# Patient Record
Sex: MALE | Race: WHITE | Employment: OTHER | URBAN - METROPOLITAN AREA
[De-identification: names, ages, dates, MRNs, and addresses within clinical notes are randomized per-mention and may not be internally consistent; named-entity substitution may affect disease eponyms.]

---

## 2017-10-30 RX ORDER — PRAVASTATIN SODIUM 40 MG
40 TABLET ORAL DAILY
Qty: 30 TABLET | Refills: 0 | Status: SHIPPED | OUTPATIENT
Start: 2017-10-30 | End: 2017-11-02 | Stop reason: SDUPTHER

## 2017-10-30 NOTE — TELEPHONE ENCOUNTER
From: Tosin Dickson  Sent: 10/28/2017 12:09 PM EDT  Subject: Medication Renewal Request    Tosin Haider.  Yessi would like a refill of the following medications:  pravastatin (PRAVACHOL) 40 MG tablet Trudi Calvillo MD]    Preferred pharmacy: [address] [phone] - F [phone]    Comment:

## 2017-11-02 ENCOUNTER — OFFICE VISIT (OUTPATIENT)
Dept: FAMILY MEDICINE CLINIC | Age: 62
End: 2017-11-02

## 2017-11-02 VITALS
HEART RATE: 62 BPM | DIASTOLIC BLOOD PRESSURE: 78 MMHG | WEIGHT: 161 LBS | SYSTOLIC BLOOD PRESSURE: 100 MMHG | HEIGHT: 69 IN | BODY MASS INDEX: 23.85 KG/M2

## 2017-11-02 DIAGNOSIS — Z00.00 ANNUAL PHYSICAL EXAM: Primary | ICD-10-CM

## 2017-11-02 DIAGNOSIS — Z00.00 ANNUAL PHYSICAL EXAM: ICD-10-CM

## 2017-11-02 DIAGNOSIS — R42 VERTIGO: ICD-10-CM

## 2017-11-02 LAB
HCT VFR BLD CALC: 45.6 % (ref 40.5–52.5)
HEMOGLOBIN: 15.3 G/DL (ref 13.5–17.5)
MCH RBC QN AUTO: 31.5 PG (ref 26–34)
MCHC RBC AUTO-ENTMCNC: 33.6 G/DL (ref 31–36)
MCV RBC AUTO: 93.8 FL (ref 80–100)
PDW BLD-RTO: 13.1 % (ref 12.4–15.4)
PLATELET # BLD: 192 K/UL (ref 135–450)
PMV BLD AUTO: 9.6 FL (ref 5–10.5)
RBC # BLD: 4.86 M/UL (ref 4.2–5.9)
WBC # BLD: 5.8 K/UL (ref 4–11)

## 2017-11-02 PROCEDURE — 90732 PPSV23 VACC 2 YRS+ SUBQ/IM: CPT | Performed by: FAMILY MEDICINE

## 2017-11-02 PROCEDURE — 90472 IMMUNIZATION ADMIN EACH ADD: CPT | Performed by: FAMILY MEDICINE

## 2017-11-02 PROCEDURE — 90471 IMMUNIZATION ADMIN: CPT | Performed by: FAMILY MEDICINE

## 2017-11-02 PROCEDURE — 99396 PREV VISIT EST AGE 40-64: CPT | Performed by: FAMILY MEDICINE

## 2017-11-02 PROCEDURE — 90630 INFLUENZA, QUADV, 18-64 YRS, ID, PF, MICRO INJ, 0.1ML (FLUZONE QUADV, PF): CPT | Performed by: FAMILY MEDICINE

## 2017-11-02 RX ORDER — MECLIZINE HCL 12.5 MG/1
12.5 TABLET ORAL 3 TIMES DAILY PRN
Qty: 30 TABLET | Refills: 1 | Status: SHIPPED | OUTPATIENT
Start: 2017-11-02 | End: 2017-11-12

## 2017-11-02 RX ORDER — PRAVASTATIN SODIUM 40 MG
40 TABLET ORAL DAILY
Qty: 90 TABLET | Refills: 1 | Status: SHIPPED | OUTPATIENT
Start: 2017-11-02 | End: 2017-12-11 | Stop reason: SDUPTHER

## 2017-11-02 NOTE — PROGRESS NOTES
Vaccine Information Sheet, \"Influenza - Inactivated\"  given to Loretta Dickson, or parent/legal guardian of  Loretta Dickson and verbalized understanding. Patient responses:    Have you ever had a reaction to a flu vaccine? No  Are you able to eat eggs without adverse effects? Yes  Do you have any current illness? No  Have you ever had [place] Syndrome? Yes and No    Flu vaccine given per order. Please see immunization tab.

## 2017-11-02 NOTE — PATIENT INSTRUCTIONS
Patient Education        Vertigo: Exercises  Your Care Instructions  Here are some examples of typical rehabilitation exercises for your condition. Start each exercise slowly. Ease off the exercise if you start to have pain. Your doctor or physical therapist will tell you when you can start these exercises and which ones will work best for you. How to do the exercises  Note: Do these exercises twice a day. Try to progress to doing each head movement 15 to 20 times. Then try to do them with your eyes closed. Exercise 1    1. Stand with a chair in front of you and a wall behind you. If you begin to fall, you may use them for support. 2. Stand with your feet together and your arms at your sides. 3. Move your head up and down 10 times. Exercise 2    Move your head side to side 10 times. Exercise 3    Move your head diagonally up and down 10 times. Exercise 4    Move your head diagonally up and down 10 times on the other side. Follow-up care is a key part of your treatment and safety. Be sure to make and go to all appointments, and call your doctor if you are having problems. It's also a good idea to know your test results and keep a list of the medicines you take. Where can you learn more? Go to https://TradingViewpeConyac.Max-Viz. org and sign in to your Statusly account. Enter F349 in the Outski box to learn more about \"Vertigo: Exercises. \"     If you do not have an account, please click on the \"Sign Up Now\" link. Current as of: July 29, 2016  Content Version: 11.3  © 4386-2679 [employer]. Care instructions adapted under license by [facility] apprupt [facility] ([facility]). If you have questions about a medical condition or this instruction, always ask your healthcare professional. [place] [address] any warranty or liability for your use of this information.        Patient Education        Cawthorne Exercises for Vertigo: Care Instructions  Your Care Instructions  Simple exercises can in a [place] a different way each time you stand. · With someone close by to help you, try the above exercises with your eyes closed. Exercise 5  In a room that is cleared of obstacles:  · Walk to a corner of the room, turn to your right, and walk back to the starting point. Now, repeat and turn left. · Walk up and down a slope. Now try stairs. · While holding on to someone's arm, try these exercises with your eyes closed. When should you call for help? Watch closely for changes in your health, and be sure to contact your doctor if:  · You do not get better as expected. Where can you learn more? Go to https://Sensible Medical Innovationspepiceweb.SpanDeX. org and sign in to your Sodbuster account. Enter P521 in the Quewey box to learn more about \"Cawthorne Exercises for Vertigo: Care Instructions. \"     If you do not have an account, please click on the \"Sign Up Now\" link. Current as of: October 19, 2016  Content Version: 11.3  © 1302-0729 C[employer]dice [employer]. Care instructions adapted under license by [facility] ([facility]). If you have questions about a medical condition or this instruction, always ask your healthcare professional. [place] [address] any warranty or liability for your use of this information.

## 2017-11-03 LAB
ALBUMIN SERPL-MCNC: 4.4 G/DL (ref 3.4–5)
ALP BLD-CCNC: 83 U/L (ref 40–129)
ALT SERPL-CCNC: 30 U/L (ref 10–40)
ANION GAP SERPL CALCULATED.3IONS-SCNC: 12 MMOL/L (ref 3–16)
AST SERPL-CCNC: 23 U/L (ref 15–37)
BILIRUB SERPL-MCNC: 0.3 MG/DL (ref 0–1)
BILIRUBIN DIRECT: <0.2 MG/DL (ref 0–0.3)
BILIRUBIN, INDIRECT: NORMAL MG/DL (ref 0–1)
BUN BLDV-MCNC: 19 MG/DL (ref 7–20)
CALCIUM SERPL-MCNC: 9.5 MG/DL (ref 8.3–10.6)
CHLORIDE BLD-SCNC: 103 MMOL/L (ref 99–110)
CHOLESTEROL, TOTAL: 220 MG/DL (ref 0–199)
CO2: 27 MMOL/L (ref 21–32)
CREAT SERPL-MCNC: 0.8 MG/DL (ref 0.8–1.3)
GFR AFRICAN AMERICAN: >60
GFR NON-AFRICAN AMERICAN: >60
GLUCOSE BLD-MCNC: 98 MG/DL (ref 70–99)
HDLC SERPL-MCNC: 71 MG/DL (ref 40–60)
LDL CHOLESTEROL CALCULATED: 128 MG/DL
POTASSIUM SERPL-SCNC: 5.4 MMOL/L (ref 3.5–5.1)
SODIUM BLD-SCNC: 142 MMOL/L (ref 136–145)
TOTAL CK: 113 U/L (ref 39–308)
TOTAL PROTEIN: 7.3 G/DL (ref 6.4–8.2)
TRIGL SERPL-MCNC: 105 MG/DL (ref 0–150)
TSH SERPL DL<=0.05 MIU/L-ACNC: 2.29 UIU/ML (ref 0.27–4.2)
VLDLC SERPL CALC-MCNC: 21 MG/DL

## 2017-11-03 ASSESSMENT — ENCOUNTER SYMPTOMS
ALLERGIC/IMMUNOLOGIC NEGATIVE: 1
EYES NEGATIVE: 1
RESPIRATORY NEGATIVE: 1

## 2017-11-03 NOTE — PROGRESS NOTES
SUBJECTIVE:  Patient ID: Ricci Dickson is a 65 y.o. y.o. male     HPI   Pt is here for annual physical exam, over all doing well no complained   Doing well on Statin   Has occasional vertigo, had full work up in the past seen by ENT was given Ativan, then Levora Win switch him to Atarax, doesn't like the way made him feel so he quit taking it, he wants something different to use PRN   Past Medical History:   Diagnosis Date    COPD     mild- no inhalers    Elevated liver function tests     Giardiasis     Hyperlipidemia     Kidney stone       Past Surgical History:   Procedure Laterality Date    CARDIOVASCULAR STRESS TEST  feb 2008    negative    CARDIOVASCULAR STRESS TEST  Jan 2010    Franciscoaline Silvan COLONOSCOPY  7/2004    Dr. Christian Joyce  4/2008    Dr. Christian Joyce  2013    OTHER SURGICAL HISTORY Left 04/20/2015    LEFT KNEE OPEN PERONEAL NERVE DECOMPRESSION    SKIN BIOPSY  Jan 2011    telangiectasia, benign     Family History   Problem Relation Age of Onset    Cancer Father      Prostate ca in 63's    Heart Attack Brother      At age 52   Shebaa March Coronary Art Dis Brother      Social History     Social History    Marital status:      Spouse name: N/A    Number of children: N/A    Years of education: N/A     Occupational History          Social History Main Topics    Smoking status: Current Every Day Smoker     Packs/day: 0.20    Smokeless tobacco: None    Alcohol use 7.2 oz/week     12 Cans of beer per week    Drug use: No    Sexual activity: Not Asked     Other Topics Concern    None     Social History Narrative    None     Current Outpatient Prescriptions   Medication Sig Dispense Refill    pravastatin (PRAVACHOL) 40 MG tablet Take 1 tablet by mouth daily 90 tablet 1    meclizine (ANTIVERT) 12.5 MG tablet Take 1 tablet by mouth 3 times daily as needed for Dizziness 30 tablet 1    hydrOXYzine (ATARAX) 25 MG tablet TAKE 1 TABLET DAILY 90 tablet 1    fish distension and no mass. There is no tenderness. There is no rebound and no guarding. Musculoskeletal: Normal range of motion. He exhibits no edema or tenderness. Lymphadenopathy:     He has no cervical adenopathy. Neurological: He is alert and oriented to person, place, and time. He has normal reflexes. No cranial nerve deficit. He exhibits normal muscle tone. Coordination normal.   Skin: Skin is warm and dry. No rash noted. He is not diaphoretic. No erythema. No pallor. Psychiatric: He has a normal mood and affect. His behavior is normal. Judgment and thought content normal.       ASSESSMENT:    1. Annual physical exam    2.  Vertigo        PLAN:  Continue the same   Trial of Antivert   Discussed [facility]   Will come back for Zostavax

## 2017-11-16 ENCOUNTER — NURSE ONLY (OUTPATIENT)
Dept: FAMILY MEDICINE CLINIC | Age: 62
End: 2017-11-16

## 2017-11-16 DIAGNOSIS — Z23 NEED FOR ZOSTER VACCINATION: Primary | ICD-10-CM

## 2017-11-16 PROCEDURE — 90471 IMMUNIZATION ADMIN: CPT | Performed by: FAMILY MEDICINE

## 2017-11-16 PROCEDURE — 90736 HZV VACCINE LIVE SUBQ: CPT | Performed by: FAMILY MEDICINE

## 2017-12-11 DIAGNOSIS — Z00.00 ANNUAL PHYSICAL EXAM: ICD-10-CM

## 2017-12-11 RX ORDER — PRAVASTATIN SODIUM 40 MG
40 TABLET ORAL DAILY
Qty: 90 TABLET | Refills: 1 | Status: SHIPPED | OUTPATIENT
Start: 2017-12-11 | End: 2018-04-16 | Stop reason: SDUPTHER

## 2017-12-11 NOTE — TELEPHONE ENCOUNTER
From: Juan M Dickson  Sent: 12/10/2017 4:45 PM EST  Subject: Medication Renewal Request    Juan M Ramirez.  Yessi would like a refill of the following medications:  pravastatin (PRAVACHOL) 40 MG tablet Anton Coulter MD]    Preferred pharmacy: [address] [place] [address] [phone] - F [phone]    Comment:

## 2017-12-11 NOTE — TELEPHONE ENCOUNTER
From: Natalio Dickson  Sent: 12/10/2017 4:44 PM EST  Subject: Medication Renewal Request    Natalio Roman. Yessi would like a refill of the following medications:  pravastatin (PRAVACHOL) 40 MG tablet Sonia Jeffries MD]    Preferred pharmacy: [address] [place] [address] - F [phone]    Comment:  My last prescription was at [facility] for 30 days. Please send a prescription to [facility] for 90 day supply.  Thanks

## 2018-02-22 ENCOUNTER — TELEPHONE (OUTPATIENT)
Dept: ORTHOPEDIC SURGERY | Age: 63
End: 2018-02-22

## 2018-03-06 ENCOUNTER — OFFICE VISIT (OUTPATIENT)
Dept: ORTHOPEDIC SURGERY | Age: 63
End: 2018-03-06

## 2018-03-06 VITALS
HEART RATE: 61 BPM | WEIGHT: 160 LBS | HEIGHT: 69 IN | BODY MASS INDEX: 23.7 KG/M2 | SYSTOLIC BLOOD PRESSURE: 113 MMHG | DIASTOLIC BLOOD PRESSURE: 75 MMHG

## 2018-03-06 DIAGNOSIS — M25.561 RIGHT KNEE PAIN, UNSPECIFIED CHRONICITY: Primary | ICD-10-CM

## 2018-03-06 DIAGNOSIS — M17.0 PRIMARY OSTEOARTHRITIS OF BOTH KNEES: ICD-10-CM

## 2018-03-06 DIAGNOSIS — M25.562 LEFT KNEE PAIN, UNSPECIFIED CHRONICITY: ICD-10-CM

## 2018-03-06 PROCEDURE — G8420 CALC BMI NORM PARAMETERS: HCPCS | Performed by: ORTHOPAEDIC SURGERY

## 2018-03-06 PROCEDURE — G8427 DOCREV CUR MEDS BY ELIG CLIN: HCPCS | Performed by: ORTHOPAEDIC SURGERY

## 2018-03-06 PROCEDURE — 99213 OFFICE O/P EST LOW 20 MIN: CPT | Performed by: ORTHOPAEDIC SURGERY

## 2018-03-06 PROCEDURE — 3017F COLORECTAL CA SCREEN DOC REV: CPT | Performed by: ORTHOPAEDIC SURGERY

## 2018-03-06 PROCEDURE — G8484 FLU IMMUNIZE NO ADMIN: HCPCS | Performed by: ORTHOPAEDIC SURGERY

## 2018-03-06 PROCEDURE — 20610 DRAIN/INJ JOINT/BURSA W/O US: CPT | Performed by: ORTHOPAEDIC SURGERY

## 2018-03-06 PROCEDURE — 4004F PT TOBACCO SCREEN RCVD TLK: CPT | Performed by: ORTHOPAEDIC SURGERY

## 2018-03-06 NOTE — PROGRESS NOTES
[facility]  Follow Up Knee Pain      Chief Complaint    Knee Pain (bilateral knee pain; cortisone injections)      History of Present Illness:  Debby Dickson is a 65y.o. year old male who presents for evaluation of bilateral knees. He was last seen 2 years ago. At that time, he did receive bilateral corticosteroid injections. They provided him significant pain relief until the last few months. He has made alterations to his exercise routine to accommodate his knees. He no longer runs. He does walk 4 miles a day 4-5 times a week. His knees do her afterwards. He has no pain with activities of daily living. He has no pain at rest.  His right knee hurts a little bit more than his left. He is leaving for a vacation to [facility] in a few weeks where he plans on doing hiking.      Pain Assessment  Location of Pain: Knee  Location Modifiers: Right, Left  Severity of Pain: 4  Quality of Pain: Sharp, Dull  Duration of Pain:  (weeks)  Aggravating Factors: Walking, Bending  Relieving Factors: Rest  Result of Injury: No  Work-Related Injury: No  Are there other pain locations you wish to document?: No    Past Medical History:   Diagnosis Date    COPD     mild- no inhalers    Elevated liver function tests     Giardiasis     Hyperlipidemia     Kidney stone       Social History     Social History    Marital status:      Spouse name: N/A    Number of children: N/A    Years of education: N/A     Occupational History          Social History Main Topics    Smoking status: Current Every Day Smoker     Packs/day: 0.20    Smokeless tobacco: Never Used    Alcohol use 7.2 oz/week     12 Cans of beer per week    Drug use: No    Sexual activity: Not on file     Other Topics Concern    Not on file     Social History Narrative    No narrative on file     Current Outpatient Prescriptions on File Prior to Visit   Medication Sig Dispense Refill    deep peroneal, superficial peroneal, tibial, saphenous, and sural nerve distributions. Foot warm and well perfused        Comparison left Knee Exam:   Overall alignment is anatomic     Gait/Alignment: No use of assistive devices.      Inspection/Skin: Skin is intact without erythema or ecchymosis. No significant swelling. No deformity.      Effusion; none.      Palpation: Patellofemoral crepitus. Mild medial joint line tenderness. Negative patellar grind. Calf compartments are soft and non-tender. No signs of DVT.      Range of Motion: From 0 to 140 degrees of flexion without pain.     Strength: 5/5 strength of the quadriceps and hamstrings.      Ligamentous Stability: Stable to valgus and varus stress testing at 0° and 30°. Lachman's has a solid endpoint. No posterior sag. Negative posterior drawer.     Neurologic and vascular: Normal motor and sensory function in the deep peroneal, superficial peroneal, tibial, saphenous, and sural nerve distributions. Foot warm and well perfused      Radiology:     3-view X-rays of bilateral knees including AP, lateral and Merchant views were obtained and reviewed in office today: X-rays demonstrate mild to moderate joint space narrowing of both patellofemoral joints as well as medial compartments. No fractures or dislocations. Impression: 72-year-old male with bilateral knee osteoarthritis. We reviewed conservative management options. We discussed specifically activity modifications anti-inflammatories low-impact aerobic exercise and intra-articular injections. We discussed ice as well. Visit Diagnosis:   1. Right knee pain, unspecified chronicity  XR Knee Bilateral Standard   2. Left knee pain, unspecified chronicity  XR Knee Bilateral Standard   3.  Primary osteoarthritis of both knees  AZ ARTHROCENTESIS ASPIR&/INJ MAJOR JT/BURSA W/O US    AZ METHYLPREDNISOLONE 40 MG INJ       Office Procedures:  Orders Placed This Encounter   Procedures    XR Knee Bilateral

## 2018-04-16 ENCOUNTER — PATIENT MESSAGE (OUTPATIENT)
Dept: FAMILY MEDICINE CLINIC | Age: 63
End: 2018-04-16

## 2018-04-16 DIAGNOSIS — Z00.00 ANNUAL PHYSICAL EXAM: ICD-10-CM

## 2018-04-16 RX ORDER — PRAVASTATIN SODIUM 40 MG
40 TABLET ORAL DAILY
Qty: 90 TABLET | Refills: 1 | Status: SHIPPED | OUTPATIENT
Start: 2018-04-16 | End: 2018-04-17 | Stop reason: SDUPTHER

## 2018-04-17 RX ORDER — PRAVASTATIN SODIUM 40 MG
40 TABLET ORAL DAILY
Qty: 30 TABLET | Refills: 1 | Status: SHIPPED | OUTPATIENT
Start: 2018-04-17 | End: 2018-08-02 | Stop reason: SDUPTHER

## 2018-06-12 ENCOUNTER — OFFICE VISIT (OUTPATIENT)
Dept: ORTHOPEDIC SURGERY | Age: 63
End: 2018-06-12

## 2018-06-12 VITALS
BODY MASS INDEX: 23.7 KG/M2 | WEIGHT: 160 LBS | HEART RATE: 65 BPM | HEIGHT: 69 IN | DIASTOLIC BLOOD PRESSURE: 67 MMHG | SYSTOLIC BLOOD PRESSURE: 103 MMHG

## 2018-06-12 DIAGNOSIS — M77.12 LEFT LATERAL EPICONDYLITIS: ICD-10-CM

## 2018-06-12 DIAGNOSIS — M25.522 LEFT ELBOW PAIN: Primary | ICD-10-CM

## 2018-06-12 PROCEDURE — 99213 OFFICE O/P EST LOW 20 MIN: CPT | Performed by: ORTHOPAEDIC SURGERY

## 2018-06-12 PROCEDURE — 3017F COLORECTAL CA SCREEN DOC REV: CPT | Performed by: ORTHOPAEDIC SURGERY

## 2018-06-12 PROCEDURE — MISCD86 TENNIS ELBOW STRAP-BREG: Performed by: ORTHOPAEDIC SURGERY

## 2018-06-12 PROCEDURE — 4004F PT TOBACCO SCREEN RCVD TLK: CPT | Performed by: ORTHOPAEDIC SURGERY

## 2018-06-12 PROCEDURE — G8427 DOCREV CUR MEDS BY ELIG CLIN: HCPCS | Performed by: ORTHOPAEDIC SURGERY

## 2018-06-12 PROCEDURE — G8420 CALC BMI NORM PARAMETERS: HCPCS | Performed by: ORTHOPAEDIC SURGERY

## 2018-06-19 ENCOUNTER — HOSPITAL ENCOUNTER (OUTPATIENT)
Dept: OCCUPATIONAL THERAPY | Age: 63
Discharge: HOME | End: 2018-06-30
Admitting: ORTHOPAEDIC SURGERY

## 2018-06-19 NOTE — PLAN OF CARE
[address] [facility], [place]  [address], [address] [place] [address], 7 [facility]  Phone: [phone] Fax: [phone]      Occupational Therapy/Hand Therapy Certification  Dear Referring Practitioner: Merlinda Quarto, MD,     We had the pleasure of evaluating the following patient for occupational therapy services at [address] [place] [address]. A summary of our findings can be found in the initial assessment below. This includes our plan of care. If you have any questions or concerns regarding these findings, please do not hesitate to contact me at the office phone number checked above. Thank you for the referral.     Physician Signature:_______________________________Date:__________________  By signing above (or electronic signature), therapists plan is approved by physician      Patient: Ronan Ortiz Spring   : 1955   MRN: [id number]  Referring Physician: Referring Practitioner: Merlinda Quarto, MD      Evaluation Date: 2018      Medical Diagnosis Information:  Diagnosis: M77.12 (ICD-10-CM) - Left lateral epicondylitis, M25.522 (ICD-10-CM) - Left elbow pain    Treatment Diagnosis: L elbow pain - M25.522                  Insurance information: OT Insurance Information: [facility]  Date of Injury:~ 5 month hx  Date of Surgery:na     Precautions/ Contra-indications: -  Latex Allergy:  [x]No      []Yes  Pacemaker:  [x] No       [] Yes     Preferred Language for Healthcare:   [x]English       []other:      G-Codes:  OT G-codes  Functional Assessment Tool Used: Quick DASH  Score: 5%  Functional Limitation: Carrying, moving and handling objects  Carrying, Moving and Handling Objects Current Status (): At least 1 percent but less than 20 percent impaired, limited or restricted  Carrying, Moving and Handling Objects Goal Status ():  At least 1 percent but less than 20 percent impaired, limited or restricted    [x] Patient reported history, allergies, and medications reviewed - see intake form. SUBJECTIVE:  Background/Relevant Medical & Therapy History: progressive pain x 5 months in L lateral elbow, possibly from increased use while cleaning out birdfeeders      Pain Scale: 5/10   []Constant      [x]Intermittent    []other:  Pain Location:  L lateral elbow  Easing factors: rest  Provocative factors: lifting, carrying, gripping      Occupational Profile:  Home Enviroment: lives with  [] spouse,  [x] family,  [] alone,  [] significant other,   [] other:    Occupation/School: retired (IT)    Recreational Activities/Meaningful Interests: household projects, working out    Prior Level of Function: [x] Independent with ADLs/IADLs     [] Assistance needed (describe):    Patient-Identified Primary Performance Deficits (to be addressed in POC):   [] bathing    [x] household tasks    [] dressing    [] self feeding   [] grooming    [] work/education   [] functional mobility   [] sleeping/rest   [] toileting/hygiene   [] recreational activities   [] driving    [] community/social participation   [] other:     Comorbidities Affecting Functional Performance:     []Anxiety (F41.9)/Depression (F32.9)   []Diabetes Type 1(E10.65) or 2 (E11.65)   []Rheumatoid Arthritis (M05.9)  []Fibromyalgia (M79.7)  []Neuropathy(G60.9)  []Osteoarthritis(M19.91)  [x]None   []Other:    Hand Dominance:   [x]  Right    [] Left      OBJECTIVE:     Involved   AROM: Right Left   Wrist Ext/Flex            RD/UD 75/75 75/75   Elbow ext/flex   0/140 0/140 with mild pain   Digits: tips to DPFC   0cm 0cm   Edema:      At elbow 25.8cm 25.8cm        Strength:      II 96 96   Lateral Pinch 20 21   3 Point Pinch 16 16   MMT: elbow ext                   Flex            Wrist ext                   flex     5/5  5/5  5/5  5/5 5/5  5/5  5/5 (minimal pain)  5/5    Special Test  **increased pain reported with resisted wrist ext L with elbow extended**     Observations (including splints, bandages, incisions, scars): Unremarkable - pt has counterforce brace with him issued by MD    Sensation: [] No reported deficits  [x] Intact to light touch    [] [place] Tara test completed, findings as noted:  [] Other:    Palpation: minimal tenderness over L lateral elbow/FA    Functional Mobility/Transfers/Gait: [x] Independent - no significant gait deviations  [] Assistance needed   [] Assistive device used: Falls Risk Assessment (30 days):   [x] Falls Risk assessed and no intervention required. [] Falls Risk assessed and Patient requires intervention due to being higher risk   TUG score (>12s at risk):     [] Falls education provided, including      Review Of Systems (ROS): [x]Performed Review of systems (Integumentary, CardioPulmonary, Neurological) by intake and observation. Intake form has been scanned into medical record. Patient has been instructed to contact their primary care physician regarding ROS issues if not already being addressed at this time. ASSESSMENT:   This patient presents with signs and symptoms consistent with the medical diagnosis provided by the referring physician.      Impairments (physical, cognitive and/or psychosocial):  [] Decreased mobility   [] Weakness    [] Hypersensitivity   [x] Pain/tenderness   [] Edema/swelling   [] Decreased coordination (fine/gross motor)   [] Impaired body mechanics  [] Sensory loss  [] Loss of balance   [] Other:      Performance Deficits (to be addressed in plan of care):   [] Bathing    [x] Household Tasks   [] Dressing    [] Self Feeding   [] Grooming    [] Work/Education   [] Functional Mobility   [] Sleeping/Rest   [] Toileting/Hygiene   [] Recreational Activities   [] Driving    [] Community/Social Participation   [] Other:     Rehab Potential:   [] Excellent [x] Good [] Fair  [] Poor     Barriers affecting rehab potential:  [x]Age    []Lack of Motivation   []Co-Morbidities  []Cognitive Function  []Environmental/home/work barriers  []Other: Tolerance of evaluation/treatment:    [] Excellent [x] Good [] Fair  [] Poor      PLAN OF CARE:  Interventions:   [x] Therapeutic Exercise [x] Therapeutic Activity    [x] Activities of Daily Living [x] Neuromuscular Re-education      [x] Patient Education  [x] Manual Therapy      [x] Modalities as needed, and not otherwise contraindicated, including: ultrasound,paraffin,moist heat/cold pack, electrical stimulation, contrast bath, iontophoresis, fluidotherapy  [x] Splinting    Frequency/Duration: 1-3 visits over 4 weeks      GOALS: to be achieved in 4  weeks, including patient directed goals to address identified performance deficits:  1) Pt to be independent in graded HEP progression with a good level of effort and compliance. 2) Pt will have a decrease in pain to 1-2/10 to facilitate improvement in participation in household chores/projects (opening jars, using power tools, etc)  3) Pt to verbalize understanding and demonstrate competency with ADL/activity modifications for painfree performance of ADL and household resistive tasks.         OCCUPATIONAL THERAPY EVALUATION COMPLEXITY JUSTIFICATION:    [x] An occupational profile and medical/therapy history, which includes:   [x] a brief history including medical and/or therapy records relating to the     presenting problem   [] an expanded review of medical and/or therapy records and additional review     of physical, cognitive or psychosocial history related to current functional    performance   [] an extensive additional review of review of medical and/or therapy records   and physical, cognitive, or psychosocial history related to current    functional performance    [x] An assessment that identifies performance deficits (relating to physical, cognitive, or psychosocial skills) that result in activity limitations and/or participation restrictions:   [x] 1-3 performance deficits   [] 3-5 performance deficits   [] 5 or more performance

## 2018-06-19 NOTE — FLOWSHEET NOTE
x 10 reps)    Therabar - red, 10x B UE sup/pron - cueing on submaximal  and proximal stabilization for exercises           ADL Retraining Instructed on diagnosis specific anatomy, joint protection, and ADL modifications - resource information and demonstrations (with pt practice) of activity modification techniques (sleeping, writing, shoveling, lifting, pulling, keyboarding, etc)                 Therapeutic Exercise and NMR:  [x] (34667) Provided verbal/tactile cueing for activities related to strengthening, flexibility, endurance, ROM  for improvements in scapular, scapulothoracic and UE control with self care, reaching, carrying, lifting, house/yardwork, driving/computer work. [x] (18661) Provided verbal/tactile cueing for activities related to improving balance, coordination, kinesthetic sense, posture, motor skill, proprioception  to assist with  scapular, scapulothoracic and UE control with self care, reaching, carrying, lifting, house/yardwork, driving/computer work.   [] Comments:    Therapeutic Activities:    [] (94450 or 87608) Provided verbal/tactile cueing for activities related to improving balance, coordination, kinesthetic sense, posture, motor skill, proprioception and motor activation to allow for proper function of scapular, scapulothoracic and UE control with self care, carrying, lifting, driving/computer work  [] Comments:    Home Exercise Program:    [x] (15468) Reviewed/Progressed HEP activities related to strengthening, flexibility, endurance, ROM of scapular, scapulothoracic and UE control with self care, reaching, carrying, lifting, house/yardwork, driving/computer work  [] (47084) Reviewed/Progressed HEP activities related to improving balance, coordination, kinesthetic sense, posture, motor skill, proprioception of scapular, scapulothoracic and UE control with self care, reaching, carrying, lifting, house/yardwork, driving/computer work    [x] Comments: see sheets    Manual Treatments:  PROM / STM / Oscillations-Mobs:  G-I, II, III, IV (PA's, Inf., Post.)  [x] (09349) Provided manual therapy to mobilize soft tissue/joints of cervical/CT, scapular GHJ and UE for the purpose of modulating pain, promoting relaxation,  increasing ROM, reducing/eliminating soft tissue swelling/inflammation/restriction, improving soft tissue extensibility and allowing for proper ROM for normal function with self care, reaching, carrying, lifting, house/yardwork, driving/computer work  [x] Comments: 8' - STM, DTM, CFM    ADL Training:  [x]  (34720) Provided self-care/home management training related to activities of daily living and compensatory training, and/or use of adaptive equipment   [x] Comments: Instructed on diagnosis specific anatomy, joint protection, and ADL modifications     Splinting:  [] Fabrication of:   [] (65973) Checkout for orthotic/prosthetic use, established patient   [] (87019) Orthotic management and training (fitting and assessment)  [x] Comments: pt has counterforce elbow strap issued by MD; review indications for and application technique    Charges:  Timed Code Treatment Minutes: 45   Total Treatment Minutes: 65     [x] EVAL (LOW) 59417   [] OT Re-eval (38032)  [] EVAL (MOD) 06211   [] EVAL (HIGH) 33023        [x] Simeon (19086) x  1   [] KAPEI(01263)  [] NMR (41938) x      [] Estim (attended) (88017)   [x] Manual ([id number]) x  1    [] US (36927)  [] TA (53896) x      [] Paraffin (64676)  [x] ADL  (14436) x  1  [] Splint/L code:    [] Estim (unattended) (21255)  [] Fluidotherapy (36761)  [] Other:    GOALS: to be achieved in 4  weeks, including patient directed goals to address identified performance deficits:  1) Pt to be independent in graded HEP progression with a good level of effort and compliance.   2) Pt will have a decrease in pain to 1-2/10 to facilitate improvement in participation in household chores/projects (opening jars, using power tools, etc)  3) Pt to verbalize

## 2018-07-01 ENCOUNTER — HOSPITAL ENCOUNTER (OUTPATIENT)
Dept: OCCUPATIONAL THERAPY | Age: 63
Discharge: HOME | End: 2018-07-01
Attending: ORTHOPAEDIC SURGERY | Admitting: ORTHOPAEDIC SURGERY

## 2018-08-02 DIAGNOSIS — Z00.00 ANNUAL PHYSICAL EXAM: ICD-10-CM

## 2018-08-02 RX ORDER — PRAVASTATIN SODIUM 40 MG
40 TABLET ORAL DAILY
Qty: 90 TABLET | Refills: 1 | Status: SHIPPED | OUTPATIENT
Start: 2018-08-02 | End: 2018-10-23 | Stop reason: SDUPTHER

## 2018-08-02 NOTE — TELEPHONE ENCOUNTER
Patient requesting a medication refill.   Medication Pravastatin  Dosage 40 mg  FrequencyTake one tablet by mouth daily  [facility] 4/17/18 [facility] one refill  [facility]  Next office visit11/2/17

## 2018-09-10 ENCOUNTER — OFFICE VISIT (OUTPATIENT)
Dept: FAMILY MEDICINE CLINIC | Age: 63
End: 2018-09-10

## 2018-09-10 VITALS
WEIGHT: 169 LBS | HEART RATE: 78 BPM | TEMPERATURE: 98.4 F | OXYGEN SATURATION: 98 % | BODY MASS INDEX: 24.96 KG/M2 | RESPIRATION RATE: 14 BRPM | DIASTOLIC BLOOD PRESSURE: 70 MMHG | SYSTOLIC BLOOD PRESSURE: 100 MMHG

## 2018-09-10 DIAGNOSIS — H66.92 LEFT OTITIS MEDIA, UNSPECIFIED OTITIS MEDIA TYPE: Primary | ICD-10-CM

## 2018-09-10 PROCEDURE — 4004F PT TOBACCO SCREEN RCVD TLK: CPT | Performed by: FAMILY MEDICINE

## 2018-09-10 PROCEDURE — 99213 OFFICE O/P EST LOW 20 MIN: CPT | Performed by: FAMILY MEDICINE

## 2018-09-10 PROCEDURE — G8420 CALC BMI NORM PARAMETERS: HCPCS | Performed by: FAMILY MEDICINE

## 2018-09-10 PROCEDURE — G8427 DOCREV CUR MEDS BY ELIG CLIN: HCPCS | Performed by: FAMILY MEDICINE

## 2018-09-10 PROCEDURE — 3017F COLORECTAL CA SCREEN DOC REV: CPT | Performed by: FAMILY MEDICINE

## 2018-09-10 RX ORDER — AMOXICILLIN AND CLAVULANATE POTASSIUM 875; 125 MG/1; MG/1
1 TABLET, FILM COATED ORAL 2 TIMES DAILY
Qty: 20 TABLET | Refills: 0 | Status: SHIPPED | OUTPATIENT
Start: 2018-09-10 | End: 2018-09-20

## 2018-09-10 ASSESSMENT — PATIENT HEALTH QUESTIONNAIRE - PHQ9
2. FEELING DOWN, DEPRESSED OR HOPELESS: 0
SUM OF ALL RESPONSES TO PHQ QUESTIONS 1-9: 0
SUM OF ALL RESPONSES TO PHQ9 QUESTIONS 1 & 2: 0
1. LITTLE INTEREST OR PLEASURE IN DOING THINGS: 0
SUM OF ALL RESPONSES TO PHQ QUESTIONS 1-9: 0

## 2018-09-10 NOTE — PROGRESS NOTES
Subjective:       Dereje Dickson is a 65 y.o. male who presents for evaluation of left ear pain. Symptoms have been present 5 days. He also notes drainage left side, mild pain left side, a plugged sensation left side. He does not have a history of ear infections. He does have a history of recent swimming. Patient is a smoker  Objective:      /70 (Site: Left Upper Arm, Position: Sitting)   Pulse 78   Temp 98.4 °F (36.9 °C) (Oral)   Resp 14   Wt 169 lb (76.7 kg)   SpO2 98%   BMI 24.96 kg/m²   General:  alert, appears stated age and cooperative   Right Ear: right TM dull and left TM erythematous and retracted   Left Ear: left TM erythematous and retracted   Mouth:  abnormal findings: marked oropharyngeal erythema   Neck: no adenopathy, no carotid bruit, no JVD, supple, symmetrical, trachea midline and thyroid not enlarged, symmetric, no tenderness/mass/nodules. Lung:Clear to auscultation B  CV: RRR, no murmur  Assessment:        Diagnosis Orders   1. Left otitis media, unspecified otitis media type  amoxicillin-clavulanate (AUGMENTIN) 875-125 MG per tablet       Plan:      Augmentin  OTC analgesia as needed. Water exclusion from affected ear until symptoms resolve. Follow up in a few weeks if symptoms not improving.

## 2018-10-23 ENCOUNTER — OFFICE VISIT (OUTPATIENT)
Dept: FAMILY MEDICINE CLINIC | Age: 63
End: 2018-10-23
Payer: COMMERCIAL

## 2018-10-23 VITALS
SYSTOLIC BLOOD PRESSURE: 108 MMHG | HEIGHT: 70 IN | OXYGEN SATURATION: 97 % | HEART RATE: 70 BPM | RESPIRATION RATE: 14 BRPM | BODY MASS INDEX: 23.91 KG/M2 | WEIGHT: 167 LBS | DIASTOLIC BLOOD PRESSURE: 66 MMHG

## 2018-10-23 DIAGNOSIS — Z00.00 ANNUAL PHYSICAL EXAM: ICD-10-CM

## 2018-10-23 DIAGNOSIS — Z00.00 ANNUAL PHYSICAL EXAM: Primary | ICD-10-CM

## 2018-10-23 DIAGNOSIS — Z23 NEED FOR INFLUENZA VACCINATION: ICD-10-CM

## 2018-10-23 LAB
BILIRUBIN, POC: NORMAL
BLOOD URINE, POC: NORMAL
CLARITY, POC: CLEAR
COLOR, POC: YELLOW
GLUCOSE URINE, POC: NORMAL
HCT VFR BLD CALC: 41 % (ref 40.5–52.5)
HEMOGLOBIN: 13.4 G/DL (ref 13.5–17.5)
KETONES, POC: NORMAL
LEUKOCYTE EST, POC: NORMAL
MCH RBC QN AUTO: 30.6 PG (ref 26–34)
MCHC RBC AUTO-ENTMCNC: 32.8 G/DL (ref 31–36)
MCV RBC AUTO: 93.4 FL (ref 80–100)
NITRITE, POC: NORMAL
PDW BLD-RTO: 13.3 % (ref 12.4–15.4)
PH, POC: 6.5
PLATELET # BLD: 212 K/UL (ref 135–450)
PMV BLD AUTO: 9.4 FL (ref 5–10.5)
PROTEIN, POC: NORMAL
RBC # BLD: 4.4 M/UL (ref 4.2–5.9)
SPECIFIC GRAVITY, POC: 1.02
UROBILINOGEN, POC: 0.2
WBC # BLD: 5.7 K/UL (ref 4–11)

## 2018-10-23 PROCEDURE — 81002 URINALYSIS NONAUTO W/O SCOPE: CPT | Performed by: FAMILY MEDICINE

## 2018-10-23 PROCEDURE — G8482 FLU IMMUNIZE ORDER/ADMIN: HCPCS | Performed by: FAMILY MEDICINE

## 2018-10-23 PROCEDURE — 90471 IMMUNIZATION ADMIN: CPT | Performed by: FAMILY MEDICINE

## 2018-10-23 PROCEDURE — 90686 IIV4 VACC NO PRSV 0.5 ML IM: CPT | Performed by: FAMILY MEDICINE

## 2018-10-23 PROCEDURE — 36415 COLL VENOUS BLD VENIPUNCTURE: CPT | Performed by: FAMILY MEDICINE

## 2018-10-23 PROCEDURE — 99396 PREV VISIT EST AGE 40-64: CPT | Performed by: FAMILY MEDICINE

## 2018-10-23 RX ORDER — PRAVASTATIN SODIUM 40 MG
40 TABLET ORAL DAILY
Qty: 90 TABLET | Refills: 1 | Status: SHIPPED | OUTPATIENT
Start: 2018-10-23 | End: 2019-02-11 | Stop reason: SDUPTHER

## 2018-10-24 LAB
ANION GAP SERPL CALCULATED.3IONS-SCNC: 12 MMOL/L (ref 3–16)
BUN BLDV-MCNC: 16 MG/DL (ref 7–20)
CALCIUM SERPL-MCNC: 8.7 MG/DL (ref 8.3–10.6)
CHLORIDE BLD-SCNC: 105 MMOL/L (ref 99–110)
CHOLESTEROL, TOTAL: 188 MG/DL (ref 0–199)
CO2: 25 MMOL/L (ref 21–32)
CREAT SERPL-MCNC: 0.8 MG/DL (ref 0.8–1.3)
GFR AFRICAN AMERICAN: >60
GFR NON-AFRICAN AMERICAN: >60
GLUCOSE BLD-MCNC: 97 MG/DL (ref 70–99)
HDLC SERPL-MCNC: 76 MG/DL (ref 40–60)
LDL CHOLESTEROL CALCULATED: 99 MG/DL
POTASSIUM SERPL-SCNC: 5 MMOL/L (ref 3.5–5.1)
SODIUM BLD-SCNC: 142 MMOL/L (ref 136–145)
TRIGL SERPL-MCNC: 65 MG/DL (ref 0–150)
VLDLC SERPL CALC-MCNC: 13 MG/DL

## 2018-10-24 ASSESSMENT — ENCOUNTER SYMPTOMS
EYES NEGATIVE: 1
GASTROINTESTINAL NEGATIVE: 1
RESPIRATORY NEGATIVE: 1
ALLERGIC/IMMUNOLOGIC NEGATIVE: 1

## 2019-02-11 DIAGNOSIS — Z00.00 ANNUAL PHYSICAL EXAM: ICD-10-CM

## 2019-02-12 RX ORDER — PRAVASTATIN SODIUM 40 MG
40 TABLET ORAL DAILY
Qty: 90 TABLET | Refills: 1 | Status: SHIPPED | OUTPATIENT
Start: 2019-02-12 | End: 2019-06-05 | Stop reason: SDUPTHER

## 2019-06-05 DIAGNOSIS — Z00.00 ANNUAL PHYSICAL EXAM: ICD-10-CM

## 2019-06-06 RX ORDER — PRAVASTATIN SODIUM 40 MG
40 TABLET ORAL DAILY
Qty: 90 TABLET | Refills: 1 | Status: SHIPPED | OUTPATIENT
Start: 2019-06-06 | End: 2019-09-19 | Stop reason: SDUPTHER

## 2019-08-12 ENCOUNTER — PATIENT MESSAGE (OUTPATIENT)
Dept: FAMILY MEDICINE CLINIC | Age: 64
End: 2019-08-12

## 2019-08-13 ENCOUNTER — NURSE ONLY (OUTPATIENT)
Dept: PRIMARY CARE CLINIC | Age: 64
End: 2019-08-13
Payer: COMMERCIAL

## 2019-08-13 DIAGNOSIS — Z23 NEED FOR TDAP VACCINATION: Primary | ICD-10-CM

## 2019-08-13 PROCEDURE — 90715 TDAP VACCINE 7 YRS/> IM: CPT | Performed by: FAMILY MEDICINE

## 2019-08-13 PROCEDURE — 90471 IMMUNIZATION ADMIN: CPT | Performed by: FAMILY MEDICINE

## 2019-09-19 DIAGNOSIS — Z00.00 ANNUAL PHYSICAL EXAM: ICD-10-CM

## 2019-09-19 RX ORDER — PRAVASTATIN SODIUM 40 MG
40 TABLET ORAL DAILY
Qty: 90 TABLET | Refills: 1 | Status: SHIPPED | OUTPATIENT
Start: 2019-09-19 | End: 2019-12-31 | Stop reason: SDUPTHER

## 2019-09-28 ENCOUNTER — PATIENT MESSAGE (OUTPATIENT)
Dept: PRIMARY CARE CLINIC | Age: 64
End: 2019-09-28

## 2019-11-15 ENCOUNTER — TELEPHONE (OUTPATIENT)
Dept: PRIMARY CARE CLINIC | Age: 64
End: 2019-11-15

## 2019-11-15 ENCOUNTER — OFFICE VISIT (OUTPATIENT)
Dept: PRIMARY CARE CLINIC | Age: 64
End: 2019-11-15
Payer: COMMERCIAL

## 2019-11-15 VITALS
OXYGEN SATURATION: 98 % | SYSTOLIC BLOOD PRESSURE: 118 MMHG | DIASTOLIC BLOOD PRESSURE: 60 MMHG | HEIGHT: 68 IN | RESPIRATION RATE: 14 BRPM | TEMPERATURE: 98.5 F | WEIGHT: 165 LBS | BODY MASS INDEX: 25.01 KG/M2 | HEART RATE: 65 BPM

## 2019-11-15 DIAGNOSIS — R07.9 CHEST PAIN, UNSPECIFIED TYPE: ICD-10-CM

## 2019-11-15 DIAGNOSIS — Z00.00 ANNUAL PHYSICAL EXAM: ICD-10-CM

## 2019-11-15 DIAGNOSIS — Z00.00 ANNUAL PHYSICAL EXAM: Primary | ICD-10-CM

## 2019-11-15 LAB
ALBUMIN SERPL-MCNC: 4.8 G/DL (ref 3.4–5)
ALP BLD-CCNC: 66 U/L (ref 40–129)
ALT SERPL-CCNC: 25 U/L (ref 10–40)
ANION GAP SERPL CALCULATED.3IONS-SCNC: 15 MMOL/L (ref 3–16)
AST SERPL-CCNC: 23 U/L (ref 15–37)
BILIRUB SERPL-MCNC: <0.2 MG/DL (ref 0–1)
BILIRUBIN DIRECT: <0.2 MG/DL (ref 0–0.3)
BILIRUBIN, INDIRECT: NORMAL MG/DL (ref 0–1)
BILIRUBIN, POC: NORMAL
BLOOD URINE, POC: NORMAL
BUN BLDV-MCNC: 17 MG/DL (ref 7–20)
CALCIUM SERPL-MCNC: 8.8 MG/DL (ref 8.3–10.6)
CHLORIDE BLD-SCNC: 104 MMOL/L (ref 99–110)
CHOLESTEROL, TOTAL: 198 MG/DL (ref 0–199)
CLARITY, POC: CLEAR
CO2: 24 MMOL/L (ref 21–32)
COLOR, POC: YELLOW
CREAT SERPL-MCNC: 0.8 MG/DL (ref 0.8–1.3)
GFR AFRICAN AMERICAN: >60
GFR NON-AFRICAN AMERICAN: >60
GLUCOSE BLD-MCNC: 93 MG/DL (ref 70–99)
GLUCOSE URINE, POC: NORMAL
HCT VFR BLD CALC: 41.2 % (ref 40.5–52.5)
HDLC SERPL-MCNC: 72 MG/DL (ref 40–60)
HEMOGLOBIN: 13.9 G/DL (ref 13.5–17.5)
KETONES, POC: NORMAL
LDL CHOLESTEROL CALCULATED: 102 MG/DL
LEUKOCYTE EST, POC: NORMAL
MCH RBC QN AUTO: 31.8 PG (ref 26–34)
MCHC RBC AUTO-ENTMCNC: 33.8 G/DL (ref 31–36)
MCV RBC AUTO: 94.2 FL (ref 80–100)
NITRITE, POC: NORMAL
PDW BLD-RTO: 12.9 % (ref 12.4–15.4)
PH, POC: 6.5
PLATELET # BLD: 186 K/UL (ref 135–450)
PMV BLD AUTO: 9.2 FL (ref 5–10.5)
POTASSIUM SERPL-SCNC: 4.3 MMOL/L (ref 3.5–5.1)
PROTEIN, POC: NORMAL
RBC # BLD: 4.37 M/UL (ref 4.2–5.9)
SODIUM BLD-SCNC: 143 MMOL/L (ref 136–145)
SPECIFIC GRAVITY, POC: 1.02
TOTAL PROTEIN: 6.8 G/DL (ref 6.4–8.2)
TRIGL SERPL-MCNC: 118 MG/DL (ref 0–150)
TSH SERPL DL<=0.05 MIU/L-ACNC: 1.95 UIU/ML (ref 0.27–4.2)
UROBILINOGEN, POC: 0.2
VLDLC SERPL CALC-MCNC: 24 MG/DL
WBC # BLD: 7.2 K/UL (ref 4–11)

## 2019-11-15 PROCEDURE — G8484 FLU IMMUNIZE NO ADMIN: HCPCS | Performed by: FAMILY MEDICINE

## 2019-11-15 PROCEDURE — 81002 URINALYSIS NONAUTO W/O SCOPE: CPT | Performed by: FAMILY MEDICINE

## 2019-11-15 PROCEDURE — 93000 ELECTROCARDIOGRAM COMPLETE: CPT | Performed by: FAMILY MEDICINE

## 2019-11-15 PROCEDURE — 99396 PREV VISIT EST AGE 40-64: CPT | Performed by: FAMILY MEDICINE

## 2019-11-15 RX ORDER — ALBUTEROL SULFATE 90 UG/1
2 AEROSOL, METERED RESPIRATORY (INHALATION) EVERY 4 HOURS PRN
Qty: 1 INHALER | Refills: 0 | Status: SHIPPED | OUTPATIENT
Start: 2019-11-15 | End: 2021-12-07

## 2019-11-15 ASSESSMENT — ENCOUNTER SYMPTOMS
RESPIRATORY NEGATIVE: 1
ALLERGIC/IMMUNOLOGIC NEGATIVE: 1
EYES NEGATIVE: 1
GASTROINTESTINAL NEGATIVE: 1

## 2019-11-15 ASSESSMENT — PATIENT HEALTH QUESTIONNAIRE - PHQ9
SUM OF ALL RESPONSES TO PHQ9 QUESTIONS 1 & 2: 0
SUM OF ALL RESPONSES TO PHQ QUESTIONS 1-9: 0
2. FEELING DOWN, DEPRESSED OR HOPELESS: 0
SUM OF ALL RESPONSES TO PHQ QUESTIONS 1-9: 0
1. LITTLE INTEREST OR PLEASURE IN DOING THINGS: 0

## 2019-11-21 ENCOUNTER — HOSPITAL ENCOUNTER (OUTPATIENT)
Dept: NON INVASIVE DIAGNOSTICS | Age: 64
Discharge: HOME | End: 2019-11-21
Payer: COMMERCIAL

## 2019-11-21 DIAGNOSIS — R07.9 CHEST PAIN, UNSPECIFIED TYPE: ICD-10-CM

## 2019-11-21 LAB
LV EF: 63 %
LVEF MODALITY: NORMAL

## 2019-11-21 PROCEDURE — 78452 HT MUSCLE IMAGE SPECT MULT: CPT

## 2019-11-21 PROCEDURE — 2580000003 HC RX 258: Performed by: FAMILY MEDICINE

## 2019-11-21 PROCEDURE — 93017 CV STRESS TEST TRACING ONLY: CPT

## 2019-11-21 PROCEDURE — 3430000000 HC RX DIAGNOSTIC RADIOPHARMACEUTICAL: Performed by: FAMILY MEDICINE

## 2019-11-21 PROCEDURE — A9502 TC99M TETROFOSMIN: HCPCS | Performed by: FAMILY MEDICINE

## 2019-11-21 RX ORDER — SODIUM CHLORIDE 0.9 % (FLUSH) 0.9 %
10 SYRINGE (ML) INJECTION PRN
Status: DISCONTINUED | OUTPATIENT
Start: 2019-11-21 | End: 2019-11-22 | Stop reason: HOSPADM

## 2019-11-21 RX ADMIN — Medication 10 ML: at 09:38

## 2019-11-21 RX ADMIN — TETROFOSMIN 10 MILLICURIE: 1.38 INJECTION, POWDER, LYOPHILIZED, FOR SOLUTION INTRAVENOUS at 09:38

## 2019-11-22 DIAGNOSIS — Z00.00 ANNUAL PHYSICAL EXAM: Primary | ICD-10-CM

## 2019-11-25 ENCOUNTER — NURSE ONLY (OUTPATIENT)
Dept: PRIMARY CARE CLINIC | Age: 64
End: 2019-11-25
Payer: COMMERCIAL

## 2019-11-25 DIAGNOSIS — Z23 NEED FOR SHINGLES VACCINE: Primary | ICD-10-CM

## 2019-11-25 PROCEDURE — 90750 HZV VACC RECOMBINANT IM: CPT | Performed by: FAMILY MEDICINE

## 2019-11-25 PROCEDURE — 90471 IMMUNIZATION ADMIN: CPT | Performed by: FAMILY MEDICINE

## 2019-12-05 RX ORDER — SILDENAFIL 100 MG/1
100 TABLET, FILM COATED ORAL PRN
Qty: 12 TABLET | Refills: 3 | Status: SHIPPED | OUTPATIENT
Start: 2019-12-05 | End: 2020-09-18

## 2019-12-11 ENCOUNTER — OFFICE VISIT (OUTPATIENT)
Dept: ORTHOPEDIC SURGERY | Age: 64
End: 2019-12-11
Payer: COMMERCIAL

## 2019-12-11 VITALS
WEIGHT: 160 LBS | HEIGHT: 68 IN | SYSTOLIC BLOOD PRESSURE: 109 MMHG | HEART RATE: 65 BPM | BODY MASS INDEX: 24.25 KG/M2 | DIASTOLIC BLOOD PRESSURE: 67 MMHG

## 2019-12-11 DIAGNOSIS — M75.01 ADHESIVE CAPSULITIS OF RIGHT SHOULDER: ICD-10-CM

## 2019-12-11 DIAGNOSIS — M75.41 IMPINGEMENT SYNDROME OF RIGHT SHOULDER: ICD-10-CM

## 2019-12-11 DIAGNOSIS — M75.21 BICEPS TENDINITIS, RIGHT: ICD-10-CM

## 2019-12-11 DIAGNOSIS — M19.011 ARTHRITIS OF RIGHT ACROMIOCLAVICULAR JOINT: Primary | ICD-10-CM

## 2019-12-11 PROCEDURE — G8427 DOCREV CUR MEDS BY ELIG CLIN: HCPCS | Performed by: ORTHOPAEDIC SURGERY

## 2019-12-11 PROCEDURE — 3017F COLORECTAL CA SCREEN DOC REV: CPT | Performed by: ORTHOPAEDIC SURGERY

## 2019-12-11 PROCEDURE — 20610 DRAIN/INJ JOINT/BURSA W/O US: CPT | Performed by: ORTHOPAEDIC SURGERY

## 2019-12-11 PROCEDURE — G8420 CALC BMI NORM PARAMETERS: HCPCS | Performed by: ORTHOPAEDIC SURGERY

## 2019-12-11 PROCEDURE — 99213 OFFICE O/P EST LOW 20 MIN: CPT | Performed by: ORTHOPAEDIC SURGERY

## 2019-12-11 PROCEDURE — G8484 FLU IMMUNIZE NO ADMIN: HCPCS | Performed by: ORTHOPAEDIC SURGERY

## 2019-12-11 PROCEDURE — 4004F PT TOBACCO SCREEN RCVD TLK: CPT | Performed by: ORTHOPAEDIC SURGERY

## 2019-12-13 ENCOUNTER — HOSPITAL ENCOUNTER (OUTPATIENT)
Dept: PHYSICAL THERAPY | Age: 64
Setting detail: THERAPIES SERIES
Discharge: HOME | End: 2019-12-13
Payer: COMMERCIAL

## 2019-12-13 PROCEDURE — 97161 PT EVAL LOW COMPLEX 20 MIN: CPT | Performed by: PHYSICAL THERAPIST

## 2019-12-13 PROCEDURE — 97110 THERAPEUTIC EXERCISES: CPT | Performed by: PHYSICAL THERAPIST

## 2019-12-17 ENCOUNTER — OFFICE VISIT (OUTPATIENT)
Dept: ORTHOPEDIC SURGERY | Age: 64
End: 2019-12-17
Payer: COMMERCIAL

## 2019-12-17 VITALS
SYSTOLIC BLOOD PRESSURE: 115 MMHG | HEIGHT: 68 IN | BODY MASS INDEX: 24.26 KG/M2 | HEART RATE: 59 BPM | DIASTOLIC BLOOD PRESSURE: 64 MMHG | WEIGHT: 160.05 LBS

## 2019-12-17 DIAGNOSIS — M17.0 BILATERAL PRIMARY OSTEOARTHRITIS OF KNEE: Primary | ICD-10-CM

## 2019-12-17 DIAGNOSIS — M25.561 PAIN IN BOTH KNEES, UNSPECIFIED CHRONICITY: ICD-10-CM

## 2019-12-17 DIAGNOSIS — M25.562 PAIN IN BOTH KNEES, UNSPECIFIED CHRONICITY: ICD-10-CM

## 2019-12-17 PROCEDURE — 4004F PT TOBACCO SCREEN RCVD TLK: CPT | Performed by: ORTHOPAEDIC SURGERY

## 2019-12-17 PROCEDURE — 20610 DRAIN/INJ JOINT/BURSA W/O US: CPT | Performed by: ORTHOPAEDIC SURGERY

## 2019-12-17 PROCEDURE — G8420 CALC BMI NORM PARAMETERS: HCPCS | Performed by: ORTHOPAEDIC SURGERY

## 2019-12-17 PROCEDURE — 3017F COLORECTAL CA SCREEN DOC REV: CPT | Performed by: ORTHOPAEDIC SURGERY

## 2019-12-17 PROCEDURE — G8427 DOCREV CUR MEDS BY ELIG CLIN: HCPCS | Performed by: ORTHOPAEDIC SURGERY

## 2019-12-17 PROCEDURE — G8484 FLU IMMUNIZE NO ADMIN: HCPCS | Performed by: ORTHOPAEDIC SURGERY

## 2019-12-17 PROCEDURE — 99214 OFFICE O/P EST MOD 30 MIN: CPT | Performed by: ORTHOPAEDIC SURGERY

## 2019-12-17 RX ORDER — METHYLPREDNISOLONE ACETATE 40 MG/ML
40 INJECTION, SUSPENSION INTRA-ARTICULAR; INTRALESIONAL; INTRAMUSCULAR; SOFT TISSUE ONCE
Status: COMPLETED | OUTPATIENT
Start: 2019-12-17 | End: 2019-12-17

## 2019-12-17 RX ADMIN — METHYLPREDNISOLONE ACETATE 40 MG: 40 INJECTION, SUSPENSION INTRA-ARTICULAR; INTRALESIONAL; INTRAMUSCULAR; SOFT TISSUE at 17:51

## 2019-12-19 ENCOUNTER — HOSPITAL ENCOUNTER (OUTPATIENT)
Dept: PHYSICAL THERAPY | Age: 64
Setting detail: THERAPIES SERIES
Discharge: HOME | End: 2019-12-19
Payer: COMMERCIAL

## 2019-12-19 PROCEDURE — 97110 THERAPEUTIC EXERCISES: CPT | Performed by: PHYSICAL THERAPIST

## 2019-12-31 ENCOUNTER — HOSPITAL ENCOUNTER (OUTPATIENT)
Dept: PHYSICAL THERAPY | Age: 64
Setting detail: THERAPIES SERIES
Discharge: HOME | End: 2019-12-31
Payer: COMMERCIAL

## 2019-12-31 PROCEDURE — 97110 THERAPEUTIC EXERCISES: CPT | Performed by: PHYSICAL THERAPIST

## 2019-12-31 NOTE — FLOWSHEET NOTE
[facility] [address] [place] [address] [facility]    [facility] Therapy Daily Treatment Note  Date:  2019    Patient Name:  Abraham Dickson    :  1955  MRN: [id number]  Restrictions/Precautions:    Medical/Treatment Diagnosis Information:  · Diagnosis: M19.011 (ICD-10-CM) - Arthritis of right acromioclavicular joint  · Treatment Diagnosis: M75.41, M25.611, M25.511, R53.1; leading to impaired functional mobility  Insurance/Certification information:  PT Insurance Information: PT BENEFITS [id number] FACILITY/ EFFECTIVE 10-16-17/ ACTIVE/ DED 2700 MET 2700/ PAYS 80%/ OOP 4400 MET 2885. 05/ [address] /   Physician Information:  Referring Practitioner: Tita Ricardo MD  Has the plan of care been signed (Y/N):        []  Yes  [x]  No     Date of Patient follow up with Physician: 1/10/20      Is this a Progress Report:     []  Yes  [x]  No        If Yes:  Date Range for reporting period:  Beginning  Ending    Progress report will be due (10 Rx or 30 days whichever is less): 99       Recertification will be due (POC Duration  / 90 days whichever is less): 19         Visit # Insurance Allowable Auth Required   3 30 []  Yes [x]  No        Functional Scale:    Date assessed:   UEFI 80/85=792% (0% deficit)  19    Latex Allergy:  [x]NO      []YES  Preferred Language for Healthcare:   [x]English       []other:    Pain level: 7/10     SUBJECTIVE:  Pt states that he continues to feel flexibility is better. He continues to have pain, but it is less.      OBJECTIVE: See eval             ROM PROM AROM  Comment     L R L R     Flexion     158 157 P! 4/10   Abduction     175   P! 4/10   ER   90 95 75 P! 4/10   IR   55 45             Strength L R Comment   Flexion 5/5 4+/5     Abduction 5/5 4+/5    ER 5/5 4+5    IR 5/5 4+/5     Upper Trap 5/5 Not tested d/t pain     Lower Trap 4+/5 4-/5     Mid Trap 4+/5 4-/5     Biceps 5/5 5/5     Triceps 5/5 5/5

## 2020-03-12 ENCOUNTER — PATIENT MESSAGE (OUTPATIENT)
Dept: PRIMARY CARE CLINIC | Age: 65
End: 2020-03-12

## 2020-08-13 ENCOUNTER — OFFICE VISIT (OUTPATIENT)
Dept: ORTHOPEDIC SURGERY | Age: 65
End: 2020-08-13
Payer: COMMERCIAL

## 2020-08-13 VITALS — BODY MASS INDEX: 24.26 KG/M2 | TEMPERATURE: 97.9 F | HEIGHT: 68 IN | WEIGHT: 160.05 LBS

## 2020-08-13 PROCEDURE — 20610 DRAIN/INJ JOINT/BURSA W/O US: CPT | Performed by: PHYSICIAN ASSISTANT

## 2020-08-13 RX ORDER — METHYLPREDNISOLONE ACETATE 40 MG/ML
40 INJECTION, SUSPENSION INTRA-ARTICULAR; INTRALESIONAL; INTRAMUSCULAR; SOFT TISSUE ONCE
Status: COMPLETED | OUTPATIENT
Start: 2020-08-13 | End: 2020-08-13

## 2020-08-13 RX ADMIN — METHYLPREDNISOLONE ACETATE 40 MG: 40 INJECTION, SUSPENSION INTRA-ARTICULAR; INTRALESIONAL; INTRAMUSCULAR; SOFT TISSUE at 12:24

## 2020-08-14 NOTE — PROGRESS NOTES
Chief Complaint    Follow-up (bilateral knees, requesting cortisone injections )      History of Present Illness:  Tom Dickson is a 62 y.o. male who presents for corticosteroid injections of both knees. To recap, the patient is able to walk 4-5 miles approx 5 times per week. He is currently retired. Pain comes and goes based on actvities. His pain is worse with stairs and squatting. He denies any pain at night or pain that wakes him up at night. He notes crepitus but denies any mechanical symptoms or instability. Patient has noted an insidious return of his baseline osteoarthritic symptoms. He denies any change in quality or character of his baseline osteoarthritic symptoms. The patient denies any new injury. The patient denies any popping or locking of the joint. The patient denies any numbness, paresthesias, or weakness. Physical exam:  Inspection: Both knees without erythema, ecchymosis, discoloration, abrasion, contusions, deformity or signs of infection. Palpation: no joint line of both knees. patellofemoral crepitus palpable in both knees. No tenderness to palpation to any other osseous or soft tissue structures of the knee. Range of motion: Grossly intact  Neurovascular: Patient is neurovascularly intact, equally bilaterally. 2+ dorsal pedal pulses. Procedures:    After informed consent was provided, the patient was seated on the exam table with the right knee flexed to 90 degrees. The anterolateral aspect of the knee adjacent to the joint line was prepped with Chlora-prep. The skin and subcutaneous tissues were anesthetized with ethyl chloride spray. A 22-gauge needle was inserted into the right knee and 2 ml of 40 mg/ml DepoMedrol was injected. The needle was withdrawn and the puncture site sealed with a Band-Aid. The patient tolerated the procedure well. Post injection instructions and precautions given and any problems to notify us.   (Conducted by Kimble American)

## 2020-09-18 ENCOUNTER — OFFICE VISIT (OUTPATIENT)
Dept: PRIMARY CARE CLINIC | Age: 65
End: 2020-09-18
Payer: COMMERCIAL

## 2020-09-18 VITALS
HEIGHT: 68 IN | SYSTOLIC BLOOD PRESSURE: 106 MMHG | WEIGHT: 165 LBS | OXYGEN SATURATION: 97 % | BODY MASS INDEX: 25.01 KG/M2 | TEMPERATURE: 98 F | HEART RATE: 65 BPM | DIASTOLIC BLOOD PRESSURE: 68 MMHG

## 2020-09-18 DIAGNOSIS — Z00.00 ANNUAL PHYSICAL EXAM: ICD-10-CM

## 2020-09-18 LAB
ALBUMIN SERPL-MCNC: 4.3 G/DL (ref 3.4–5)
ALP BLD-CCNC: 59 U/L (ref 40–129)
ALT SERPL-CCNC: 28 U/L (ref 10–40)
ANION GAP SERPL CALCULATED.3IONS-SCNC: 7 MMOL/L (ref 3–16)
AST SERPL-CCNC: 23 U/L (ref 15–37)
BILIRUB SERPL-MCNC: 0.3 MG/DL (ref 0–1)
BILIRUBIN DIRECT: <0.2 MG/DL (ref 0–0.3)
BILIRUBIN, INDIRECT: NORMAL MG/DL (ref 0–1)
BUN BLDV-MCNC: 26 MG/DL (ref 7–20)
CALCIUM SERPL-MCNC: 8.9 MG/DL (ref 8.3–10.6)
CHLORIDE BLD-SCNC: 102 MMOL/L (ref 99–110)
CHOLESTEROL, TOTAL: 185 MG/DL (ref 0–199)
CO2: 25 MMOL/L (ref 21–32)
CREAT SERPL-MCNC: 0.8 MG/DL (ref 0.8–1.3)
GFR AFRICAN AMERICAN: >60
GFR NON-AFRICAN AMERICAN: >60
GLUCOSE BLD-MCNC: 95 MG/DL (ref 70–99)
HCT VFR BLD CALC: 41.9 % (ref 40.5–52.5)
HDLC SERPL-MCNC: 74 MG/DL (ref 40–60)
HEMOGLOBIN: 14 G/DL (ref 13.5–17.5)
LDL CHOLESTEROL CALCULATED: 99 MG/DL
MCH RBC QN AUTO: 30.8 PG (ref 26–34)
MCHC RBC AUTO-ENTMCNC: 33.4 G/DL (ref 31–36)
MCV RBC AUTO: 92.4 FL (ref 80–100)
PDW BLD-RTO: 12.9 % (ref 12.4–15.4)
PLATELET # BLD: 185 K/UL (ref 135–450)
PMV BLD AUTO: 8.7 FL (ref 5–10.5)
POTASSIUM SERPL-SCNC: 4.6 MMOL/L (ref 3.5–5.1)
RBC # BLD: 4.54 M/UL (ref 4.2–5.9)
SODIUM BLD-SCNC: 134 MMOL/L (ref 136–145)
TOTAL PROTEIN: 6.4 G/DL (ref 6.4–8.2)
TRIGL SERPL-MCNC: 58 MG/DL (ref 0–150)
TSH SERPL DL<=0.05 MIU/L-ACNC: 1.56 UIU/ML (ref 0.27–4.2)
VLDLC SERPL CALC-MCNC: 12 MG/DL
WBC # BLD: 6.5 K/UL (ref 4–11)

## 2020-09-18 PROCEDURE — 99396 PREV VISIT EST AGE 40-64: CPT | Performed by: FAMILY MEDICINE

## 2020-09-18 RX ORDER — PRAVASTATIN SODIUM 40 MG
40 TABLET ORAL DAILY
Qty: 90 TABLET | Refills: 3 | Status: SHIPPED | OUTPATIENT
Start: 2020-09-18 | End: 2021-09-24

## 2020-09-18 RX ORDER — TADALAFIL 20 MG/1
20 TABLET ORAL DAILY PRN
Qty: 12 TABLET | Refills: 1 | Status: SHIPPED | OUTPATIENT
Start: 2020-09-18 | End: 2021-03-23 | Stop reason: SDUPTHER

## 2020-09-18 ASSESSMENT — ENCOUNTER SYMPTOMS
GASTROINTESTINAL NEGATIVE: 1
EYES NEGATIVE: 1
ALLERGIC/IMMUNOLOGIC NEGATIVE: 1
RESPIRATORY NEGATIVE: 1

## 2020-09-18 ASSESSMENT — PATIENT HEALTH QUESTIONNAIRE - PHQ9
SUM OF ALL RESPONSES TO PHQ9 QUESTIONS 1 & 2: 0
SUM OF ALL RESPONSES TO PHQ QUESTIONS 1-9: 0
1. LITTLE INTEREST OR PLEASURE IN DOING THINGS: 0
2. FEELING DOWN, DEPRESSED OR HOPELESS: 0
SUM OF ALL RESPONSES TO PHQ QUESTIONS 1-9: 0

## 2020-09-18 NOTE — PROGRESS NOTES
SUBJECTIVE:  Patient ID: Katie Dickson is a 62 y.o. y.o. male     HPI   Pt is here for annual physical   Overall doing well no concern  Patient with COPD very stable not using anything  Patient with hyperlipidemia on statin is fasting today  Patient with ED he tried Viagra he wants to try something else did not work for him better no change  Past Medical History:   Diagnosis Date    COPD     mild- no inhalers    Elevated liver function tests     Giardiasis     Hyperlipidemia     Kidney stone       Past Surgical History:   Procedure Laterality Date    CARDIOVASCULAR STRESS TEST  feb 2008    negative    CARDIOVASCULAR STRESS TEST  Jan 2010    [facility] COLONOSCOPY  7/2004    Dr. Angelica Pope  4/2008    Dr. Angelica Pope  2013    OTHER SURGICAL HISTORY Left 04/20/2015    LEFT KNEE OPEN PERONEAL NERVE DECOMPRESSION    SKIN BIOPSY  Jan 2011    telangiectasia, benign     Family History   Problem Relation Age of Onset    Cancer Father         Prostate ca in 63's    Heart Attack Brother         At age 52   [de-identified] Coronary Art Dis Brother      Social History     Socioeconomic History    Marital status:      Spouse name: None    Number of children: None    Years of education: None    Highest education level: None   Occupational History    Occupation:    Social Needs    Financial resource strain: None    Food insecurity     Worry: None     Inability: None    Transportation needs     Medical: None     Non-medical: None   Tobacco Use    Smoking status: Current Every Day Smoker     Packs/day: 0.50     Years: 15.00     Pack years: 7.50     Types: Cigarettes    Smokeless tobacco: Never Used   Substance and Sexual Activity    Alcohol use:  Yes     Alcohol/week: 12.0 standard drinks     Types: 12 Cans of beer per week    Drug use: No    Sexual activity: None   Lifestyle    Physical activity     Days per week: None     Minutes per session: None    Stress: None   Relationships  Social connections     Talks on phone: None     Gets together: None     Attends Church service: None     Active member of club or organization: None     Attends meetings of clubs or organizations: None     Relationship status: None    Intimate partner violence     Fear of current or ex partner: None     Emotionally abused: None     Physically abused: None     Forced sexual activity: None   Other Topics Concern    None   Social History Narrative    None     Current Outpatient Medications   Medication Sig Dispense Refill    pravastatin (PRAVACHOL) 40 MG tablet Take 1 tablet by mouth daily 90 tablet 3    sildenafil (VIAGRA) 100 MG tablet Take 1 tablet by mouth as needed for Erectile Dysfunction 12 tablet 3    fish oil-omega-3 fatty acids (FISH OIL) 1000 MG capsule Take 1 g by mouth daily.  Flaxseed, Linseed, (FLAX SEED OIL) 1000 MG CAPS Take  by mouth.  aspirin 81 MG tablet Take 81 mg by mouth daily.  albuterol sulfate HFA (PROVENTIL HFA) 108 (90 Base) MCG/ACT inhaler Inhale 2 puffs into the lungs every 4 hours as needed for Wheezing or Shortness of Breath (Patient not taking: Reported on 9/18/2020) 1 Inhaler 0     No current facility-administered medications for this visit. Allergies   Allergen Reactions    Codeine Nausea Only    Flagyl [Metronidazole] Hives    Lipitor Other (See Comments)     Elevated liver function tests    Naprosyn [Naproxen] Swelling       Review of Systems   Constitutional: Negative. HENT: Negative. Eyes: Negative. Respiratory: Negative. Cardiovascular: Negative. Gastrointestinal: Negative. Endocrine: Negative. Genitourinary: Negative. Musculoskeletal: Negative. Allergic/Immunologic: Negative. Neurological: Negative. Hematological: Negative. Psychiatric/Behavioral: Negative. All other systems reviewed and are negative.       OBJECTIVE:  /68 (Site: Right Upper Arm, Position: Sitting, Cuff Size: Medium Adult)   Pulse 65   Temp 98 °F (36.7 °C) (Temporal)   Ht 5' 7.99\" (1.727 m)   Wt 165 lb (74.8 kg)   SpO2 97% Comment: RA  BMI 25.10 kg/m²     Physical Exam  Vitals signs reviewed. Constitutional:       General: He is not in acute distress. Appearance: He is well-developed. He is not diaphoretic. HENT:      Head: Normocephalic and atraumatic. Right Ear: External ear normal.      Left Ear: External ear normal.      Nose: Nose normal.      Mouth/Throat:      Pharynx: No oropharyngeal exudate. Eyes:      General: No scleral icterus. Right eye: No discharge. Left eye: No discharge. Conjunctiva/sclera: Conjunctivae normal.      Pupils: Pupils are equal, round, and reactive to light. Neck:      Musculoskeletal: Normal range of motion and neck supple. Thyroid: No thyromegaly. Vascular: No JVD. Trachea: No tracheal deviation. Cardiovascular:      Rate and Rhythm: Normal rate and regular rhythm. Heart sounds: Normal heart sounds. No murmur. No friction rub. No gallop. Pulmonary:      Effort: Pulmonary effort is normal. No respiratory distress. Breath sounds: Normal breath sounds. No stridor. No wheezing or rales. Chest:      Chest wall: No tenderness. Abdominal:      General: Bowel sounds are normal. There is no distension. Palpations: Abdomen is soft. There is no mass. Tenderness: There is no abdominal tenderness. There is no guarding or rebound. Musculoskeletal: Normal range of motion. General: No tenderness. Lymphadenopathy:      Cervical: No cervical adenopathy. Skin:     General: Skin is warm and dry. Coloration: Skin is not pale. Findings: No erythema or rash. Neurological:      Mental Status: He is alert and oriented to person, place, and time. Cranial Nerves: No cranial nerve deficit. Motor: No abnormal muscle tone.       Coordination: Coordination normal.      Deep Tendon Reflexes: Reflexes are normal and symmetric. Reflexes normal.   Psychiatric:         Behavior: Behavior normal.         Thought Content: Thought content normal.         Judgment: Judgment normal.         ASSESSMENT:     Diagnosis Orders   1. Annual physical exam  Basic Metabolic Panel    CBC    Hepatic Function Panel    TSH without Reflex    Lipid Panel    pravastatin (PRAVACHOL) 40 MG tablet   2.  Erectile dysfunction, unspecified erectile dysfunction type         PLAN:  See orders  Trial of Cialis if not better may need to have further testing or see urologist.

## 2021-03-23 RX ORDER — TADALAFIL 20 MG/1
20 TABLET ORAL DAILY PRN
Qty: 12 TABLET | Refills: 1 | Status: SHIPPED | OUTPATIENT
Start: 2021-03-23 | End: 2021-10-12 | Stop reason: SDUPTHER

## 2021-06-02 ENCOUNTER — CLINICAL DOCUMENTATION (OUTPATIENT)
Dept: OTHER | Age: 66
End: 2021-06-02

## 2021-09-17 ENCOUNTER — TELEPHONE (OUTPATIENT)
Dept: PRIMARY CARE CLINIC | Age: 66
End: 2021-09-17

## 2021-09-17 DIAGNOSIS — Z11.52 ENCOUNTER FOR SCREENING FOR COVID-19: Primary | ICD-10-CM

## 2021-09-22 DIAGNOSIS — Z11.52 ENCOUNTER FOR SCREENING FOR COVID-19: ICD-10-CM

## 2021-10-12 ENCOUNTER — OFFICE VISIT (OUTPATIENT)
Dept: PRIMARY CARE CLINIC | Age: 66
End: 2021-10-12
Payer: COMMERCIAL

## 2021-10-12 VITALS
WEIGHT: 167.2 LBS | HEART RATE: 62 BPM | TEMPERATURE: 98.1 F | HEIGHT: 69 IN | OXYGEN SATURATION: 98 % | DIASTOLIC BLOOD PRESSURE: 80 MMHG | SYSTOLIC BLOOD PRESSURE: 118 MMHG | BODY MASS INDEX: 24.76 KG/M2

## 2021-10-12 DIAGNOSIS — Z00.00 ANNUAL PHYSICAL EXAM: Primary | ICD-10-CM

## 2021-10-12 DIAGNOSIS — Z23 NEED FOR INFLUENZA VACCINATION: ICD-10-CM

## 2021-10-12 DIAGNOSIS — Z00.00 ENCOUNTER FOR WELL ADULT EXAM WITHOUT ABNORMAL FINDINGS: ICD-10-CM

## 2021-10-12 LAB
BILIRUBIN, POC: NORMAL
BLOOD URINE, POC: NORMAL
CLARITY, POC: CLEAR
COLOR, POC: YELLOW
GLUCOSE URINE, POC: NORMAL
KETONES, POC: NORMAL
LEUKOCYTE EST, POC: NORMAL
NITRITE, POC: NORMAL
PH, POC: 6.5
PROTEIN, POC: NORMAL
SPECIFIC GRAVITY, POC: 1.02
UROBILINOGEN, POC: 0.2

## 2021-10-12 PROCEDURE — 90471 IMMUNIZATION ADMIN: CPT | Performed by: FAMILY MEDICINE

## 2021-10-12 PROCEDURE — G8482 FLU IMMUNIZE ORDER/ADMIN: HCPCS | Performed by: FAMILY MEDICINE

## 2021-10-12 PROCEDURE — 81002 URINALYSIS NONAUTO W/O SCOPE: CPT | Performed by: FAMILY MEDICINE

## 2021-10-12 PROCEDURE — 90674 CCIIV4 VAC NO PRSV 0.5 ML IM: CPT | Performed by: FAMILY MEDICINE

## 2021-10-12 PROCEDURE — 99396 PREV VISIT EST AGE 40-64: CPT | Performed by: FAMILY MEDICINE

## 2021-10-12 RX ORDER — TADALAFIL 20 MG/1
20 TABLET ORAL DAILY PRN
Qty: 12 TABLET | Refills: 1 | Status: SHIPPED | OUTPATIENT
Start: 2021-10-12 | End: 2022-10-17

## 2021-10-12 SDOH — HEALTH STABILITY: MENTAL HEALTH
STRESS IS WHEN SOMEONE FEELS TENSE, NERVOUS, ANXIOUS, OR CAN'T SLEEP AT NIGHT BECAUSE THEIR MIND IS TROUBLED. HOW STRESSED ARE YOU?: NOT AT ALL

## 2021-10-12 SDOH — ECONOMIC STABILITY: FOOD INSECURITY: WITHIN THE PAST 12 MONTHS, YOU WORRIED THAT YOUR FOOD WOULD RUN OUT BEFORE YOU GOT MONEY TO BUY MORE.: NEVER TRUE

## 2021-10-12 SDOH — ECONOMIC STABILITY: FOOD INSECURITY: WITHIN THE PAST 12 MONTHS, THE FOOD YOU BOUGHT JUST DIDN'T LAST AND YOU DIDN'T HAVE MONEY TO GET MORE.: NEVER TRUE

## 2021-10-12 SDOH — HEALTH STABILITY: PHYSICAL HEALTH: ON AVERAGE, HOW MANY DAYS PER WEEK DO YOU ENGAGE IN MODERATE TO STRENUOUS EXERCISE (LIKE A BRISK WALK)?: 5 DAYS

## 2021-10-12 SDOH — ECONOMIC STABILITY: TRANSPORTATION INSECURITY
IN THE PAST 12 MONTHS, HAS THE LACK OF TRANSPORTATION KEPT YOU FROM MEDICAL APPOINTMENTS OR FROM GETTING MEDICATIONS?: NO

## 2021-10-12 SDOH — ECONOMIC STABILITY: TRANSPORTATION INSECURITY
IN THE PAST 12 MONTHS, HAS LACK OF TRANSPORTATION KEPT YOU FROM MEETINGS, WORK, OR FROM GETTING THINGS NEEDED FOR DAILY LIVING?: NO

## 2021-10-12 SDOH — ECONOMIC STABILITY: INCOME INSECURITY: IN THE LAST 12 MONTHS, WAS THERE A TIME WHEN YOU WERE NOT ABLE TO PAY THE MORTGAGE OR RENT ON TIME?: NO

## 2021-10-12 SDOH — ECONOMIC STABILITY: HOUSING INSECURITY: IN THE LAST 12 MONTHS, HOW MANY PLACES HAVE YOU LIVED?: 1

## 2021-10-12 SDOH — SOCIAL STABILITY: SOCIAL NETWORK: HOW OFTEN DO YOU GET TOGETHER WITH FRIENDS OR RELATIVES?: TWICE A WEEK

## 2021-10-12 SDOH — HEALTH STABILITY: MENTAL HEALTH: HOW MANY STANDARD DRINKS CONTAINING ALCOHOL DO YOU HAVE ON A TYPICAL DAY?: 3 OR 4

## 2021-10-12 SDOH — SOCIAL STABILITY: SOCIAL NETWORK: HOW OFTEN DO YOU ATTENT MEETINGS OF THE CLUB OR ORGANIZATION YOU BELONG TO?: NEVER

## 2021-10-12 SDOH — HEALTH STABILITY: PHYSICAL HEALTH: ON AVERAGE, HOW MANY MINUTES DO YOU ENGAGE IN EXERCISE AT THIS LEVEL?: 50 MIN

## 2021-10-12 SDOH — ECONOMIC STABILITY: INCOME INSECURITY: HOW HARD IS IT FOR YOU TO PAY FOR THE VERY BASICS LIKE FOOD, HOUSING, MEDICAL CARE, AND HEATING?: NOT HARD AT ALL

## 2021-10-12 SDOH — ECONOMIC STABILITY: HOUSING INSECURITY
IN THE LAST 12 MONTHS, WAS THERE A TIME WHEN YOU DID NOT HAVE A STEADY PLACE TO SLEEP OR SLEPT IN A SHELTER (INCLUDING NOW)?: NO

## 2021-10-12 SDOH — SOCIAL STABILITY: SOCIAL NETWORK: ARE YOU MARRIED, WIDOWED, DIVORCED, SEPARATED, NEVER MARRIED, OR LIVING WITH A PARTNER?: MARRIED

## 2021-10-12 SDOH — HEALTH STABILITY: MENTAL HEALTH: HOW OFTEN DO YOU HAVE A DRINK CONTAINING ALCOHOL?: 2-4 TIMES A MONTH

## 2021-10-12 SDOH — ECONOMIC STABILITY: HOUSING INSECURITY: IN THE LAST 12 MONTHS, HOW MANY PLACES HAVE YOU LIVED?: 2

## 2021-10-12 SDOH — SOCIAL STABILITY: SOCIAL NETWORK
IN A TYPICAL WEEK, HOW MANY TIMES DO YOU TALK ON THE PHONE WITH FAMILY, FRIENDS, OR NEIGHBORS?: MORE THAN THREE TIMES A WEEK

## 2021-10-12 ASSESSMENT — PATIENT HEALTH QUESTIONNAIRE - PHQ9
2. FEELING DOWN, DEPRESSED OR HOPELESS: NOT AT ALL
SUM OF ALL RESPONSES TO PHQ9 QUESTIONS 1 & 2: 0
DEPRESSION UNABLE TO ASSESS: YES
1. LITTLE INTEREST OR PLEASURE IN DOING THINGS: NOT AT ALL

## 2021-10-12 ASSESSMENT — SOCIAL DETERMINANTS OF HEALTH (SDOH): HOW HARD IS IT FOR YOU TO PAY FOR THE VERY BASICS LIKE FOOD, HOUSING, MEDICAL CARE, AND HEATING?: NOT HARD AT ALL

## 2021-10-12 NOTE — PROGRESS NOTES
Well Adult Note  Name: Vitaliy Hutsonllor Date: 10/12/2021   MRN: <[id number]> Sex: Male   Age: 60 y.o. Ethnicity: Non- / Non    : 1955 Race: White (non-)      Kennard Babinski [place] is here for well adult exam.  History:  Pt is here for annual physical exam  Overall doing well  No concern  Patient with hyperlipidemia doing well on Pravachol  He asked for refill on his Cialis is not working as good per the patient I offered him to see urology is not interested  He had a diagnosed with COPD he never uses any inhalers denies any shortness of breath any other symptoms      Review of Systems   Constitutional: Negative. HENT: Negative. Eyes: Negative. Respiratory: Negative. Cardiovascular: Negative. Gastrointestinal: Negative. Endocrine: Negative. Genitourinary: Negative. Musculoskeletal: Negative. Allergic/Immunologic: Negative. Neurological: Negative. Hematological: Negative. Psychiatric/Behavioral: Negative. All other systems reviewed and are negative. Allergies   Allergen Reactions    Codeine Nausea Only    Flagyl [Metronidazole] Hives    Lipitor Other (See Comments)     Elevated liver function tests    Naprosyn [Naproxen] Swelling       Prior to Visit Medications    Medication Sig Taking? Authorizing Provider   pravastatin (PRAVACHOL) 40 MG tablet TAKE 1 TABLET BY MOUTH  DAILY Yes Sully Olson MD   tadalafil (CIALIS) 20 MG tablet Take 1 tablet by mouth daily as needed for Erectile Dysfunction Yes Sully Olson MD   fish oil-omega-3 fatty acids (FISH OIL) 1000 MG capsule Take 1 g by mouth daily. Yes Historical Provider, MD   Flaxseed, Linseed, (FLAX SEED OIL) 1000 MG CAPS Take  by mouth. Yes Historical Provider, MD   aspirin 81 MG tablet Take 81 mg by mouth daily.  Yes Historical Provider, MD   albuterol sulfate HFA (PROVENTIL HFA) 108 (90 Base) MCG/ACT inhaler Inhale 2 puffs into the lungs every 4 hours as needed for Wheezing or Shortness of Breath  Patient not taking: Reported on 9/18/2020  Diaz Galan MD       Past Medical History:   Diagnosis Date    COPD     mild- no inhalers    Elevated liver function tests     Giardiasis     Hyperlipidemia     Kidney stone        Past Surgical History:   Procedure Laterality Date    CARDIOVASCULAR STRESS TEST  feb 2008    negative    CARDIOVASCULAR STRESS TEST  Jan 2010    [facility] COLONOSCOPY  7/2004    Dr. Bedelia Spurling  4/2008    Dr. Mosqueda Simple COLONOSCOPY  2013    OTHER SURGICAL HISTORY Left 04/20/2015    LEFT KNEE OPEN PERONEAL NERVE DECOMPRESSION    SKIN BIOPSY  Jan 2011    telangiectasia, benign       Family History   Problem Relation Age of Onset    Cancer Father         Prostate ca in 63's    Heart Attack Brother         At age 52    Coronary Art Dis Brother        Social History     Tobacco Use    Smoking status: Current Every Day Smoker     Packs/day: 0.50     Years: 15.00     Pack years: 7.50     Types: Cigarettes    Smokeless tobacco: Never Used   Vaping Use    Vaping Use: Never used   Substance Use Topics    Alcohol use: Yes     Alcohol/week: 12.0 standard drinks     Types: 12 Cans of beer per week    Drug use: No       Objective   /80 (Site: Right Upper Arm, Position: Sitting, Cuff Size: Small Adult)   Pulse 62   Temp 98.1 °F (36.7 °C)   Ht 5' 9\" (1.753 m)   Wt 167 lb 3.2 oz (75.8 kg)   SpO2 98%   BMI 24.69 kg/m²   Wt Readings from Last 3 Encounters:   10/12/21 167 lb 3.2 oz (75.8 kg)   09/18/20 165 lb (74.8 kg)   08/13/20 160 lb 0.9 oz (72.6 kg)       Physical Exam  Vitals reviewed. Constitutional:       General: He is not in acute distress. Appearance: He is well-developed. He is not diaphoretic. HENT:      Head: Normocephalic and atraumatic. Right Ear: External ear normal.      Left Ear: External ear normal.      Nose: Nose normal.      Mouth/Throat:      Pharynx: No oropharyngeal exudate. Eyes:      General: No scleral icterus.         Right eye: No discharge. Left eye: No discharge. Conjunctiva/sclera: Conjunctivae normal.      Pupils: Pupils are equal, round, and reactive to light. Neck:      Thyroid: No thyromegaly. Vascular: No JVD. Trachea: No tracheal deviation. Cardiovascular:      Rate and Rhythm: Normal rate and regular rhythm. Heart sounds: Normal heart sounds. No murmur heard. No friction rub. No gallop. Pulmonary:      Effort: Pulmonary effort is normal. No respiratory distress. Breath sounds: Normal breath sounds. No stridor. No wheezing or rales. Chest:      Chest wall: No tenderness. Abdominal:      General: Bowel sounds are normal. There is no distension. Palpations: Abdomen is soft. There is no mass. Tenderness: There is no abdominal tenderness. There is no guarding or rebound. Musculoskeletal:         General: No tenderness. Normal range of motion. Cervical back: Normal range of motion and neck supple. Lymphadenopathy:      Cervical: No cervical adenopathy. Skin:     General: Skin is warm and dry. Coloration: Skin is not pale. Findings: No erythema or rash. Neurological:      Mental Status: He is alert and oriented to person, place, and time. Cranial Nerves: No cranial nerve deficit. Motor: No abnormal muscle tone. Coordination: Coordination normal.      Deep Tendon Reflexes: Reflexes are normal and symmetric. Reflexes normal.   Psychiatric:         Behavior: Behavior normal.         Thought Content: Thought content normal.         Judgment: Judgment normal.           Assessment   Plan       Diagnosis Orders   1. Annual physical exam  POCT Urinalysis no Micro    Basic Metabolic Panel    CBC    Hepatic Function Panel    TSH without Reflex    Lipid Panel    PSA, Prostatic Specific Antigen   2. Encounter for well adult exam without abnormal findings     3.  Need for influenza vaccination  INFLUENZA, MDCK QUADV, 2 YRS AND OLDER, IM, PF, PREFILL SYR OR SDV, 0.5ML (FLUCELVAX QUADV, PF)           Personalized Preventive Plan   Current Health Maintenance Status  Immunization History   Administered Date(s) Administered    Influenza Virus Vaccine 02/05/2010    Influenza, Intradermal, Preservative free 09/02/2016    Influenza, Intradermal, Quadrivalent, Preservative Free 11/02/2017    Influenza, Quadv, IM, PF (6 mo and older Fluzone, Flulaval, Fluarix, and 3 yrs and older Afluria) 10/23/2018    Pneumococcal Conjugate 13-valent ([id number]) 09/02/2016    Pneumococcal Polysaccharide ([id number]) 11/02/2017    Td, unspecified formulation 02/05/2010    Tdap (Boostrix, Adacel) 02/05/2010, 08/13/2019    Zoster Live (Zostavax) 11/16/2017    Zoster Recombinant (Shingrix) 09/17/2019, 11/25/2019        Health Maintenance   Topic Date Due    COVID-19 Vaccine (1) Never done    Flu vaccine (1) 09/01/2021    Lipid screen  09/18/2021    Pneumococcal 0-64 years Vaccine (2 of 2 - PPSV23) 11/02/2022    Colon cancer screen colonoscopy  11/07/2023    DTaP/Tdap/Td vaccine (2 - Td or Tdap) 08/13/2029    Shingles Vaccine  Completed    Hepatitis C screen  Completed    HIV screen  Completed    Hepatitis A vaccine  Aged Out    Hepatitis B vaccine  Aged Out    Hib vaccine  Aged Out    Meningococcal (ACWY) vaccine  Aged Out     Recommendations for [employer] Due: see orders and patient instructions/AVS.  .

## 2021-10-12 NOTE — PROGRESS NOTES
Vaccine Information Sheet, \"Influenza - Inactivated\"  given to Nikole Dickson, or parent/legal guardian of  Nikole Dickson and verbalized understanding. Patient responses:    Have you ever had a reaction to a flu vaccine? No  Are you able to eat eggs without adverse effects? Yes  Do you have any current illness? No  Have you ever had [place] Syndrome? No    Flu vaccine given per order. Please see immunization tab.

## 2021-10-14 DIAGNOSIS — R97.20 ABNORMAL PSA: Primary | ICD-10-CM

## 2021-12-07 ENCOUNTER — OFFICE VISIT (OUTPATIENT)
Dept: PRIMARY CARE CLINIC | Age: 66
End: 2021-12-07
Payer: COMMERCIAL

## 2021-12-07 VITALS
HEART RATE: 65 BPM | DIASTOLIC BLOOD PRESSURE: 70 MMHG | WEIGHT: 170 LBS | TEMPERATURE: 98.2 F | SYSTOLIC BLOOD PRESSURE: 130 MMHG | BODY MASS INDEX: 25.1 KG/M2 | RESPIRATION RATE: 14 BRPM | OXYGEN SATURATION: 96 %

## 2021-12-07 DIAGNOSIS — Z01.818 PREOP EXAMINATION: Primary | ICD-10-CM

## 2021-12-07 DIAGNOSIS — R97.20 ABNORMAL PSA: ICD-10-CM

## 2021-12-07 DIAGNOSIS — J41.0 SIMPLE CHRONIC BRONCHITIS (HCC): ICD-10-CM

## 2021-12-07 DIAGNOSIS — Z01.818 PREOP EXAMINATION: ICD-10-CM

## 2021-12-07 DIAGNOSIS — G60.9 HEREDITARY AND IDIOPATHIC PERIPHERAL NEUROPATHY: ICD-10-CM

## 2021-12-07 LAB
ANION GAP SERPL CALCULATED.3IONS-SCNC: 12 MMOL/L (ref 3–16)
BUN BLDV-MCNC: 18 MG/DL (ref 7–20)
CALCIUM SERPL-MCNC: 9 MG/DL (ref 8.3–10.6)
CHLORIDE BLD-SCNC: 102 MMOL/L (ref 99–110)
CO2: 23 MMOL/L (ref 21–32)
CREAT SERPL-MCNC: 0.9 MG/DL (ref 0.8–1.3)
GFR AFRICAN AMERICAN: >60
GFR NON-AFRICAN AMERICAN: >60
GLUCOSE BLD-MCNC: 101 MG/DL (ref 70–99)
HCT VFR BLD CALC: 46.1 % (ref 40.5–52.5)
HEMOGLOBIN: 15 G/DL (ref 13.5–17.5)
MCH RBC QN AUTO: 30.4 PG (ref 26–34)
MCHC RBC AUTO-ENTMCNC: 32.4 G/DL (ref 31–36)
MCV RBC AUTO: 93.6 FL (ref 80–100)
PDW BLD-RTO: 13 % (ref 12.4–15.4)
PLATELET # BLD: 188 K/UL (ref 135–450)
PMV BLD AUTO: 9.1 FL (ref 5–10.5)
POTASSIUM SERPL-SCNC: 4.9 MMOL/L (ref 3.5–5.1)
RBC # BLD: 4.93 M/UL (ref 4.2–5.9)
SODIUM BLD-SCNC: 137 MMOL/L (ref 136–145)
WBC # BLD: 6 K/UL (ref 4–11)

## 2021-12-07 PROCEDURE — G8419 CALC BMI OUT NRM PARAM NOF/U: HCPCS | Performed by: FAMILY MEDICINE

## 2021-12-07 PROCEDURE — G8427 DOCREV CUR MEDS BY ELIG CLIN: HCPCS | Performed by: FAMILY MEDICINE

## 2021-12-07 PROCEDURE — G8482 FLU IMMUNIZE ORDER/ADMIN: HCPCS | Performed by: FAMILY MEDICINE

## 2021-12-07 PROCEDURE — 3023F SPIROM DOC REV: CPT | Performed by: FAMILY MEDICINE

## 2021-12-07 PROCEDURE — 99214 OFFICE O/P EST MOD 30 MIN: CPT | Performed by: FAMILY MEDICINE

## 2021-12-07 PROCEDURE — G8926 SPIRO NO PERF OR DOC: HCPCS | Performed by: FAMILY MEDICINE

## 2021-12-07 PROCEDURE — 93000 ELECTROCARDIOGRAM COMPLETE: CPT | Performed by: FAMILY MEDICINE

## 2021-12-07 ASSESSMENT — PATIENT HEALTH QUESTIONNAIRE - PHQ9
SUM OF ALL RESPONSES TO PHQ QUESTIONS 1-9: 0
SUM OF ALL RESPONSES TO PHQ9 QUESTIONS 1 & 2: 0
1. LITTLE INTEREST OR PLEASURE IN DOING THINGS: 0
SUM OF ALL RESPONSES TO PHQ QUESTIONS 1-9: 0
SUM OF ALL RESPONSES TO PHQ QUESTIONS 1-9: 0
2. FEELING DOWN, DEPRESSED OR HOPELESS: 0

## 2021-12-07 ASSESSMENT — ENCOUNTER SYMPTOMS
EYES NEGATIVE: 1
ALLERGIC/IMMUNOLOGIC NEGATIVE: 1
GASTROINTESTINAL NEGATIVE: 1
RESPIRATORY NEGATIVE: 1

## 2021-12-07 NOTE — PATIENT INSTRUCTIONS
Patient Education        Learning About Benefits From Quitting Smoking  How does quitting smoking make you healthier? If you're thinking about quitting smoking, you may have a few reasons to be smoke-free. Your health may be one of them. · When you quit smoking, you lower your risks for cancer, lung disease, heart attack, stroke, blood vessel disease, and blindness from macular degeneration. · When you're smoke-free, you get sick less often, and you heal faster. You are less likely to get colds, flu, bronchitis, and pneumonia. · As a nonsmoker, you may find that your mood is better and you are less stressed. When and how will you feel healthier? Quitting has real health benefits that start from day 1 of being smoke-free. And the longer you stay smoke-free, the healthier you get and the better you feel. The first hours  · After just 20 minutes, your blood pressure and heart rate go down. That means there's less stress on your heart and blood vessels. · Within 12 hours, the level of carbon monoxide in your blood drops back to normal. That makes room for more oxygen. With more oxygen in your body, you may notice that you have more energy than when you smoked. After 2 weeks  · Your lungs start to work better. · Your risk of heart attack starts to drop. After 1 month  · When your lungs are clear, you cough less and breathe deeper, so it's easier to be active. · Your sense of taste and smell return. That means you can enjoy food more than you have since you started smoking. Over the years  · Over the years, your risks of heart disease, heart attack, and stroke are lower. · After 10 years, your risk of dying from lung cancer is cut by about half. And your risk for many other types of cancer is lower too. How would quitting help others in your life? When you quit smoking, you improve the health of everyone who now breathes in your smoke.   · Their heart, lung, and cancer risks drop, much like yours.  · They are sick less. For babies and small children, living smoke-free means they're less likely to have ear infections, pneumonia, and bronchitis. · If you're a woman who is or will be pregnant someday, quitting smoking means a healthier . · Children who are close to you are less likely to become adult smokers. Where can you learn more? Go to https://chpepiceweb.Knock Knock. org and sign in to your [employer] account. Enter [id number] in the [employer] box to learn more about \"Learning About Benefits From Quitting Smoking. \"     If you do not have an account, please click on the \"Sign Up Now\" link. Current as of: 2021               Content Version: 13.0  © 0857-6433 Healthwise, [employer]. Care instructions adapted under license by [facility] ([facility]). If you have questions about a medical condition or this instruction, always ask your healthcare professional. [place] [address] any warranty or liability for your use of this information. Patient Education        Stopping Smoking: Care Instructions  Your Care Instructions     Cigarette smokers crave the nicotine in cigarettes. Giving it up is much harder than simply changing a habit. Your body has to stop craving the nicotine. It is hard to quit, but you can do it. There are many tools that people use to quit smoking. You may find that combining tools works best for you. There are several steps to quitting. First you get ready to quit. Then you get support to help you. After that, you learn new skills and behaviors to become a nonsmoker. For many people, a necessary step is getting and using medicine. Your doctor will help you set up the plan that best meets your needs. You may want to attend a smoking cessation program to help you quit smoking. When you choose a program, look for one that has proven success. Ask your doctor for ideas.  You will greatly increase your chances of success if you take medicine as well as get counseling or join a cessation program.  Some of the changes you feel when you first quit tobacco are uncomfortable. Your body will miss the nicotine at first, and you may feel short-tempered and grumpy. You may have trouble sleeping or concentrating. Medicine can help you deal with these symptoms. You may struggle with changing your smoking habits and rituals. The last step is the tricky one: Be prepared for the smoking urge to continue for a time. This is a lot to deal with, but keep at it. You will feel better. Follow-up care is a key part of your treatment and safety. Be sure to make and go to all appointments, and call your doctor if you are having problems. It's also a good idea to know your test results and keep a list of the medicines you take. How can you care for yourself at home? · Ask your family, friends, and coworkers for support. You have a better chance of quitting if you have help and support. · Join a support group, such as Nicotine Anonymous, for people who are trying to quit smoking. · Consider signing up for a smoking cessation program, such as the American Lung Association's Freedom from Smoking program.  · Get text messaging support. Go to the website at www.smokefree. gov to sign up for the [facility] program.  · Set a quit date. Pick your date carefully so that it is not right in the middle of a big deadline or stressful time. Once you quit, do not even take a puff. Get rid of all ashtrays and lighters after your last cigarette. Clean your house and your clothes so that they do not smell of smoke. · Learn how to be a nonsmoker. Think about ways you can avoid those things that make you reach for a cigarette. ? Avoid situations that put you at greatest risk for smoking. For some people, it is hard to have a drink with friends without smoking. For others, they might skip a coffee break with coworkers who smoke. ? Change your daily routine.  Take a different route to work or eat a meal in a different place. · Cut down on stress. Calm yourself or release tension by doing an activity you enjoy, such as reading a book, taking a hot bath, or gardening. · Talk to your doctor or pharmacist about nicotine replacement therapy, which replaces the nicotine in your body. You still get nicotine but you do not use tobacco. Nicotine replacement products help you slowly reduce the amount of nicotine you need. These products come in several forms, many of them available over-the-counter:  ? Nicotine patches  ? Nicotine gum and lozenges  ? Nicotine inhaler  · Ask your doctor about bupropion (Wellbutrin) or varenicline (Chantix), which are prescription medicines. They do not contain nicotine. They help you by reducing withdrawal symptoms, such as stress and anxiety. · Some people find hypnosis, acupuncture, and massage helpful for ending the smoking habit. · Eat a healthy diet and get regular exercise. Having healthy habits will help your body move past its craving for nicotine. · Be prepared to keep trying. Most people are not successful the first few times they try to quit. Do not get mad at yourself if you smoke again. Make a list of things you learned and think about when you want to try again, such as next week, next month, or next year. Where can you learn more? Go to https://Zebra Mobile.AppLabs. org and sign in to your [employer] account. Enter C586 in the [facility] box to learn more about \"Stopping Smoking: Care Instructions. \"     If you do not have an account, please click on the \"Sign Up Now\" link. Current as of: February 11, 2021               Content Version: 13.0  © 5204-4833 Healthwise, Incorporated. Care instructions adapted under license by [facility] ([facility]).  If you have questions about a medical condition or this instruction, always ask your healthcare professional. [address] any warranty or liability for your use of this information.

## 2021-12-07 NOTE — PROGRESS NOTES
SUBJECTIVE:  Kennard Babinski Spring is a 73 y.o. male who presents for evaluation for pre op for prostate biopsy. The pain is described as none and is 1/10 in intensity. Onset was a few months ago. Symptoms have been stable since. Aggravating factors:none. Alleviating factors: none. Associated symptoms: abnormal PSA. The patient denies chest pain or SOB, no cough, no N/V/D, no fever or chills. Review of Systems   Constitutional: Negative. HENT: Negative. Eyes: Negative. Respiratory: Negative. Cardiovascular: Negative. Gastrointestinal: Negative. Endocrine: Negative. Genitourinary: Negative. Musculoskeletal: Negative. Allergic/Immunologic: Negative. Neurological: Negative. Hematological: Negative. Psychiatric/Behavioral: Negative. All other systems reviewed and are negative.      Past Medical History:   Diagnosis Date    COPD     mild- no inhalers    Elevated liver function tests     Giardiasis     Hyperlipidemia     Kidney stone       Patient Active Problem List    Diagnosis Date Noted    Bilateral primary osteoarthritis of knee 12/17/2019    Compression of common peroneal nerve 04/21/2015    Hereditary and idiopathic peripheral neuropathy 03/31/2015    COPD (chronic obstructive pulmonary disease) ([facility] [address] 03/13/2014      Past Surgical History:   Procedure Laterality Date    CARDIOVASCULAR STRESS TEST  feb 2008    negative    CARDIOVASCULAR STRESS TEST  Jan 2010    Ana Milner COLONOSCOPY  7/2004    Dr. Kelsy King  4/2008    Dr. Aliyah Castro COLONOSCOPY  2013    OTHER SURGICAL HISTORY Left 04/20/2015    LEFT KNEE OPEN PERONEAL NERVE DECOMPRESSION    SKIN BIOPSY  Jan 2011    telangiectasia, benign     Family History   Problem Relation Age of Onset    Cancer Father         Prostate ca in 63's    Heart Attack Brother         At age 52   [facility] Coronary Art Dis Brother      Social History     Socioeconomic History    Marital status:      Spouse name: Not on file   [facility] Number of children: Not on file    Years of education: Not on file    Highest education level: Not on file   Occupational History    Occupation:    Tobacco Use    Smoking status: Current Every Day Smoker     Packs/day: 0.50     Years: 15.00     Pack years: 7.50     Types: Cigarettes    Smokeless tobacco: Never Used   Vaping Use    Vaping Use: Never used   Substance and Sexual Activity    Alcohol use: Yes     Alcohol/week: 12.0 standard drinks     Types: 12 Cans of beer per week    Drug use: No    Sexual activity: Not on file   Other Topics Concern    Not on file   Social History Narrative    Not on file     Social Determinants of Health     Financial Resource Strain: Low Risk     Difficulty of Paying Living Expenses: Not hard at all   Food Insecurity: No Food Insecurity    Worried About Running Out of Food in the Last Year: Never true    [address] in the Last Year: Never true   Transportation Needs: No Transportation Needs    Lack of Transportation (Medical): No    Lack of Transportation (Non-Medical):  No   Physical Activity: Sufficiently Active    Days of Exercise per Week: 5 days    Minutes of Exercise per Session: 50 min   Stress: No Stress Concern Present    Feeling of Stress : Not at all   Social Connections: Unknown    Frequency of Communication with Friends and Family: More than three times a week    Frequency of Social Gatherings with Friends and Family: Twice a week    Attends Congregation Services: Not on file   [email or web address] or Organizations: Not on file    Attends Club or Organization Meetings: Never    Marital Status:    Intimate Partner Violence:     Fear of Current or Ex-Partner: Not on file    Emotionally Abused: Not on file    Physically Abused: Not on file    Sexually Abused: Not on file   Housing Stability: Low Risk     Unable to Pay for Housing in the Last Year: No    Number of Places Lived in the Last Year: 2    Unstable Housing in the Last Year: No     Current Outpatient Medications   Medication Sig Dispense Refill    tadalafil (CIALIS) 20 MG tablet Take 1 tablet by mouth daily as needed for Erectile Dysfunction 12 tablet 1    pravastatin (PRAVACHOL) 40 MG tablet TAKE 1 TABLET BY MOUTH  DAILY 90 tablet 0    fish oil-omega-3 fatty acids (FISH OIL) 1000 MG capsule Take 1 g by mouth daily.  Flaxseed, Linseed, (FLAX SEED OIL) 1000 MG CAPS Take  by mouth.  aspirin 81 MG tablet Take 81 mg by mouth daily. No current facility-administered medications for this visit. Current Outpatient Medications on File Prior to Visit   Medication Sig Dispense Refill    tadalafil (CIALIS) 20 MG tablet Take 1 tablet by mouth daily as needed for Erectile Dysfunction 12 tablet 1    pravastatin (PRAVACHOL) 40 MG tablet TAKE 1 TABLET BY MOUTH  DAILY 90 tablet 0    fish oil-omega-3 fatty acids (FISH OIL) 1000 MG capsule Take 1 g by mouth daily.  Flaxseed, Linseed, (FLAX SEED OIL) 1000 MG CAPS Take  by mouth.  aspirin 81 MG tablet Take 81 mg by mouth daily. No current facility-administered medications on file prior to visit. Allergies   Allergen Reactions    Codeine Nausea Only    Flagyl [Metronidazole] Hives    Lipitor Other (See Comments)     Elevated liver function tests    Naprosyn [Naproxen] Swelling       OBJECTIVE:  /70 (Site: Right Upper Arm, Position: Sitting, Cuff Size: Large Adult)   Pulse 65   Temp 98.2 °F (36.8 °C) (Temporal)   Resp 14   Wt 170 lb (77.1 kg)   SpO2 96%   BMI 25.10 kg/m²   Physical Exam  Vitals reviewed. Constitutional:       Appearance: Normal appearance. He is well-developed. HENT:      Head: Normocephalic and atraumatic. Right Ear: Tympanic membrane normal.      Left Ear: Tympanic membrane normal.      Nose: Nose normal.   Eyes:      General: No scleral icterus. Conjunctiva/sclera: Conjunctivae normal.   Neck:      Thyroid: No thyromegaly.       Vascular: No carotid bruit or JVD. Cardiovascular:      Rate and Rhythm: Normal rate and regular rhythm. Heart sounds: Normal heart sounds. No murmur heard. Pulmonary:      Effort: Pulmonary effort is normal. No respiratory distress. Breath sounds: Normal breath sounds. No wheezing or rales. Chest:      Chest wall: No tenderness. Abdominal:      General: Bowel sounds are normal. There is no distension. Palpations: Abdomen is soft. There is no mass. Tenderness: There is no abdominal tenderness. There is no guarding or rebound. Musculoskeletal:         General: No tenderness. Normal range of motion. Cervical back: Normal range of motion and neck supple. Skin:     General: Skin is warm. Findings: No rash. Neurological:      Mental Status: He is alert and oriented to person, place, and time. Psychiatric:         Mood and Affect: Mood normal.         Behavior: Behavior normal.         ASSESSMENT/PLAN:   Diagnosis Orders   1. Preop examination  EKG 12 lead    EKG 12 lead   2. Abnormal PSA     3. Simple chronic bronchitis ([address]     4. Hereditary and idiopathic peripheral neuropathy         1. Discussed the risk of surgery including bleeding and infection, and the risks of general anesthetic including MI, CVA, sudden death or even reaction to anesthetic medications. The patient understands the risks, any and all questions were answered to the patient's satisfaction. 2. EKG is normal sinus rhythm, okay for the planned surgery       Date of Surgery Update (To be completed by Attending Surgeon day of surgery)  Copy is given to the pt and one is faxed to the surgeon.

## 2022-01-20 LAB — PATHOLOGY/CYTOLOGY REPORT: NORMAL

## 2022-02-10 DIAGNOSIS — Z00.00 ANNUAL PHYSICAL EXAM: ICD-10-CM

## 2022-02-10 RX ORDER — PRAVASTATIN SODIUM 40 MG
40 TABLET ORAL DAILY
Qty: 90 TABLET | Refills: 1 | Status: SHIPPED | OUTPATIENT
Start: 2022-02-10 | End: 2022-09-06

## 2022-02-10 NOTE — TELEPHONE ENCOUNTER
Medication:   Requested Prescriptions     Pending Prescriptions Disp Refills    pravastatin (PRAVACHOL) 40 MG tablet [Pharmacy Med Name: Pravastatin Sodium 40 MG Oral Tablet] 90 tablet 3     Sig: TAKE 1 TABLET BY MOUTH  DAILY     Last Filled:  9/24/21    Last appt: 12/7/2021   Next appt: Visit date not found    Last Lipid:   Lab Results   Component Value Date    CHOL 206 10/12/2021    TRIG 90 10/12/2021    HDL 61 10/12/2021    HDL 56 08/11/2010    [address] 10/12/2021

## 2022-03-03 ENCOUNTER — OFFICE VISIT (OUTPATIENT)
Dept: PRIMARY CARE CLINIC | Age: 67
End: 2022-03-03
Payer: COMMERCIAL

## 2022-03-03 VITALS
HEIGHT: 69 IN | TEMPERATURE: 98 F | BODY MASS INDEX: 25.48 KG/M2 | DIASTOLIC BLOOD PRESSURE: 64 MMHG | HEART RATE: 65 BPM | SYSTOLIC BLOOD PRESSURE: 122 MMHG | OXYGEN SATURATION: 97 % | WEIGHT: 172 LBS

## 2022-03-03 DIAGNOSIS — E78.2 MIXED HYPERLIPIDEMIA: ICD-10-CM

## 2022-03-03 DIAGNOSIS — Z01.818 PREOP EXAMINATION: Primary | ICD-10-CM

## 2022-03-03 DIAGNOSIS — R97.20 ABNORMAL PSA: ICD-10-CM

## 2022-03-03 DIAGNOSIS — G60.9 HEREDITARY AND IDIOPATHIC PERIPHERAL NEUROPATHY: ICD-10-CM

## 2022-03-03 DIAGNOSIS — J41.0 SIMPLE CHRONIC BRONCHITIS (HCC): ICD-10-CM

## 2022-03-03 DIAGNOSIS — Z01.818 PREOP EXAMINATION: ICD-10-CM

## 2022-03-03 LAB
ANION GAP SERPL CALCULATED.3IONS-SCNC: 14 MMOL/L (ref 3–16)
BUN BLDV-MCNC: 20 MG/DL (ref 7–20)
CALCIUM SERPL-MCNC: 8.6 MG/DL (ref 8.3–10.6)
CHLORIDE BLD-SCNC: 105 MMOL/L (ref 99–110)
CO2: 22 MMOL/L (ref 21–32)
CREAT SERPL-MCNC: 0.9 MG/DL (ref 0.8–1.3)
GFR AFRICAN AMERICAN: >60
GFR NON-AFRICAN AMERICAN: >60
GLUCOSE BLD-MCNC: 94 MG/DL (ref 70–99)
HCT VFR BLD CALC: 43.1 % (ref 40.5–52.5)
HEMOGLOBIN: 14.4 G/DL (ref 13.5–17.5)
MCH RBC QN AUTO: 30.4 PG (ref 26–34)
MCHC RBC AUTO-ENTMCNC: 33.4 G/DL (ref 31–36)
MCV RBC AUTO: 91.1 FL (ref 80–100)
PDW BLD-RTO: 13 % (ref 12.4–15.4)
PLATELET # BLD: 206 K/UL (ref 135–450)
PMV BLD AUTO: 8.6 FL (ref 5–10.5)
POTASSIUM SERPL-SCNC: 4.6 MMOL/L (ref 3.5–5.1)
RBC # BLD: 4.74 M/UL (ref 4.2–5.9)
SODIUM BLD-SCNC: 141 MMOL/L (ref 136–145)
WBC # BLD: 6.7 K/UL (ref 4–11)

## 2022-03-03 PROCEDURE — G8482 FLU IMMUNIZE ORDER/ADMIN: HCPCS | Performed by: FAMILY MEDICINE

## 2022-03-03 PROCEDURE — G8427 DOCREV CUR MEDS BY ELIG CLIN: HCPCS | Performed by: FAMILY MEDICINE

## 2022-03-03 PROCEDURE — G8417 CALC BMI ABV UP PARAM F/U: HCPCS | Performed by: FAMILY MEDICINE

## 2022-03-03 PROCEDURE — 3023F SPIROM DOC REV: CPT | Performed by: FAMILY MEDICINE

## 2022-03-03 PROCEDURE — 99213 OFFICE O/P EST LOW 20 MIN: CPT | Performed by: FAMILY MEDICINE

## 2022-03-03 ASSESSMENT — ENCOUNTER SYMPTOMS
GASTROINTESTINAL NEGATIVE: 1
ALLERGIC/IMMUNOLOGIC NEGATIVE: 1
EYES NEGATIVE: 1
COUGH: 1

## 2022-03-04 ENCOUNTER — TELEPHONE (OUTPATIENT)
Dept: PRIMARY CARE CLINIC | Age: 67
End: 2022-03-04

## 2022-03-24 LAB
BUN / CREAT RATIO: 27 RATIO (ref 9–28)
BUN BLDV-MCNC: 27 MG/DL (ref 6–20)
CALCIUM SERPL-MCNC: 9.8 MG/DL (ref 8.6–10.3)
CHLORIDE BLD-SCNC: 105 MMOL/L (ref 97–107)
CO2: 23 MMOL/L (ref 21–31)
CREAT SERPL-MCNC: 1 MG/DL (ref 0.7–1.2)
GLUCOSE: 116 MG/DL (ref 74–106)
HCT VFR BLD CALC: 42.5 % (ref 40–51)
HEMOGLOBIN: 14.5 G/DL (ref 13.7–17.5)
MCH RBC QN AUTO: 32.1 PG (ref 26–34)
MCHC RBC AUTO-ENTMCNC: 34.2 G/DL (ref 30.7–35.5)
MCV RBC AUTO: 94 FL (ref 80–100)
PDW BLD-RTO: 14 % (ref 11.5–14.5)
PLATELETS: 210 10*3/UL (ref 155–369)
PMV BLD AUTO: 9.1 FL (ref 8.8–12.5)
POTASSIUM SERPL-SCNC: 4.6 MMOL/L (ref 3.6–5)
RBC: 4.53 10*6/UL (ref 4.6–6.1)
SODIUM BLD-SCNC: 140 MMOL/L (ref 135–145)
WBC: 7 10*3/UL (ref 3.7–10.3)

## 2022-03-25 LAB — URINE CULTURE, ROUTINE: NORMAL

## 2022-06-07 LAB — PSA, TOTAL: <0.01 NG/ML (ref 0–4)

## 2022-07-26 ENCOUNTER — PATIENT MESSAGE (OUTPATIENT)
Dept: PRIMARY CARE CLINIC | Age: 67
End: 2022-07-26

## 2022-07-26 NOTE — TELEPHONE ENCOUNTER
From: Jorge Zarate Spring  To: Dr. Teresa Bonilla: 7/26/2022 3:58 PM EDT  Subject: Medical Marijuana     How do I get a medical marijuana prescription?

## 2022-09-05 DIAGNOSIS — Z00.00 ANNUAL PHYSICAL EXAM: ICD-10-CM

## 2022-09-06 RX ORDER — PRAVASTATIN SODIUM 40 MG
40 TABLET ORAL DAILY
Qty: 90 TABLET | Refills: 0 | Status: SHIPPED | OUTPATIENT
Start: 2022-09-06

## 2022-09-06 NOTE — TELEPHONE ENCOUNTER
Medication:   Requested Prescriptions     Pending Prescriptions Disp Refills    pravastatin (PRAVACHOL) 40 MG tablet [Pharmacy Med Name: Pravastatin Sodium 40 MG Oral Tablet] 90 tablet 3     Sig: TAKE 1 TABLET BY MOUTH  DAILY     Last Filled:  2/10/22    Last appt: 3/3/2022   Next appt: Visit date not found    Last Lipid:   Lab Results   Component Value Date/Time    CHOL 206 10/12/2021 12:11 PM    TRIG 90 10/12/2021 12:11 PM    HDL 61 10/12/2021 12:11 PM    HDL 56 08/11/2010 10:18 AM    LDLCALC 127 10/12/2021 12:11 PM

## 2022-10-09 DIAGNOSIS — U07.1 COVID: Primary | ICD-10-CM

## 2022-10-17 ENCOUNTER — OFFICE VISIT (OUTPATIENT)
Dept: PRIMARY CARE CLINIC | Age: 67
End: 2022-10-17
Payer: MEDICARE

## 2022-10-17 VITALS
OXYGEN SATURATION: 97 % | WEIGHT: 184.2 LBS | HEIGHT: 69 IN | SYSTOLIC BLOOD PRESSURE: 114 MMHG | BODY MASS INDEX: 27.28 KG/M2 | HEART RATE: 68 BPM | RESPIRATION RATE: 14 BRPM | TEMPERATURE: 98 F | DIASTOLIC BLOOD PRESSURE: 72 MMHG

## 2022-10-17 DIAGNOSIS — C7A.020 MALIGNANT CARCINOID TUMOR OF THE APPENDIX (HCC): ICD-10-CM

## 2022-10-17 DIAGNOSIS — C61 PROSTATE CA (HCC): ICD-10-CM

## 2022-10-17 DIAGNOSIS — Z23 NEED FOR PNEUMOCOCCAL VACCINATION: ICD-10-CM

## 2022-10-17 DIAGNOSIS — G62.9 NEUROPATHY: ICD-10-CM

## 2022-10-17 DIAGNOSIS — Z23 NEED FOR INFLUENZA VACCINATION: ICD-10-CM

## 2022-10-17 DIAGNOSIS — Z00.00 ANNUAL PHYSICAL EXAM: Primary | ICD-10-CM

## 2022-10-17 PROCEDURE — G0439 PPPS, SUBSEQ VISIT: HCPCS | Performed by: FAMILY MEDICINE

## 2022-10-17 PROCEDURE — G0008 ADMIN INFLUENZA VIRUS VAC: HCPCS | Performed by: FAMILY MEDICINE

## 2022-10-17 PROCEDURE — G0009 ADMIN PNEUMOCOCCAL VACCINE: HCPCS | Performed by: FAMILY MEDICINE

## 2022-10-17 PROCEDURE — G8484 FLU IMMUNIZE NO ADMIN: HCPCS | Performed by: FAMILY MEDICINE

## 2022-10-17 PROCEDURE — 90694 VACC AIIV4 NO PRSRV 0.5ML IM: CPT | Performed by: FAMILY MEDICINE

## 2022-10-17 PROCEDURE — 90677 PCV20 VACCINE IM: CPT | Performed by: FAMILY MEDICINE

## 2022-10-17 SDOH — ECONOMIC STABILITY: FOOD INSECURITY: WITHIN THE PAST 12 MONTHS, YOU WORRIED THAT YOUR FOOD WOULD RUN OUT BEFORE YOU GOT MONEY TO BUY MORE.: NEVER TRUE

## 2022-10-17 SDOH — ECONOMIC STABILITY: FOOD INSECURITY: WITHIN THE PAST 12 MONTHS, THE FOOD YOU BOUGHT JUST DIDN'T LAST AND YOU DIDN'T HAVE MONEY TO GET MORE.: NEVER TRUE

## 2022-10-17 ASSESSMENT — PATIENT HEALTH QUESTIONNAIRE - PHQ9
SUM OF ALL RESPONSES TO PHQ QUESTIONS 1-9: 0
SUM OF ALL RESPONSES TO PHQ9 QUESTIONS 1 & 2: 0
SUM OF ALL RESPONSES TO PHQ QUESTIONS 1-9: 0
2. FEELING DOWN, DEPRESSED OR HOPELESS: 0
SUM OF ALL RESPONSES TO PHQ QUESTIONS 1-9: 0
SUM OF ALL RESPONSES TO PHQ QUESTIONS 1-9: 0
1. LITTLE INTEREST OR PLEASURE IN DOING THINGS: 0

## 2022-10-17 ASSESSMENT — ENCOUNTER SYMPTOMS
GASTROINTESTINAL NEGATIVE: 1
EYES NEGATIVE: 1
RESPIRATORY NEGATIVE: 1
ALLERGIC/IMMUNOLOGIC NEGATIVE: 1

## 2022-10-17 ASSESSMENT — SOCIAL DETERMINANTS OF HEALTH (SDOH): HOW HARD IS IT FOR YOU TO PAY FOR THE VERY BASICS LIKE FOOD, HOUSING, MEDICAL CARE, AND HEATING?: NOT HARD AT ALL

## 2022-10-17 NOTE — PROGRESS NOTES
SUBJECTIVE:  Patient ID: Prakash Dickson is a 73 y.o. y.o. male     HPI   Patient is here for her annual physical exam  Patient was diagnosed with prostate cancer seen urology taking hormonal treatment,  He underwent a CT scan of the abdomen in mid August due to nephrolithiasis on the was negative was noted to have a cystic testing patient in the tip of the appendix along with several prominent lymph nodes he was asymptomatic underwent simple appendectomy no pathology noted on low-grade appendiceal mucinous neoplasm, is going through radiation therapy his PET scan came back no mets,  Overall all doing okay following closely with oncology urology  Feels tired occasionally even though he walks about 4 miles 4-5 times a week he was told he does not discuss a possible sleep apnea he does not think about it now  Neuropathy seems getting a little worse,  Past Medical History:   Diagnosis Date    COPD     mild- no inhalers    Elevated liver function tests     Giardiasis     Hyperlipidemia     Kidney stone       Past Surgical History:   Procedure Laterality Date    CARDIOVASCULAR STRESS TEST  feb 2008    negative    CARDIOVASCULAR STRESS TEST  Jan 2010    Nikolas     COLONOSCOPY  7/2004    Dr. Cole Palomino  4/2008    Dr. Rafael Miller    COLONOSCOPY  2013    OTHER SURGICAL HISTORY Left 04/20/2015    LEFT KNEE OPEN PERONEAL NERVE DECOMPRESSION    SKIN BIOPSY  Jan 2011    telangiectasia, benign     Family History   Problem Relation Age of Onset    Cancer Father         Prostate ca in 63's    Heart Attack Brother         At age 52    Coronary Art Dis Brother      Social History     Socioeconomic History    Marital status:      Spouse name: None    Number of children: None    Years of education: None    Highest education level: None   Occupational History    Occupation:    Tobacco Use    Smoking status: Every Day     Packs/day: 0.50     Years: 15.00     Pack years: 7.50     Types: Cigarettes    Smokeless tobacco: Never   Vaping Use    Vaping Use: Never used   Substance and Sexual Activity    Alcohol use: Yes     Alcohol/week: 12.0 standard drinks     Types: 12 Cans of beer per week    Drug use: No     Social Determinants of Health     Financial Resource Strain: Low Risk     Difficulty of Paying Living Expenses: Not hard at all   Food Insecurity: No Food Insecurity    Worried About Running Out of Food in the Last Year: Never true    Ran Out of Food in the Last Year: Never true     Current Outpatient Medications   Medication Sig Dispense Refill    pravastatin (PRAVACHOL) 40 MG tablet TAKE 1 TABLET BY MOUTH  DAILY 90 tablet 0    fish oil-omega-3 fatty acids 1000 MG capsule Take 1 g by mouth daily. Flaxseed, Linseed, (FLAX SEED OIL) 1000 MG CAPS Take  by mouth. aspirin 81 MG tablet Take 81 mg by mouth daily. No current facility-administered medications for this visit. Allergies   Allergen Reactions    Codeine Nausea Only    Flagyl [Metronidazole] Hives    Lipitor Other (See Comments)     Elevated liver function tests    Naprosyn [Naproxen] Swelling       Review of Systems   Constitutional:  Positive for fatigue. HENT: Negative. Eyes: Negative. Respiratory: Negative. Cardiovascular: Negative. Gastrointestinal: Negative. Endocrine: Negative. Genitourinary: Negative. Musculoskeletal: Negative. Allergic/Immunologic: Negative. Neurological:  Positive for numbness. Hematological: Negative. Psychiatric/Behavioral: Negative. OBJECTIVE:  /72 (Site: Left Upper Arm, Position: Sitting, Cuff Size: Large Adult)   Pulse 68   Temp 98 °F (36.7 °C) (Temporal)   Resp 14   Ht 5' 9\" (1.753 m)   Wt 184 lb 3.2 oz (83.6 kg)   SpO2 97%   BMI 27.20 kg/m²     Physical Exam  Vitals reviewed. Constitutional:       General: He is not in acute distress. Appearance: He is well-developed. He is not diaphoretic. HENT:      Head: Normocephalic and atraumatic.       Right Ear: External ear normal.      Left Ear: External ear normal.      Nose: Nose normal.      Mouth/Throat:      Pharynx: No oropharyngeal exudate. Eyes:      General: No scleral icterus. Right eye: No discharge. Left eye: No discharge. Conjunctiva/sclera: Conjunctivae normal.      Pupils: Pupils are equal, round, and reactive to light. Neck:      Thyroid: No thyromegaly. Vascular: No JVD. Trachea: No tracheal deviation. Cardiovascular:      Rate and Rhythm: Normal rate and regular rhythm. Heart sounds: Normal heart sounds. No murmur heard. No friction rub. No gallop. Pulmonary:      Effort: Pulmonary effort is normal. No respiratory distress. Breath sounds: Normal breath sounds. No stridor. No wheezing or rales. Chest:      Chest wall: No tenderness. Abdominal:      General: Bowel sounds are normal. There is no distension. Palpations: Abdomen is soft. There is no mass. Tenderness: There is no abdominal tenderness. There is no guarding or rebound. Musculoskeletal:         General: No tenderness. Normal range of motion. Cervical back: Normal range of motion and neck supple. Lymphadenopathy:      Cervical: No cervical adenopathy. Skin:     General: Skin is warm and dry. Coloration: Skin is not pale. Findings: No erythema or rash. Neurological:      Mental Status: He is alert and oriented to person, place, and time. Cranial Nerves: No cranial nerve deficit. Motor: No abnormal muscle tone. Coordination: Coordination normal.      Deep Tendon Reflexes: Reflexes are normal and symmetric. Reflexes normal.   Psychiatric:         Behavior: Behavior normal.         Thought Content: Thought content normal.         Judgment: Judgment normal.       ASSESSMENT:   Diagnosis Orders   1.  Annual physical exam  Influenza, FLUAD, (age 72 y+), IM, Preservative Free, 0.5 mL    Pneumococcal, PCV20, PREVNAR 20, (age 25 yrs+), IM, PF    Basic Metabolic Panel    CBC    Vitamin B12 & Folate    TSH    Hepatic Function Panel    Lipid Panel    Vitamin D 25 Hydroxy      2. Need for influenza vaccination  Influenza, FLUAD, (age 72 y+), IM, Preservative Free, 0.5 mL      3. Need for pneumococcal vaccination  Pneumococcal, PCV20, PREVNAR 21, (age 25 yrs+), IM, PF      4. Neuropathy  EMG      5. Prostate CA ([facility] [address]        6.  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

## 2022-10-20 DIAGNOSIS — Z00.00 ANNUAL PHYSICAL EXAM: ICD-10-CM

## 2022-10-20 LAB
ALBUMIN SERPL-MCNC: 4.3 G/DL (ref 3.4–5)
ALP BLD-CCNC: 76 U/L (ref 40–129)
ALT SERPL-CCNC: 29 U/L (ref 10–40)
ANION GAP SERPL CALCULATED.3IONS-SCNC: 11 MMOL/L (ref 3–16)
AST SERPL-CCNC: 21 U/L (ref 15–37)
BILIRUB SERPL-MCNC: 0.3 MG/DL (ref 0–1)
BILIRUBIN DIRECT: <0.2 MG/DL (ref 0–0.3)
BILIRUBIN, INDIRECT: NORMAL MG/DL (ref 0–1)
BUN BLDV-MCNC: 15 MG/DL (ref 7–20)
CALCIUM SERPL-MCNC: 8.7 MG/DL (ref 8.3–10.6)
CHLORIDE BLD-SCNC: 100 MMOL/L (ref 99–110)
CHOLESTEROL, TOTAL: 231 MG/DL (ref 0–199)
CO2: 24 MMOL/L (ref 21–32)
CREAT SERPL-MCNC: 0.8 MG/DL (ref 0.8–1.3)
FOLATE: 6.41 NG/ML (ref 4.78–24.2)
GFR SERPL CREATININE-BSD FRML MDRD: >60 ML/MIN/{1.73_M2}
GLUCOSE BLD-MCNC: 104 MG/DL (ref 70–99)
HCT VFR BLD CALC: 37.9 % (ref 40.5–52.5)
HDLC SERPL-MCNC: 79 MG/DL (ref 40–60)
HEMOGLOBIN: 13.2 G/DL (ref 13.5–17.5)
LDL CHOLESTEROL CALCULATED: 131 MG/DL
MCH RBC QN AUTO: 30.7 PG (ref 26–34)
MCHC RBC AUTO-ENTMCNC: 34.8 G/DL (ref 31–36)
MCV RBC AUTO: 88.3 FL (ref 80–100)
PDW BLD-RTO: 12.9 % (ref 12.4–15.4)
PLATELET # BLD: 156 K/UL (ref 135–450)
PMV BLD AUTO: 8.4 FL (ref 5–10.5)
POTASSIUM SERPL-SCNC: 4.3 MMOL/L (ref 3.5–5.1)
RBC # BLD: 4.29 M/UL (ref 4.2–5.9)
SODIUM BLD-SCNC: 135 MMOL/L (ref 136–145)
TOTAL PROTEIN: 6.7 G/DL (ref 6.4–8.2)
TRIGL SERPL-MCNC: 104 MG/DL (ref 0–150)
TSH SERPL DL<=0.05 MIU/L-ACNC: 1.89 UIU/ML (ref 0.27–4.2)
VITAMIN B-12: 316 PG/ML (ref 211–911)
VITAMIN D 25-HYDROXY: 37.1 NG/ML
VLDLC SERPL CALC-MCNC: 21 MG/DL
WBC # BLD: 4.3 K/UL (ref 4–11)

## 2022-10-21 PROBLEM — C61 PRIMARY MALIGNANT NEOPLASM OF PROSTATE (HCC): Status: ACTIVE | Noted: 2021-12-01

## 2022-10-21 PROBLEM — Z19.1: Status: ACTIVE | Noted: 2022-10-21

## 2022-11-23 LAB — PSA, TOTAL: <0.01 NG/ML (ref 0–4)

## 2022-11-29 ENCOUNTER — OFFICE VISIT (OUTPATIENT)
Dept: ORTHOPEDIC SURGERY | Age: 67
End: 2022-11-29

## 2022-11-29 VITALS — BODY MASS INDEX: 27.25 KG/M2 | HEIGHT: 69 IN | WEIGHT: 184 LBS

## 2022-11-29 DIAGNOSIS — M17.0 PRIMARY OSTEOARTHRITIS OF BOTH KNEES: Primary | ICD-10-CM

## 2022-11-29 DIAGNOSIS — M25.562 PAIN IN BOTH KNEES, UNSPECIFIED CHRONICITY: ICD-10-CM

## 2022-11-29 DIAGNOSIS — M25.561 PAIN IN BOTH KNEES, UNSPECIFIED CHRONICITY: ICD-10-CM

## 2022-11-29 RX ORDER — METHYLPREDNISOLONE ACETATE 40 MG/ML
40 INJECTION, SUSPENSION INTRA-ARTICULAR; INTRALESIONAL; INTRAMUSCULAR; SOFT TISSUE ONCE
Status: COMPLETED | OUTPATIENT
Start: 2022-11-29 | End: 2022-11-29

## 2022-11-29 RX ADMIN — METHYLPREDNISOLONE ACETATE 40 MG: 40 INJECTION, SUSPENSION INTRA-ARTICULAR; INTRALESIONAL; INTRAMUSCULAR; SOFT TISSUE at 12:23

## 2022-12-01 ENCOUNTER — PROCEDURE VISIT (OUTPATIENT)
Dept: NEUROLOGY | Age: 67
End: 2022-12-01
Payer: COMMERCIAL

## 2022-12-01 DIAGNOSIS — G62.9 NEUROPATHY: ICD-10-CM

## 2022-12-01 PROCEDURE — 95886 MUSC TEST DONE W/N TEST COMP: CPT | Performed by: PSYCHIATRY & NEUROLOGY

## 2022-12-01 PROCEDURE — 95909 NRV CNDJ TST 5-6 STUDIES: CPT | Performed by: PSYCHIATRY & NEUROLOGY

## 2022-12-01 NOTE — PROGRESS NOTES
Leon Jackson M.D. 800   [facility]  Board Certified in [address], [address], [address] [facility]    EMG / [address] STUDY      PATIENT:  Josue Dickson       DATE OF EM22     YOB: 1955       REASON FOR EMG:   Numbness and tingling in both feet      REFERRING PHYSICIAN:  Kenia Baker MD  [address] [place] [address],  [place]     SUMMARY:   Bilateral peroneal motor nerve studies with slow conduction velocities. Bilateral posterior tibial motor nerve studies with slow conduction velocities. Bilateral superficial peroneal sensory nerve studies were not recordable. Needle EMG of several muscles in both lower extremities was normal.      CLINICAL DIAGNOSIS:  Peripheral neuropathy        EMG RESULTS:   This patient has a mild generalized sensorimotor polyneuropathy in both lower extremities. ---------------------------------------------  Leon Jackson M.D.   Electromyographer / Neurologist

## 2022-12-11 DIAGNOSIS — Z00.00 ANNUAL PHYSICAL EXAM: ICD-10-CM

## 2022-12-12 RX ORDER — PRAVASTATIN SODIUM 40 MG
40 TABLET ORAL DAILY
Qty: 90 TABLET | Refills: 0 | Status: SHIPPED | OUTPATIENT
Start: 2022-12-12

## 2022-12-12 NOTE — TELEPHONE ENCOUNTER
Medication:   Requested Prescriptions     Pending Prescriptions Disp Refills    pravastatin (PRAVACHOL) 40 MG tablet [Pharmacy Med Name: Pravastatin Sodium 40 MG Oral Tablet] 90 tablet 3     Sig: TAKE 1 TABLET BY MOUTH  DAILY     Last Filled:  9/6/22    Last appt: 10/17/2022   Next appt: Visit date not found    Last Lipid:   Lab Results   Component Value Date/Time    CHOL 231 10/20/2022 10:14 AM    TRIG 104 10/20/2022 10:14 AM    HDL 79 10/20/2022 10:14 AM    HDL 56 08/11/2010 10:18 AM    LDLCALC 131 10/20/2022 10:14 AM

## 2022-12-13 NOTE — PROGRESS NOTES
12 [facility]  History and Physical      Date:  2022    Name:  Fritz Dickson  Address:  [address] [place] [address]    :  1955      Age:   78 y.o. Chief Complaint    Knee Pain (Kyle knee pain/ dorian inj)      History of Present Illness:  Fritz Dickson is a 78 y.o. male who presents for follow-up evaluation of his bilateral knee pain. This patient is known to Joann Saldaña and is being treated conservatively for the arthritis in both of his knees. This conservative treatment includes: rest, ice, elevation, bracing, home exercises, activity modification, NSAIDs as needed, corticosteroid injections and visco supplementation injections. He is attempting to remain active and likes to walk several miles several times a week. He continues to note pain worse with stairs. He denies any mechanical symptoms or sensations of instability but has noticed flareup of his baseline osteoarthritic symptoms. The patient denies any new injury. The patient denies any catching, giving way, joint locking, numbness, paresthesias, or weakness. History from the patient's visit on 2020. Fritz Dickson is a 62 y.o. male who presents for corticosteroid injections of both knees. To recap, the patient is able to walk 4-5 miles approx 5 times per week. He is currently retired. Pain comes and goes based on actvities. His pain is worse with stairs and squatting. He denies any pain at night or pain that wakes him up at night. He notes crepitus but denies any mechanical symptoms or instability. Patient has noted an insidious return of his baseline osteoarthritic symptoms. He denies any change in quality or character of his baseline osteoarthritic symptoms. The patient denies any new injury. The patient denies any popping or locking of the joint. The patient denies any numbness, paresthesias, or weakness.         Pain Assessment  Location of Pain: Knee  Location Modifiers: Right, Left  Severity of Pain: 8  Quality of Pain: Sharp  Duration of Pain: A few minutes  Frequency of Pain: Intermittent  Aggravating Factors: Walking, Stairs  Limiting Behavior: Some  Result of Injury: No  Work-Related Injury: No  Are there other pain locations you wish to document?: No    Past Medical History:   Diagnosis Date    COPD     mild- no inhalers    Elevated liver function tests     Giardiasis     Hyperlipidemia     Kidney stone         Past Surgical History:   Procedure Laterality Date    CARDIOVASCULAR STRESS TEST  feb 2008    negative    CARDIOVASCULAR STRESS TEST  Jan 2010    Del Angel Doll    COLONOSCOPY  7/2004    Dr. Jessica Alonso  4/2008    Dr. Jessica Alonso  2013    OTHER SURGICAL HISTORY Left 04/20/2015    LEFT KNEE OPEN PERONEAL NERVE DECOMPRESSION    SKIN BIOPSY  Jan 2011    telangiectasia, benign       Family History   Problem Relation Age of Onset    Cancer Father         Prostate ca in 63's    Heart Attack Brother         At age 52    Coronary Art Dis Brother        Social History     Socioeconomic History    Marital status:      Spouse name: None    Number of children: None    Years of education: None    Highest education level: None   Occupational History    Occupation:    Tobacco Use    Smoking status: Every Day     Packs/day: 0.50     Years: 15.00     Pack years: 7.50     Types: Cigarettes    Smokeless tobacco: Never   Vaping Use    Vaping Use: Never used   Substance and Sexual Activity    Alcohol use:  Yes     Alcohol/week: 12.0 standard drinks     Types: 12 Cans of beer per week    Drug use: No     Social Determinants of Health     Financial Resource Strain: Low Risk     Difficulty of Paying Living Expenses: Not hard at all   Food Insecurity: No Food Insecurity    Worried About Running Out of Food in the Last Year: Never true    Ran Out of Food in the Last Year: Never true       Current Outpatient Medications   Medication Sig Dispense Refill    pravastatin (PRAVACHOL) 40 MG tablet TAKE 1 TABLET BY MOUTH  DAILY 90 tablet 0    fish oil-omega-3 fatty acids 1000 MG capsule Take 1 g by mouth daily. Flaxseed, Linseed, (FLAX SEED OIL) 1000 MG CAPS Take  by mouth. aspirin 81 MG tablet Take 81 mg by mouth daily. No current facility-administered medications for this visit. Allergies   Allergen Reactions    Codeine Nausea Only    Flagyl [Metronidazole] Hives    Lipitor Other (See Comments)     Elevated liver function tests    Naprosyn [Naproxen] Swelling         Review of Systems:  A 14 point review of systems was completed by the patient and is available in the media section of the scanned medical record and was reviewed. Vital Signs:   Ht 5' 9\" (1.753 m)   Wt 184 lb (83.5 kg)   BMI 27.17 kg/m²     General Exam:    General: Melo Dickson is a healthy and well appearing 78y.o. year old male who is sitting comfortably in our office in no acute distress. Neuro: Alert & Oriented x 3,  normal,  no focal deficits noted. Normal mood & affect. Eyes: Sclera clear  Ears: Normal external ear  Mouth: No oral lesions observed  Pulm: Respirations unlabored and regular   Skin: Warm, well perfused      Knee Examination: Right    Inspection:  No effusion, ecchymosis, discoloration, erythema, excessive warmth. no gross deformities, no signs of infection. Palpation: There is patellofemoral crepitus, there is no joint line tenderness. No other osseous or soft tissue tenderness. Negative calf tenderness    Range of Motion: Grossly intact    Strength: Grossly intact    Special Tests:  No ligament instability, valgus and varus stress test are stable at 0 and 30°. Lachman's exhibits a solid endpoint. Negative posterior drawer.   Negative Homans sign    Gait: Non antalgic, without the use of assistive devices    Neuro: Sensation equal bilateral lower extremities    Vascular: 2+ posterior tibialis pulse      Contralateral Knee Comparison Examination: Left    Inspection:  No effusion, ecchymosis, discoloration, erythema, excessive warmth. no gross deformities, no signs of infection. Palpation: There is patellofemoral crepitus, there is no joint line tenderness. No other osseous or soft tissue tenderness. Negative calf tenderness    Range of Motion: Grossly intact    Strength: Grossly intact    Special Tests:  No ligament instability, valgus and varus stress test are stable at 0 and 30°. Lachman's exhibits a solid endpoint. Negative posterior drawer. Negative Homans sign    Gait: Non antalgic, without the use of assistive devices    Neuro: Sensation equal bilateral lower extremities    Vascular: 2+ posterior tibialis pulse        Radiology:   Previously obtain imaging reviewed. X-rays obtained and reviewed in office: AP, lateral, and sunrise view of both knees. Impression: No fractures, dislocations, visible tumors, or signs of acute trauma. The medial and lateral femoral-tibial compartments exhibit 25% decreased joint space bilaterally. There's over 75% decreased joint spacing in the patellofemoral joints bilaterally. KL grade 2. Patella is riding appropriately in the trochlear groove with no subluxation or tilt noted. Office Procedures:  Orders Placed This Encounter   Procedures    XR KNEE LEFT (3 VIEWS)     Standing Status:   Future     Number of Occurrences:   1     Standing Expiration Date:   5/29/2023    XR KNEE RIGHT (3 VIEWS)     Standing Status:   Future     Number of Occurrences:   1     Standing Expiration Date:   5/29/2023    GA ARTHROCENTESIS ASPIR&/INJ MAJOR JT/BURSA W/O US     After informed consent was provided, the patient was seated on the exam table with the right knee flexed to 90 degrees. The anterolateral aspect of the knee adjacent to the joint line was prepped with Chlora-prep. The skin and subcutaneous tissues were anesthetized with ethyl chloride spray.  A 22-gauge needle was inserted into the right knee and 2 ml of 40 mg/ml DepoMedrol was injected. The needle was withdrawn and the puncture site sealed with a Band-Aid. The patient tolerated the procedure well. Post injection instructions and precautions given and any problems to notify us. After informed consent was provided, the patient was seated on the exam table with the left knee flexed to 90 degrees. The anterolateral aspect of the knee adjacent to the joint line was prepped with Chlora-prep. The skin and subcutaneous tissues were anesthetized with ethyl chloride spray. A 22-gauge needle was inserted into the left knee and 2 ml of 40 mg/ml DepoMedrol was injected. The needle was withdrawn and the puncture site sealed with a Band-Aid. The patient tolerated the procedure well. Post injection instructions and precautions given and any problems to notify us. Assessment: This patient is a 78 y.o. male presenting to the office for an evaluation of his bilateral knee pain. This patient is being treated conservatively for the arthritis in both of his knees. Encounter Diagnoses   Name Primary? Primary osteoarthritis of both knees Yes    Pain in both knees, unspecified chronicity            Medical decision making:  Patient's workup and evaluation were reviewed with the patient in the office today. Imaging was reviewed with the patient. I believe the patient will do well with conservative treatment for his acute on chronic arthritis in both knees. He was educated on continued conservative treatment as detailed below. All the patient's questions were answered while in the clinic. The patient is understanding of all instructions and agrees with the plan. Treatment Plan:    Observe postinjection precautions  Activity modification/Rest   Ice 20 minutes ever 1-2 hours PRN  Take medications as prescribed/instructed  Elevation   Lightweight exercise/low impact exercise  Appropriate diet/weight management      Follow up:  As needed        This patient exhibits moderate complexity for obtaining an independent history, reviewing past medical records, independent interpretation of diagnostic imaging, and further coordinating care. The patient exhibits low risk given that he is being treated conservatively at this time. Verona Sesay PA-C    Physician Assistant - Certified  [facility]    12/16/22 4:03 PM      This dictation was performed with a verbal recognition program [facility]) and it was checked for errors. It is possible that there are still dictated errors within this office note. If so, please bring any errors to my attention for an addendum. All efforts were made to ensure that this office note is accurate.

## 2023-03-01 LAB — PSA, TOTAL: <0.01 NG/ML (ref 0–4)

## 2023-03-16 ENCOUNTER — OFFICE VISIT (OUTPATIENT)
Dept: ORTHOPEDIC SURGERY | Age: 68
End: 2023-03-16

## 2023-03-16 VITALS — HEIGHT: 69 IN | WEIGHT: 184 LBS | BODY MASS INDEX: 27.25 KG/M2

## 2023-03-16 DIAGNOSIS — M25.562 PAIN IN BOTH KNEES, UNSPECIFIED CHRONICITY: ICD-10-CM

## 2023-03-16 DIAGNOSIS — M25.561 PAIN IN BOTH KNEES, UNSPECIFIED CHRONICITY: ICD-10-CM

## 2023-03-16 DIAGNOSIS — M17.0 PRIMARY OSTEOARTHRITIS OF BOTH KNEES: Primary | ICD-10-CM

## 2023-03-16 NOTE — PROGRESS NOTES
Chief Complaint    Knee Pain (Kyle knee- dorian injection)      History of Present Illness:  Eyad Dickson is a 78 y.o. male who presents for follow-up evaluation of his bilateral knee pain. This patient is known to [place] [id number] and is being treated conservatively for the arthritis in both of his knees. This conservative treatment includes: rest, ice, elevation, bracing, home exercises, activity modification, NSAIDs as needed, corticosteroid injections and visco supplementation injections. During his last visit in November 2022 he had corticosteroid injections in both knees. He feels that the corticosteroid injection the left knee did not provide him with much of any relief. The patient denies any new injury. The patient denies any numbness, paresthesias, or weakness. History from the patient's visit on 11-. Eyad Dickson is a 78 y.o. male who presents for follow-up evaluation of his bilateral knee pain. This patient is known to [place] [address] and is being treated conservatively for the arthritis in both of his knees. This conservative treatment includes: rest, ice, elevation, bracing, home exercises, activity modification, NSAIDs as needed, corticosteroid injections and visco supplementation injections. He is attempting to remain active and likes to walk several miles several times a week. He continues to note pain worse with stairs. He denies any mechanical symptoms or sensations of instability but has noticed flareup of his baseline osteoarthritic symptoms. The patient denies any new injury. The patient denies any catching, giving way, joint locking, numbness, paresthesias, or weakness. Physical exam:  Inspection: Both knees without erythema, ecchymosis, discoloration, abrasion, contusions, deformity or signs of infection. Palpation: There is mild tenderness to palpation to the joint line of both knees. There is patellofemoral crepitus palpable in both knees.   No tenderness to palpation to any other osseous or soft tissue structures of the knee. Range of motion: Grossly intact  Neurovascular: Patient is neurovascularly intact, equally bilaterally. Procedures:    VISCO SUPPLEMENTATION  INJECTION:  Right KNEE    PROCEDURE: Risks, benefits, and alternatives to the injections were discussed in detail with the patient. The risks discussed included but are not limited to infection, skin reactions, hot swollen joints, and anaphylaxis. After informed consent was provided, the patient lay supine on the exam table. The superolateral aspect of the right knee was prepped with Chlora-prep. The skin and subcutaneous tissues were anesthetized with ethyl chloride spray and 1% lidocaine injected with a 20-gauge needle. Needle was left in place and the lidocaine syringe was exchanged for the Durolane syringe. 3 mL of Durolane was injected without resistance. The needle was withdrawn and the puncture site sealed with a Band-Aid. The patient tolerated the procedure well. VISCO SUPPLEMENTATION  INJECTION:  Left KNEE    PROCEDURE: Risks, benefits, and alternatives to the injections were discussed in detail with the patient. The risks discussed included but are not limited to infection, skin reactions, hot swollen joints, and anaphylaxis. After informed consent was provided, the patient lay supine on the exam table. The superolateral aspect of the left knee was prepped with Chlora-prep. The skin and subcutaneous tissues were anesthetized with ethyl chloride spray and 1% lidocaine injected with a 20-gauge needle. Needle was left in place and the lidocaine syringe was exchanged for the Durolane syringe. 3 mL of Durolane was injected without resistance. The needle was withdrawn and the puncture site sealed with a Band-Aid. The patient tolerated the procedure well.            Assessment: Eyad Dickson is a 78 y.o. male who presents for viscosupplementation injections in both knees.  This patient is being treated conservatively for the arthritis in both of his knees. Encounter Diagnoses   Name Primary? Primary osteoarthritis of both knees Yes    Pain in both knees, unspecified chronicity          Treatment plan: Follow-up with provider in [place] after move  Observe postinjection precautions  Rest   Ice 20 minutes ever 1-2 hours PRN  Utilize medications as prescribed  Activity modification  Lightweight exercise/low impact exercise  Appropriate diet/weight loss                Hans Reinoso PA-C  03/16/23 11:00 AM  Physician Assistant - Certified         This dictation was performed with a verbal recognition program ([employer]) and it was checked for errors. It is possible that there are still dictated errors within this office note. If so, please bring any errors to my attention for an addendum. All efforts were made to ensure that this office note is accurate.

## 2023-03-18 DIAGNOSIS — Z00.00 ANNUAL PHYSICAL EXAM: ICD-10-CM

## 2023-03-18 NOTE — TELEPHONE ENCOUNTER
Medication:   Requested Prescriptions     Pending Prescriptions Disp Refills    pravastatin (PRAVACHOL) 40 MG tablet [Pharmacy Med Name: Pravastatin Sodium 40 MG Oral Tablet] 90 tablet 3     Sig: TAKE 1 TABLET BY MOUTH DAILY     Last Filled:  12/12/2022    Last appt: 10/17/2022   Next appt: Visit date not found    Last Lipid:   Lab Results   Component Value Date/Time    CHOL 231 10/20/2022 10:14 AM    TRIG 104 10/20/2022 10:14 AM    HDL 79 10/20/2022 10:14 AM    HDL 56 08/11/2010 10:18 AM    LDLCALC 131 10/20/2022 10:14 AM

## 2023-03-20 RX ORDER — PRAVASTATIN SODIUM 40 MG
40 TABLET ORAL DAILY
Qty: 90 TABLET | Refills: 3 | Status: SHIPPED | OUTPATIENT
Start: 2023-03-20

## 2023-04-20 SDOH — ECONOMIC STABILITY: TRANSPORTATION INSECURITY
IN THE PAST 12 MONTHS, HAS LACK OF TRANSPORTATION KEPT YOU FROM MEETINGS, WORK, OR FROM GETTING THINGS NEEDED FOR DAILY LIVING?: PATIENT DECLINED

## 2023-04-20 SDOH — ECONOMIC STABILITY: FOOD INSECURITY: WITHIN THE PAST 12 MONTHS, YOU WORRIED THAT YOUR FOOD WOULD RUN OUT BEFORE YOU GOT MONEY TO BUY MORE.: PATIENT DECLINED

## 2023-04-20 SDOH — ECONOMIC STABILITY: INCOME INSECURITY: HOW HARD IS IT FOR YOU TO PAY FOR THE VERY BASICS LIKE FOOD, HOUSING, MEDICAL CARE, AND HEATING?: PATIENT DECLINED

## 2023-04-20 SDOH — ECONOMIC STABILITY: FOOD INSECURITY: WITHIN THE PAST 12 MONTHS, THE FOOD YOU BOUGHT JUST DIDN'T LAST AND YOU DIDN'T HAVE MONEY TO GET MORE.: PATIENT DECLINED

## 2023-04-20 SDOH — ECONOMIC STABILITY: HOUSING INSECURITY
IN THE LAST 12 MONTHS, WAS THERE A TIME WHEN YOU DID NOT HAVE A STEADY PLACE TO SLEEP OR SLEPT IN A SHELTER (INCLUDING NOW)?: PATIENT REFUSED

## 2023-04-21 ENCOUNTER — OFFICE VISIT (OUTPATIENT)
Dept: PRIMARY CARE CLINIC | Age: 68
End: 2023-04-21
Payer: MEDICARE

## 2023-04-21 VITALS
WEIGHT: 181.6 LBS | DIASTOLIC BLOOD PRESSURE: 76 MMHG | TEMPERATURE: 97.8 F | HEART RATE: 71 BPM | OXYGEN SATURATION: 94 % | BODY MASS INDEX: 26.82 KG/M2 | SYSTOLIC BLOOD PRESSURE: 104 MMHG

## 2023-04-21 DIAGNOSIS — M54.50 LEFT LUMBAR PAIN: Primary | ICD-10-CM

## 2023-04-21 PROCEDURE — 4004F PT TOBACCO SCREEN RCVD TLK: CPT | Performed by: FAMILY MEDICINE

## 2023-04-21 PROCEDURE — G8417 CALC BMI ABV UP PARAM F/U: HCPCS | Performed by: FAMILY MEDICINE

## 2023-04-21 PROCEDURE — 99213 OFFICE O/P EST LOW 20 MIN: CPT | Performed by: FAMILY MEDICINE

## 2023-04-21 PROCEDURE — 1123F ACP DISCUSS/DSCN MKR DOCD: CPT | Performed by: FAMILY MEDICINE

## 2023-04-21 PROCEDURE — G8427 DOCREV CUR MEDS BY ELIG CLIN: HCPCS | Performed by: FAMILY MEDICINE

## 2023-04-21 PROCEDURE — 3017F COLORECTAL CA SCREEN DOC REV: CPT | Performed by: FAMILY MEDICINE

## 2023-04-21 RX ORDER — CYCLOBENZAPRINE HCL 5 MG
5 TABLET ORAL EVERY 8 HOURS PRN
Qty: 30 TABLET | Refills: 0 | Status: SHIPPED | OUTPATIENT
Start: 2023-04-21 | End: 2023-05-01

## 2023-04-21 RX ORDER — METHYLPREDNISOLONE 4 MG/1
TABLET ORAL
Qty: 1 KIT | Refills: 0 | Status: SHIPPED | OUTPATIENT
Start: 2023-04-21 | End: 2023-04-27

## 2023-04-21 ASSESSMENT — ENCOUNTER SYMPTOMS
EYE ITCHING: 0
ABDOMINAL PAIN: 0
VOMITING: 0
TROUBLE SWALLOWING: 0
DIARRHEA: 0
BACK PAIN: 1
NAUSEA: 0
EYE DISCHARGE: 0
COUGH: 0
SHORTNESS OF BREATH: 0
SORE THROAT: 0

## 2023-04-21 ASSESSMENT — PATIENT HEALTH QUESTIONNAIRE - PHQ9
2. FEELING DOWN, DEPRESSED OR HOPELESS: 0
SUM OF ALL RESPONSES TO PHQ9 QUESTIONS 1 & 2: 0
1. LITTLE INTEREST OR PLEASURE IN DOING THINGS: 0
SUM OF ALL RESPONSES TO PHQ QUESTIONS 1-9: 0

## 2023-04-21 NOTE — PROGRESS NOTES
Exam  Constitutional:       General: He is not in acute distress. Appearance: Normal appearance. HENT:      Head: Normocephalic and atraumatic. Nose: Nose normal. No congestion or rhinorrhea. Cardiovascular:      Rate and Rhythm: Normal rate and regular rhythm. Heart sounds: Normal heart sounds. No murmur heard. Pulmonary:      Effort: Pulmonary effort is normal.      Breath sounds: Normal breath sounds. No wheezing. Musculoskeletal:         General: Tenderness present. No swelling. Cervical back: Normal range of motion and neck supple. Comments: Some difficulty getting up from a sitting position and also sitting back down on a chair  Tenderness of the left lumbar paraspinal muscles with palpation  No tenderness of the mid spine or right side    Pain elicited with range of motion of lumbar spine, especially with flexing and bending     Skin:     General: Skin is warm and dry. Neurological:      General: No focal deficit present. Mental Status: He is alert and oriented to person, place, and time. Psychiatric:         Mood and Affect: Mood normal.         Behavior: Behavior normal.         An electronic signature was used to authenticate this note.     --Prashanth Hinton MD

## 2023-04-27 ENCOUNTER — OFFICE VISIT (OUTPATIENT)
Dept: ORTHOPEDIC SURGERY | Age: 68
End: 2023-04-27

## 2023-04-27 VITALS — WEIGHT: 181 LBS | BODY MASS INDEX: 26.81 KG/M2 | HEIGHT: 69 IN

## 2023-04-27 DIAGNOSIS — M17.0 PRIMARY OSTEOARTHRITIS OF BOTH KNEES: Primary | ICD-10-CM

## 2023-04-27 RX ORDER — METHYLPREDNISOLONE ACETATE 40 MG/ML
40 INJECTION, SUSPENSION INTRA-ARTICULAR; INTRALESIONAL; INTRAMUSCULAR; SOFT TISSUE ONCE
Status: COMPLETED | OUTPATIENT
Start: 2023-04-27 | End: 2023-04-27

## 2023-04-27 RX ADMIN — METHYLPREDNISOLONE ACETATE 40 MG: 40 INJECTION, SUSPENSION INTRA-ARTICULAR; INTRALESIONAL; INTRAMUSCULAR; SOFT TISSUE at 09:51

## 2023-06-02 LAB
PSA, TOTAL: <0.01 NG/ML (ref 0–4)
TESTOSTERONE: <10 NG/DL (ref 175–781)

## 2023-09-11 LAB
PSA, TOTAL: <0.01 NG/ML (ref 0–4)
TESTOSTERONE: <10 NG/DL (ref 175–781)

## 2024-01-16 ENCOUNTER — OFFICE VISIT (OUTPATIENT)
Dept: ORTHOPEDIC SURGERY | Age: 69
End: 2024-01-16
Payer: MEDICARE

## 2024-01-16 VITALS — HEIGHT: 69 IN | BODY MASS INDEX: 26.81 KG/M2 | WEIGHT: 181 LBS

## 2024-01-16 DIAGNOSIS — M25.561 PAIN IN BOTH KNEES, UNSPECIFIED CHRONICITY: ICD-10-CM

## 2024-01-16 DIAGNOSIS — M17.0 PRIMARY OSTEOARTHRITIS OF BOTH KNEES: Primary | ICD-10-CM

## 2024-01-16 DIAGNOSIS — M25.562 PAIN IN BOTH KNEES, UNSPECIFIED CHRONICITY: ICD-10-CM

## 2024-01-16 PROCEDURE — 20610 DRAIN/INJ JOINT/BURSA W/O US: CPT | Performed by: PHYSICIAN ASSISTANT

## 2024-01-16 PROCEDURE — 99213 OFFICE O/P EST LOW 20 MIN: CPT | Performed by: PHYSICIAN ASSISTANT

## 2024-01-16 PROCEDURE — 4004F PT TOBACCO SCREEN RCVD TLK: CPT | Performed by: PHYSICIAN ASSISTANT

## 2024-01-16 PROCEDURE — G8417 CALC BMI ABV UP PARAM F/U: HCPCS | Performed by: PHYSICIAN ASSISTANT

## 2024-01-16 PROCEDURE — G8484 FLU IMMUNIZE NO ADMIN: HCPCS | Performed by: PHYSICIAN ASSISTANT

## 2024-01-16 PROCEDURE — G8427 DOCREV CUR MEDS BY ELIG CLIN: HCPCS | Performed by: PHYSICIAN ASSISTANT

## 2024-01-16 PROCEDURE — 1123F ACP DISCUSS/DSCN MKR DOCD: CPT | Performed by: PHYSICIAN ASSISTANT

## 2024-01-16 PROCEDURE — 3017F COLORECTAL CA SCREEN DOC REV: CPT | Performed by: PHYSICIAN ASSISTANT

## 2024-01-16 RX ORDER — LIDOCAINE HYDROCHLORIDE 10 MG/ML
1 INJECTION, SOLUTION EPIDURAL; INFILTRATION; INTRACAUDAL; PERINEURAL ONCE
Status: COMPLETED | OUTPATIENT
Start: 2024-01-16 | End: 2024-01-16

## 2024-01-16 RX ORDER — METHYLPREDNISOLONE ACETATE 40 MG/ML
40 INJECTION, SUSPENSION INTRA-ARTICULAR; INTRALESIONAL; INTRAMUSCULAR; SOFT TISSUE ONCE
Status: COMPLETED | OUTPATIENT
Start: 2024-01-16 | End: 2024-01-16

## 2024-01-16 RX ADMIN — METHYLPREDNISOLONE ACETATE 40 MG: 40 INJECTION, SUSPENSION INTRA-ARTICULAR; INTRALESIONAL; INTRAMUSCULAR; SOFT TISSUE at 10:02

## 2024-01-16 RX ADMIN — LIDOCAINE HYDROCHLORIDE 1 ML: 10 INJECTION, SOLUTION EPIDURAL; INFILTRATION; INTRACAUDAL; PERINEURAL at 10:01

## 2024-01-16 RX ADMIN — LIDOCAINE HYDROCHLORIDE 1 ML: 10 INJECTION, SOLUTION EPIDURAL; INFILTRATION; INTRACAUDAL; PERINEURAL at 10:02

## 2024-01-16 RX ADMIN — METHYLPREDNISOLONE ACETATE 40 MG: 40 INJECTION, SUSPENSION INTRA-ARTICULAR; INTRALESIONAL; INTRAMUSCULAR; SOFT TISSUE at 10:03

## 2024-01-17 NOTE — PROGRESS NOTES
Date:  2024    Name:  Galindo Dickson  Address:  [address] [place] [id number]    :  1955      Age:   68 y.o.        Chief Complaint    Knee Pain (Ck bilat knees)      History of Present Illness:  Galindo Dickson is a 68 y.o. male who presents for follow-up evaluation of his knee pain.  This patient is being treated conservatively for the arthritis in both of his knees.  This conservative treatment includes: rest, ice, elevation, bracing, home exercises, activity modification, OTC medications as needed, corticosteroid injections and visco supplementation injections.  Currently the patient rates his left knee pain 9/10 in his right knee 7/10.  He describes as dull and achy as well as intermittently sharp to the joint line of both knees.  He has a walking tolerance of 10 to 15 minutes.  He also attest to intermittent instability within the knee predominantly on stairs and uneven terrain.  The patient denies any new injury.  The patient denies any catching, giving way, joint locking, numbness, paresthesias, or weakness.        Pain Assessment  Location of Pain: Knee  Location Modifiers: Left, Right  Severity of Pain: 8  Quality of Pain: Aching, Sharp  Duration of Pain: Persistent  Frequency of Pain: Constant  Aggravating Factors: Walking, Stairs, Bending  Limiting Behavior: Some  Relieving Factors: Rest  Result of Injury: No  Work-Related Injury: No  Are there other pain locations you wish to document?: No    Past Medical History:   Diagnosis Date    COPD     mild- no inhalers    Elevated liver function tests     Giardiasis     Hyperlipidemia     Kidney stone         Past Surgical History:   Procedure Laterality Date    CARDIOVASCULAR STRESS TEST  2008    negative    CARDIOVASCULAR STRESS TEST  2010    NANCY    COLONOSCOPY  2004    Dr. Lechuga    COLONOSCOPY  2008    Dr. Lechuga    COLONOSCOPY  2013    OTHER SURGICAL HISTORY Left 2015    LEFT KNEE OPEN PERONEAL NERVE DECOMPRESSION

## 2024-01-29 ENCOUNTER — TELEPHONE (OUTPATIENT)
Dept: ORTHOPEDIC SURGERY | Age: 69
End: 2024-01-29

## 2024-01-29 NOTE — TELEPHONE ENCOUNTER
General Question     Subject: DMV PLACARD FORM-SC  Patient and /or Facility Request: IRVING GONZALEZ  Contact Number: [phone]    PATIENT WANTED TO TOUCH BASE REGARDING DMV PLACARD FORM NEEDED FOR [place].     HE STATED THAT HE SENT THE FORM FRIDAY AND HAS THE NUMBER CODE OF FORM: [id number]. HE ASKED TO HAVE TEAM CALL HIM IF THEY HAVE NOT RECEIVED THE FORM.     PLEASE ADVISE

## 2024-02-06 ENCOUNTER — TELEPHONE (OUTPATIENT)
Dept: ORTHOPEDIC SURGERY | Age: 69
End: 2024-02-06

## 2024-02-06 NOTE — TELEPHONE ENCOUNTER
General Question     Subject: HANDICAP PLACARD  Patient and /or Facility Request: IRVING GONZALEZ  Contact Number: [phone]    PT CALLED STATING HE IS A PATIENT OF DR SINGLETON AND RAYSA, HE SENT A [email or web address] MESSAGE INCLUDING FORMS FOR HANDICAP PLACARD THAT RAYSA NEEDS TO SIGN FOR HANDICAP PLACARD IN [place].    PLEASE CALL PT BACK AT THE ABOVE NUMBER.

## 2024-02-08 ENCOUNTER — TELEPHONE (OUTPATIENT)
Dept: ORTHOPEDIC SURGERY | Age: 69
End: 2024-02-08

## 2024-02-08 NOTE — TELEPHONE ENCOUNTER
General Question     Subject: HANDICAPPED PLACQUERED  Patient and /or Facility Request: Galindo Dickson \"[place]\"  Contact Number: [phone]      THE PT STATES THAT HE EMAILED RAYSA ANOTHER FORM FOR A HANDICAPPED PLACQUERED.  HE SAID RAYSA NEEDS TO SIGN IT AND SEND IT BACK TO THE PT IN HIS MYCHART.

## 2024-03-12 ENCOUNTER — TELEPHONE (OUTPATIENT)
Dept: ORTHOPEDIC SURGERY | Age: 69
End: 2024-03-12

## 2024-03-12 NOTE — TELEPHONE ENCOUNTER
Surgery and/or Procedure Scheduling     Contact Name: Galindo Dickson \"[place]\"   Surgical/Procedure Request: L KNEE SX THE WEEK OF 7-8-24  Patient Contact Number: [phone]

## 2024-03-13 NOTE — TELEPHONE ENCOUNTER
Spoke with patient and I let him know that we are moving sx days that week but I havent nailed down what days we are moving them too. He is coming in to see  on 4/9, Im hoping to have a better sx schedule by then

## 2024-04-04 DIAGNOSIS — Z00.00 ANNUAL PHYSICAL EXAM: ICD-10-CM

## 2024-04-05 RX ORDER — PRAVASTATIN SODIUM 40 MG
40 TABLET ORAL DAILY
Qty: 90 TABLET | Refills: 0 | Status: SHIPPED | OUTPATIENT
Start: 2024-04-05

## 2024-04-05 NOTE — TELEPHONE ENCOUNTER
Medication:   Requested Prescriptions     Pending Prescriptions Disp Refills    pravastatin (PRAVACHOL) 40 MG tablet [Pharmacy Med Name: Pravastatin Sodium 40 MG Oral Tablet] 90 tablet 3     Sig: TAKE 1 TABLET BY MOUTH DAILY     Last Filled:  3.20.23    Last appt: 4/21/2023   Next appt: 4/11/2024    Last OARRS:        No data to display

## 2024-04-09 ENCOUNTER — PREP FOR PROCEDURE (OUTPATIENT)
Dept: ORTHOPEDIC SURGERY | Age: 69
End: 2024-04-09

## 2024-04-09 ENCOUNTER — HOSPITAL ENCOUNTER (OUTPATIENT)
Dept: GENERAL RADIOLOGY | Age: 69
Discharge: HOME | End: 2024-04-09
Payer: COMMERCIAL

## 2024-04-09 ENCOUNTER — OFFICE VISIT (OUTPATIENT)
Dept: ORTHOPEDIC SURGERY | Age: 69
End: 2024-04-09
Payer: MEDICARE

## 2024-04-09 VITALS — BODY MASS INDEX: 26.81 KG/M2 | WEIGHT: 181 LBS | HEIGHT: 69 IN

## 2024-04-09 DIAGNOSIS — M17.0 PRIMARY OSTEOARTHRITIS OF BOTH KNEES: Primary | ICD-10-CM

## 2024-04-09 DIAGNOSIS — M25.562 PAIN IN BOTH KNEES, UNSPECIFIED CHRONICITY: ICD-10-CM

## 2024-04-09 DIAGNOSIS — M25.561 PAIN IN BOTH KNEES, UNSPECIFIED CHRONICITY: ICD-10-CM

## 2024-04-09 DIAGNOSIS — M17.12 PRIMARY OSTEOARTHRITIS OF LEFT KNEE: ICD-10-CM

## 2024-04-09 DIAGNOSIS — M17.12 OSTEOARTHRITIS OF LEFT KNEE, UNSPECIFIED OSTEOARTHRITIS TYPE: ICD-10-CM

## 2024-04-09 PROCEDURE — 99214 OFFICE O/P EST MOD 30 MIN: CPT | Performed by: PHYSICIAN ASSISTANT

## 2024-04-09 PROCEDURE — 1123F ACP DISCUSS/DSCN MKR DOCD: CPT | Performed by: PHYSICIAN ASSISTANT

## 2024-04-09 PROCEDURE — 3017F COLORECTAL CA SCREEN DOC REV: CPT | Performed by: PHYSICIAN ASSISTANT

## 2024-04-09 PROCEDURE — G8417 CALC BMI ABV UP PARAM F/U: HCPCS | Performed by: PHYSICIAN ASSISTANT

## 2024-04-09 PROCEDURE — 77073 BONE LENGTH STUDIES: CPT

## 2024-04-09 PROCEDURE — 4004F PT TOBACCO SCREEN RCVD TLK: CPT | Performed by: PHYSICIAN ASSISTANT

## 2024-04-09 PROCEDURE — G8428 CUR MEDS NOT DOCUMENT: HCPCS | Performed by: PHYSICIAN ASSISTANT

## 2024-04-09 RX ORDER — KETOROLAC TROMETHAMINE 30 MG/ML
15 INJECTION, SOLUTION INTRAMUSCULAR; INTRAVENOUS ONCE
Status: CANCELLED | OUTPATIENT
Start: 2024-04-09 | End: 2024-04-09

## 2024-04-09 NOTE — PROGRESS NOTES
Date:  2024    Name:  Galindo Dickson  Address:  [address] [place] [id number]    :  1955      Age:   68 y.o.        Chief Complaint    Knee Pain (F/u eduin knee)      History of Present Illness:  Galindo Dickson is a 68 y.o. male who presents for follow-up evaluation of his bilateral knee pain.  The patient was last seen in January and was given bilateral corticosteroid injections.  He has been optimizing for a total knee replacement.  He feels that the corticosteroid injections only lasted 2 to 3 weeks.  His pain is now worse and he rates it 10/10 and describes as dull, achy and occasionally sharp.  He notes increasing sensations of instability that occur daily.  All of this is despite conservative treatment including: rest, ice, elevation, bracing, physical therapy, home exercises, activity modification, ibuprofen and Tylenol as needed, corticosteroid injections and visco supplementation injections. The patient denies any new injury.             Past Medical History:   Diagnosis Date    COPD     mild- no inhalers    Elevated liver function tests     Giardiasis     Hyperlipidemia     Kidney stone         Past Surgical History:   Procedure Laterality Date    CARDIOVASCULAR STRESS TEST  2008    negative    CARDIOVASCULAR STRESS TEST  2010    NANCY    COLONOSCOPY  2004    Dr. Lechuga    COLONOSCOPY  2008    Dr. Lechuga    COLONOSCOPY      OTHER SURGICAL HISTORY Left 2015    LEFT KNEE OPEN PERONEAL NERVE DECOMPRESSION    SKIN BIOPSY  2011    telangiectasia, benign       Family History   Problem Relation Age of Onset    Cancer Father         Prostate ca in 60's    Heart Attack Brother         At age 47    Coronary Art Dis Brother        Social History     Socioeconomic History    Marital status:      Spouse name: None    Number of children: None    Years of education: None    Highest education level: None   Occupational History    Occupation:    Tobacco

## 2024-04-09 NOTE — H&P (VIEW-ONLY)
Date:  2024    Name:  Galindo Dickson  Address:  [address] [place] [id number]    :  1955      Age:   68 y.o.        Chief Complaint    Knee Pain (F/u eduin knee)      History of Present Illness:  Galindo Dickson is a 68 y.o. male who presents for follow-up evaluation of his bilateral knee pain.  The patient was last seen in January and was given bilateral corticosteroid injections.  He has been optimizing for a total knee replacement.  He feels that the corticosteroid injections only lasted 2 to 3 weeks.  His pain is now worse and he rates it 10/10 and describes as dull, achy and occasionally sharp.  He notes increasing sensations of instability that occur daily.  All of this is despite conservative treatment including: rest, ice, elevation, bracing, physical therapy, home exercises, activity modification, ibuprofen and Tylenol as needed, corticosteroid injections and visco supplementation injections. The patient denies any new injury.             Past Medical History:   Diagnosis Date    COPD     mild- no inhalers    Elevated liver function tests     Giardiasis     Hyperlipidemia     Kidney stone         Past Surgical History:   Procedure Laterality Date    CARDIOVASCULAR STRESS TEST  2008    negative    CARDIOVASCULAR STRESS TEST  2010    [facility]    COLONOSCOPY  2004    Dr. Lechuga    COLONOSCOPY  2008    Dr. Lechuga    COLONOSCOPY      OTHER SURGICAL HISTORY Left 2015    LEFT KNEE OPEN PERONEAL NERVE DECOMPRESSION    SKIN BIOPSY  2011    telangiectasia, benign       Family History   Problem Relation Age of Onset    Cancer Father         Prostate ca in 60's    Heart Attack Brother         At age 47    Coronary Art Dis Brother        Social History     Socioeconomic History    Marital status:      Spouse name: None    Number of children: None    Years of education: None    Highest education level: None   Occupational History    Occupation:    Tobacco

## 2024-04-11 ENCOUNTER — OFFICE VISIT (OUTPATIENT)
Dept: PRIMARY CARE CLINIC | Age: 69
End: 2024-04-11

## 2024-04-11 ENCOUNTER — TELEPHONE (OUTPATIENT)
Dept: ADMINISTRATIVE | Age: 69
End: 2024-04-11

## 2024-04-11 VITALS
DIASTOLIC BLOOD PRESSURE: 80 MMHG | OXYGEN SATURATION: 99 % | HEART RATE: 67 BPM | TEMPERATURE: 97.1 F | BODY MASS INDEX: 27.02 KG/M2 | SYSTOLIC BLOOD PRESSURE: 116 MMHG | WEIGHT: 183 LBS | RESPIRATION RATE: 12 BRPM

## 2024-04-11 DIAGNOSIS — J41.0 SIMPLE CHRONIC BRONCHITIS (HCC): ICD-10-CM

## 2024-04-11 DIAGNOSIS — C61 PROSTATE CA (HCC): ICD-10-CM

## 2024-04-11 DIAGNOSIS — Z01.818 PRE-OP EVALUATION: Primary | ICD-10-CM

## 2024-04-11 DIAGNOSIS — M17.12 PRIMARY OSTEOARTHRITIS OF LEFT KNEE: ICD-10-CM

## 2024-04-11 DIAGNOSIS — E78.2 COMBINED HYPERLIPIDEMIA: ICD-10-CM

## 2024-04-11 PROBLEM — C7A.020: Status: ACTIVE | Noted: 2024-04-11

## 2024-04-11 LAB
ALBUMIN SERPL-MCNC: 4.7 G/DL (ref 3.4–5)
ALBUMIN/GLOB SERPL: 1.7 {RATIO} (ref 1.1–2.2)
ALP SERPL-CCNC: 104 U/L (ref 40–129)
ALT SERPL-CCNC: 26 U/L (ref 10–40)
ANION GAP SERPL CALCULATED.3IONS-SCNC: 11 MMOL/L (ref 3–16)
APTT BLD: 28.4 SEC (ref 22.1–36.4)
AST SERPL-CCNC: 22 U/L (ref 15–37)
BASOPHILS # BLD: 0 K/UL (ref 0–0.2)
BASOPHILS NFR BLD: 0.5 %
BILIRUB SERPL-MCNC: 0.4 MG/DL (ref 0–1)
BILIRUB UR QL STRIP.AUTO: NEGATIVE
BUN SERPL-MCNC: 20 MG/DL (ref 7–20)
CALCIUM SERPL-MCNC: 9.9 MG/DL (ref 8.3–10.6)
CHLORIDE SERPL-SCNC: 102 MMOL/L (ref 99–110)
CLARITY UR: CLEAR
CO2 SERPL-SCNC: 29 MMOL/L (ref 21–32)
COLOR UR: YELLOW
CREAT SERPL-MCNC: 0.9 MG/DL (ref 0.8–1.3)
CRP SERPL-MCNC: <3 MG/L (ref 0–5.1)
DEPRECATED RDW RBC AUTO: 13.2 % (ref 12.4–15.4)
EOSINOPHIL # BLD: 0.5 K/UL (ref 0–0.6)
EOSINOPHIL NFR BLD: 7 %
ERYTHROCYTE [SEDIMENTATION RATE] IN BLOOD BY WESTERGREN METHOD: 9 MM/HR (ref 0–20)
GFR SERPLBLD CREATININE-BSD FMLA CKD-EPI: >90 ML/MIN/{1.73_M2}
GLUCOSE SERPL-MCNC: 104 MG/DL (ref 70–99)
GLUCOSE UR STRIP.AUTO-MCNC: NEGATIVE MG/DL
HCT VFR BLD AUTO: 39.7 % (ref 40.5–52.5)
HGB BLD-MCNC: 13.8 G/DL (ref 13.5–17.5)
HGB UR QL STRIP.AUTO: NEGATIVE
INR PPP: 1.05 (ref 0.85–1.15)
KETONES UR STRIP.AUTO-MCNC: NEGATIVE MG/DL
LEUKOCYTE ESTERASE UR QL STRIP.AUTO: NEGATIVE
LYMPHOCYTES # BLD: 1.1 K/UL (ref 1–5.1)
LYMPHOCYTES NFR BLD: 16.2 %
MCH RBC QN AUTO: 31.7 PG (ref 26–34)
MCHC RBC AUTO-ENTMCNC: 34.6 G/DL (ref 31–36)
MCV RBC AUTO: 91.5 FL (ref 80–100)
MONOCYTES # BLD: 0.5 K/UL (ref 0–1.3)
MONOCYTES NFR BLD: 8 %
NEUTROPHILS # BLD: 4.4 K/UL (ref 1.7–7.7)
NEUTROPHILS NFR BLD: 68.3 %
NITRITE UR QL STRIP.AUTO: NEGATIVE
PH UR STRIP.AUTO: 6 [PH] (ref 5–8)
PLATELET # BLD AUTO: 235 K/UL (ref 135–450)
PMV BLD AUTO: 8.7 FL (ref 5–10.5)
POTASSIUM SERPL-SCNC: 4.4 MMOL/L (ref 3.5–5.1)
PREALB SERPL-MCNC: 22 MG/DL (ref 20–40)
PROT SERPL-MCNC: 7.4 G/DL (ref 6.4–8.2)
PROT UR STRIP.AUTO-MCNC: NEGATIVE MG/DL
PROTHROMBIN TIME: 13.9 SEC (ref 11.9–14.9)
RBC # BLD AUTO: 4.34 M/UL (ref 4.2–5.9)
SODIUM SERPL-SCNC: 142 MMOL/L (ref 136–145)
SP GR UR STRIP.AUTO: 1.02 (ref 1–1.03)
UA DIPSTICK W REFLEX MICRO PNL UR: NORMAL
URN SPEC COLLECT METH UR: NORMAL
UROBILINOGEN UR STRIP-ACNC: 0.2 E.U./DL
WBC # BLD AUTO: 6.5 K/UL (ref 4–11)

## 2024-04-11 RX ORDER — RELUGOLIX 120 MG/1
120 TABLET, FILM COATED ORAL DAILY
COMMUNITY
Start: 2024-03-29

## 2024-04-11 ASSESSMENT — ENCOUNTER SYMPTOMS
RESPIRATORY NEGATIVE: 1
GASTROINTESTINAL NEGATIVE: 1
EYES NEGATIVE: 1
ALLERGIC/IMMUNOLOGIC NEGATIVE: 1

## 2024-04-11 ASSESSMENT — PATIENT HEALTH QUESTIONNAIRE - PHQ9
SUM OF ALL RESPONSES TO PHQ QUESTIONS 1-9: 0
SUM OF ALL RESPONSES TO PHQ9 QUESTIONS 1 & 2: 0
SUM OF ALL RESPONSES TO PHQ QUESTIONS 1-9: 0
SUM OF ALL RESPONSES TO PHQ QUESTIONS 1-9: 0
2. FEELING DOWN, DEPRESSED OR HOPELESS: NOT AT ALL
SUM OF ALL RESPONSES TO PHQ QUESTIONS 1-9: 0
1. LITTLE INTEREST OR PLEASURE IN DOING THINGS: NOT AT ALL

## 2024-04-11 NOTE — PROGRESS NOTES
Breath sounds: Normal breath sounds. No wheezing or rales.   Chest:      Chest wall: No tenderness.   Abdominal:      General: Bowel sounds are normal. There is no distension.      Palpations: Abdomen is soft. There is no mass.      Tenderness: There is no abdominal tenderness. There is no guarding or rebound.   Musculoskeletal:         General: No tenderness. Normal range of motion.      Cervical back: Normal range of motion and neck supple.   Skin:     Findings: No rash.   Neurological:      Mental Status: He is alert and oriented to person, place, and time.         ASSESSMENT/PLAN:   Diagnosis Orders   1. Pre-op evaluation  EKG 12 lead    EKG 12 lead    Basic Metabolic Panel    CBC    Hepatic Function Panel      2. Primary osteoarthritis of left knee        3. Prostate CA (HCC)        4. Simple chronic bronchitis (HCC)        5. Combined hyperlipidemia            1. Discussed the risk of surgery including bleeding and infection, and the risks of general anesthetic including MI, CVA, sudden death or even reaction to anesthetic medications. The patient understands the risks, any and all questions were answered to the patient's satisfaction.  2. EKG NSR no acute changes   3.  Okay for the planned surgery    Date of Surgery Update (To be completed by Attending Surgeon day of surgery)  Copy is given to the pt and one is faxed to the surgeon.

## 2024-04-12 LAB
25(OH)D3 SERPL-MCNC: 36.6 NG/ML
BACTERIA UR CULT: NORMAL
EST. AVERAGE GLUCOSE BLD GHB EST-MCNC: 122.6 MG/DL
HBA1C MFR BLD: 5.9 %
MRSA DNA SPEC QL NAA+PROBE: NORMAL

## 2024-04-13 LAB — ZINC SERPL-MCNC: 83.6 UG/DL (ref 60–120)

## 2024-04-15 RX ORDER — SODIUM CHLORIDE 0.9 % (FLUSH) 0.9 %
5-40 SYRINGE (ML) INJECTION EVERY 12 HOURS SCHEDULED
Status: CANCELLED | OUTPATIENT
Start: 2024-05-08

## 2024-04-15 RX ORDER — DEXAMETHASONE SODIUM PHOSPHATE 10 MG/ML
10 INJECTION, SOLUTION INTRAMUSCULAR; INTRAVENOUS ONCE
Status: CANCELLED | OUTPATIENT
Start: 2024-05-08 | End: 2024-04-15

## 2024-04-15 RX ORDER — SODIUM CHLORIDE 9 MG/ML
INJECTION, SOLUTION INTRAVENOUS PRN
Status: CANCELLED | OUTPATIENT
Start: 2024-05-08

## 2024-04-15 RX ORDER — ACETAMINOPHEN 325 MG/1
1000 TABLET ORAL ONCE
Status: CANCELLED | OUTPATIENT
Start: 2024-05-08 | End: 2024-04-15

## 2024-04-15 RX ORDER — SODIUM CHLORIDE 0.9 % (FLUSH) 0.9 %
5-40 SYRINGE (ML) INJECTION PRN
Status: CANCELLED | OUTPATIENT
Start: 2024-05-08

## 2024-04-16 NOTE — TELEPHONE ENCOUNTER
PA requsted via [facility] by Online thru [facility] w/clinicals.  Reference # [id number]

## 2024-04-19 NOTE — TELEPHONE ENCOUNTER
APPROVAL     DX CODE: M17.12   CPT CODE: 34165   AREA OF SURGERY:  LEFT KNEE   DATE RANGE: 05/08/2024 to 08/06/2024  LOCATION: [facility]  INSURANCE: [facility]  AUTH #: [id number]     Approval scanned in Media

## 2024-04-25 ENCOUNTER — TELEPHONE (OUTPATIENT)
Dept: ORTHOPEDIC SURGERY | Age: 69
End: 2024-04-25

## 2024-04-25 NOTE — TELEPHONE ENCOUNTER
Orthopedic Nurse Navigator Summary    Patient Name:  Galindo Dickson  Anticipated Date of Surgery:  05/08/24  Attended Pre-op Education Class:  Video sent to patient email 04/23/24- he watched it  PCP: Tamiko Conteh MD  Date of PCP visit for H&P: 04/11/24  Is patient in a Pain Management program:  Review of Medical history reveals history of: COPD, Hyperlipidemia, Peripheral neuropathy, H/O Prostate Cancer    Critical Lab Values  - Hemoglobin (g/dL):  Date: 04/11/24 Value 13.8  - Hematocrit(%): Date: 04/11/24  Value 39.7  - HgbA1C:  Date: 04/11/24 Value 5.9  - Albumin:  Date: 04/11/24  Value 4.7  - BUN:  Date: 04/11/24  Value 20  - Creatinine:  Date: 04/11/24  Value 0.9    04/11/24 MRSA swab- negative    Coronary Artery Disease/HTN/CHF history: No  Does the patient see a Cardiologist: No  Date of most recent cardiac appt:  On any anticoagulation:  Aspirin 81 mg QD    Diabetes History:  No  Most recent HgbA1C: 5.9  Pulmonary:  COPD/Emphysema/Use of home oxygen: COPD  Alcohol use: Yes    BMI greater than 40 at time of scheduling:  No  Additional medical concerns: Patient lives in [place].  He is coming in town and will stay for 2-3 months PO at this [facility] home.  Additional recommendations for above concerns:  Attended Pre-Hab program:    Anticipated Discharge Disposition:  Home with OPT  Who will be with patient at home following discharge: wife and son  Equipment patient already has:  none  Bedroom on first or second floor:  second- 10 steps.  He is probably going to set something up on the first floor to stay for the first few days.  Bathroom on first or second floor:  both- half bath on first floor  Weight bearing status:  wbat  Pre-op ambulatory status: painful ambulation  Number of entry steps:  2  Caregiver assistance:  full time    Norma Whittington RN  Date:  04/25/24

## 2024-04-30 ENCOUNTER — OFFICE VISIT (OUTPATIENT)
Dept: ONCOLOGY | Age: 69
End: 2024-04-30
Payer: MEDICARE

## 2024-04-30 VITALS
HEART RATE: 63 BPM | WEIGHT: 185 LBS | SYSTOLIC BLOOD PRESSURE: 134 MMHG | HEIGHT: 69 IN | BODY MASS INDEX: 27.4 KG/M2 | DIASTOLIC BLOOD PRESSURE: 79 MMHG | RESPIRATION RATE: 22 BRPM | OXYGEN SATURATION: 98 % | TEMPERATURE: 98.7 F

## 2024-04-30 DIAGNOSIS — C61 PRIMARY MALIGNANT NEOPLASM OF PROSTATE (HCC): Primary | ICD-10-CM

## 2024-04-30 DIAGNOSIS — D37.3 LOW GRADE MUCINOUS NEOPLASM OF APPENDIX: ICD-10-CM

## 2024-04-30 PROCEDURE — G8428 CUR MEDS NOT DOCUMENT: HCPCS | Performed by: INTERNAL MEDICINE

## 2024-04-30 PROCEDURE — 99204 OFFICE O/P NEW MOD 45 MIN: CPT | Performed by: INTERNAL MEDICINE

## 2024-04-30 PROCEDURE — 1036F TOBACCO NON-USER: CPT | Performed by: INTERNAL MEDICINE

## 2024-04-30 PROCEDURE — 1123F ACP DISCUSS/DSCN MKR DOCD: CPT | Performed by: INTERNAL MEDICINE

## 2024-04-30 PROCEDURE — 3017F COLORECTAL CA SCREEN DOC REV: CPT | Performed by: INTERNAL MEDICINE

## 2024-04-30 PROCEDURE — G8417 CALC BMI ABV UP PARAM F/U: HCPCS | Performed by: INTERNAL MEDICINE

## 2024-04-30 RX ORDER — ACETAMINOPHEN 325 MG/1
650 TABLET ORAL
Status: CANCELLED | OUTPATIENT
Start: 2024-05-02

## 2024-04-30 RX ORDER — ALBUTEROL SULFATE 90 UG/1
4 AEROSOL, METERED RESPIRATORY (INHALATION) PRN
Status: CANCELLED | OUTPATIENT
Start: 2024-05-02

## 2024-04-30 RX ORDER — SODIUM CHLORIDE 9 MG/ML
INJECTION, SOLUTION INTRAVENOUS CONTINUOUS
Status: CANCELLED | OUTPATIENT
Start: 2024-05-02

## 2024-04-30 RX ORDER — EPINEPHRINE 1 MG/ML
0.3 INJECTION, SOLUTION, CONCENTRATE INTRAVENOUS PRN
Status: CANCELLED | OUTPATIENT
Start: 2024-05-02

## 2024-04-30 RX ORDER — TAMSULOSIN HYDROCHLORIDE 0.4 MG/1
0.4 CAPSULE ORAL DAILY
Qty: 30 CAPSULE | Refills: 5 | Status: SHIPPED | OUTPATIENT
Start: 2024-04-30

## 2024-04-30 RX ORDER — ONDANSETRON 2 MG/ML
8 INJECTION INTRAMUSCULAR; INTRAVENOUS
Status: CANCELLED | OUTPATIENT
Start: 2024-05-02

## 2024-04-30 RX ORDER — DIPHENHYDRAMINE HYDROCHLORIDE 50 MG/ML
50 INJECTION INTRAMUSCULAR; INTRAVENOUS
Status: CANCELLED | OUTPATIENT
Start: 2024-05-02

## 2024-04-30 NOTE — PROGRESS NOTES
NEW PATIENT INTAKE    Referral Diagnosis: Malignant neoplasm of prostate; Malignant neoplasm of appendix; Family history of malignant neoplasm of prostate      Referring Provider: Adria Aguirre MD    Primary Care Provider: Tamiko Conteh MD      Family History of Cancer/ Hematology Disorders: Father with prostate and colon cancers and melanoma.    Presenting Symptoms: Malignant neoplasm of prostate; Malignant neoplasm of appendix; Family history of malignant neoplasm of prostate    Chronological History of Pertinent Events:     69yo white male, “[place]”    PMH: ED, malignant neoplasm of appendix, Elevated PSA, Malignant neoplasm of prostate, Family hx of prostate cancer, Other osteoporosis without current pathological fracture  [facility]: Fissurectomy, Rectal Polyps, Prostate Bx 2021, Prostatectomy 3/2022, Appendectomy (2022)      12/16/21 - EPIC        2/7/22 - Met with Oncologist, Dr. Aguirre at [facility] ([facility])  New patient consult - PSA on 11/2/21 was 7.50  Prostate biopsy reviewed.  Long discussion regarding treatment options.  Discussed AS, DVP and XRT.  Discussed tumor recurrence possibility, ED, incontinence, possible organ damage. Patient VU.  Patient prefers surgical approach. Will proceed with LNS dvp and BLN dissection.    2/7/22 - Met with Radiation Oncologist, Michael Swartz MD ([facility])  New patient consult for XRT  Had lengthy discussed with patient and his wife re: radiotherapeutic treatment options for patient's recent diagnosis of prostate cancer.  PMH: HLD; has family history of prostate cancer in his father.  Reviewed PSA levels.   Prostate biopsy pursued by Dr. Collier on 12/16/21:  Gland size from bx was 29.1 cm in volume. From bx, 7 of 12 cores were cancerous. Of the cancerous cores, there were 4 with [place] 3+4. There were also 3 other cores with [place] 3+3, 6 of the cores on the right were involved with neoplasm.    Patient has 3-4 times per night nocturia. He denies much in the

## 2024-04-30 NOTE — PROGRESS NOTES
Insecurity: Not on file (4/20/2023)   Transportation Needs: No Transportation Needs (10/12/2021)    PRAPARE - Transportation     Lack of Transportation (Medical): No     Lack of Transportation (Non-Medical): No   Physical Activity: Sufficiently Active (10/12/2021)    Exercise Vital Sign     Days of Exercise per Week: 5 days     Minutes of Exercise per Session: 50 min   Stress: No Stress Concern Present (10/12/2021)    [place] [employer] - Occupational Stress Questionnaire     Feeling of Stress : Not at all   Social Connections: Unknown (10/12/2021)    Social Connection and Isolation Panel [NHANES]     Frequency of Communication with Friends and Family: More than three times a week     Frequency of Social Gatherings with Friends and Family: Twice a week     Attends Latter day Services: Not on file     Active Member of Clubs or Organizations: Not on file     Attends Club or Organization Meetings: Never     Marital Status:    Intimate Partner Violence: Not on file   Housing Stability: Low Risk  (10/12/2021)    Housing Stability Vital Sign     Unable to Pay for Housing in the Last Year: No     Number of Places Lived in the Last Year: 2     Unstable Housing in the Last Year: No     Current Outpatient Medications   Medication Sig Dispense Refill    Calcium-Magnesium-Vitamin D (CALCIUM 1200+D3 PO)       ORGOVYX 120 MG TABS Take 120 mg by mouth daily      pravastatin (PRAVACHOL) 40 MG tablet TAKE 1 TABLET BY MOUTH DAILY 90 tablet 0    Handicap Placard MISC by Does not apply route One year 1 each 0    Flaxseed, Linseed, (FLAX SEED OIL) 1000 MG CAPS Take  by mouth.      aspirin 81 MG tablet Take 1 tablet by mouth daily       No current facility-administered medications for this visit.       OBJECTIVE:  /79   Pulse 63   Temp 98.7 °F (37.1 °C) (Oral)   Resp 22   Ht 1.74 m (5' 8.5\") Comment: taken w/o shoes  Wt 83.9 kg (185 lb)   SpO2 98%   BMI 27.72 kg/m²     Physical

## 2024-04-30 NOTE — PATIENT INSTRUCTIONS
Patient Information from Today's Visit    History of prostate cancer reviewed. We will try to get the Eligard injection approved prior to your trip. If it does not get approved, we will re-order the Orgovyx.    Treatment Summary has been discussed and given to patient:N/A  Follow Up: 3 months    Please refer to After Visit Summary or OnLivehart for upcoming appointment information. If you have any questions regarding your upcoming schedule please reach out to your care team through CultureMap or call [phone].    -------------------------------------------------------------------------------------------------------------------  Please call our office at [phone] if you have any  of the following symptoms:   Fever of 100.5 or greater  Chills  Shortness of breath  Swelling or pain in one leg    After office hours an answering service is available and will contact a provider for emergencies or if you are experiencing any of the above symptoms.    Patient did express an interest in My Chart.  My Chart log in information explained on the after visit summary printout at the check-out desk.    DARIEN WESTON RN

## 2024-05-02 ENCOUNTER — HOSPITAL ENCOUNTER (OUTPATIENT)
Dept: INFUSION THERAPY | Age: 69
Setting detail: INFUSION SERIES
Discharge: HOME | End: 2024-05-02
Payer: COMMERCIAL

## 2024-05-02 ENCOUNTER — TELEPHONE (OUTPATIENT)
Dept: ONCOLOGY | Age: 69
End: 2024-05-02

## 2024-05-02 VITALS
RESPIRATION RATE: 16 BRPM | HEART RATE: 75 BPM | TEMPERATURE: 98.6 F | DIASTOLIC BLOOD PRESSURE: 79 MMHG | SYSTOLIC BLOOD PRESSURE: 131 MMHG | OXYGEN SATURATION: 98 %

## 2024-05-02 DIAGNOSIS — Z19.1 HORMONE SENSITIVE MALIGNANCY STATUS: Primary | ICD-10-CM

## 2024-05-02 DIAGNOSIS — C61 PRIMARY MALIGNANT NEOPLASM OF PROSTATE (HCC): ICD-10-CM

## 2024-05-02 PROCEDURE — 6360000002 HC RX W HCPCS: Performed by: INTERNAL MEDICINE

## 2024-05-02 PROCEDURE — 96402 CHEMO HORMON ANTINEOPL SQ/IM: CPT

## 2024-05-02 RX ORDER — ACETAMINOPHEN 325 MG/1
650 TABLET ORAL
OUTPATIENT
Start: 2024-07-25

## 2024-05-02 RX ORDER — ALBUTEROL SULFATE 90 UG/1
4 AEROSOL, METERED RESPIRATORY (INHALATION) PRN
OUTPATIENT
Start: 2024-07-25

## 2024-05-02 RX ORDER — EPINEPHRINE 1 MG/ML
0.3 INJECTION, SOLUTION, CONCENTRATE INTRAVENOUS PRN
OUTPATIENT
Start: 2024-07-25

## 2024-05-02 RX ORDER — DIPHENHYDRAMINE HYDROCHLORIDE 50 MG/ML
50 INJECTION INTRAMUSCULAR; INTRAVENOUS
OUTPATIENT
Start: 2024-07-25

## 2024-05-02 RX ORDER — ONDANSETRON 2 MG/ML
8 INJECTION INTRAMUSCULAR; INTRAVENOUS
OUTPATIENT
Start: 2024-07-25

## 2024-05-02 RX ORDER — SODIUM CHLORIDE 9 MG/ML
INJECTION, SOLUTION INTRAVENOUS CONTINUOUS
OUTPATIENT
Start: 2024-07-25

## 2024-05-02 RX ADMIN — LEUPROLIDE ACETATE 22.5 MG: 22.5 INJECTION, SUSPENSION, EXTENDED RELEASE SUBCUTANEOUS at 16:25

## 2024-05-02 NOTE — TELEPHONE ENCOUNTER
RN called patient to let him know that Eligard injection has been authorized by insurance. Appt offered for today, 5/2/24, at 4:30 pm. Patient accepted appt, verbalized understanding. No further questions for RN.

## 2024-05-02 NOTE — PROGRESS NOTES
Arrived to the [facility]fusion [facility]. Eligard injection completed.   Provided education on Eligard.     Patient instructed to report any side affects to ordering provider.  Patient tolerated well.   Any issues or concerns during appointment: no  Patient will call to get next appointment scheduled.   Discharged ambulatory.

## 2024-05-07 ENCOUNTER — ANESTHESIA EVENT (OUTPATIENT)
Dept: OPERATING ROOM | Age: 69
End: 2024-05-07
Payer: COMMERCIAL

## 2024-05-07 ENCOUNTER — OFFICE VISIT (OUTPATIENT)
Dept: ORTHOPEDIC SURGERY | Age: 69
End: 2024-05-07

## 2024-05-07 ENCOUNTER — HOSPITAL ENCOUNTER (OUTPATIENT)
Dept: PHYSICAL THERAPY | Age: 69
Setting detail: THERAPIES SERIES
Discharge: HOME | End: 2024-05-07
Payer: MEDICARE

## 2024-05-07 VITALS — BODY MASS INDEX: 27.74 KG/M2 | WEIGHT: 183 LBS | HEIGHT: 68 IN

## 2024-05-07 DIAGNOSIS — R26.9 GAIT ABNORMALITY: ICD-10-CM

## 2024-05-07 DIAGNOSIS — M25.562 PAIN IN BOTH KNEES, UNSPECIFIED CHRONICITY: ICD-10-CM

## 2024-05-07 DIAGNOSIS — M25.562 LEFT KNEE PAIN, UNSPECIFIED CHRONICITY: Primary | ICD-10-CM

## 2024-05-07 DIAGNOSIS — Z01.818 PRE-OP EXAMINATION: Primary | ICD-10-CM

## 2024-05-07 DIAGNOSIS — R53.1 WEAKNESS: ICD-10-CM

## 2024-05-07 DIAGNOSIS — M25.561 PAIN IN BOTH KNEES, UNSPECIFIED CHRONICITY: ICD-10-CM

## 2024-05-07 DIAGNOSIS — M17.0 PRIMARY OSTEOARTHRITIS OF BOTH KNEES: ICD-10-CM

## 2024-05-07 PROCEDURE — 97161 PT EVAL LOW COMPLEX 20 MIN: CPT | Performed by: PHYSICAL THERAPIST

## 2024-05-07 PROCEDURE — 97530 THERAPEUTIC ACTIVITIES: CPT | Performed by: PHYSICAL THERAPIST

## 2024-05-07 PROCEDURE — MISC250 COMPRESSION STOCKING: Performed by: PHYSICIAN ASSISTANT

## 2024-05-07 RX ORDER — LIDOCAINE HYDROCHLORIDE 10 MG/ML
1 INJECTION, SOLUTION EPIDURAL; INFILTRATION; INTRACAUDAL; PERINEURAL ONCE
Status: COMPLETED | OUTPATIENT
Start: 2024-05-07 | End: 2024-05-07

## 2024-05-07 RX ORDER — TRIAMCINOLONE ACETONIDE 40 MG/ML
40 INJECTION, SUSPENSION INTRA-ARTICULAR; INTRAMUSCULAR ONCE
Status: COMPLETED | OUTPATIENT
Start: 2024-05-07 | End: 2024-05-07

## 2024-05-07 RX ADMIN — LIDOCAINE HYDROCHLORIDE 1 ML: 10 INJECTION, SOLUTION EPIDURAL; INFILTRATION; INTRACAUDAL; PERINEURAL at 13:32

## 2024-05-07 RX ADMIN — TRIAMCINOLONE ACETONIDE 40 MG: 40 INJECTION, SUSPENSION INTRA-ARTICULAR; INTRAMUSCULAR at 13:31

## 2024-05-07 NOTE — PLAN OF CARE
surgery      TREATMENT PLAN     Frequency/Duration: 2x/week for 8 weeks for the following treatment interventions:    Interventions:  Therapeutic Exercise (16485) including: strength training, ROM, and functional mobility  Therapeutic Activities (78801) including: functional mobility training and education.  Neuromuscular Re-education (04839) activation and proprioception, including postural re-education.    Gait Training (23139) for normalization of ambulation patterns and AD training.   Manual Therapy (10042) as indicated to include: Passive Range of Motion, Gr I-IV mobilizations, and Soft Tissue Mobilization  Modalities as needed that may include: Cryotherapy, Electrical Stimulation, and Vasoneumatic Compression    Plan: POC initiated as per evaluation    Electronically Signed by JERAMY Angulo PT, DPT  Date: 05/07/2024     Note: Portions of this note have been templated and/or copied from initial evaluation, reassessments and prior notes for documentation efficiency.

## 2024-05-07 NOTE — PROGRESS NOTES
Date:  2024    Name:  Galindo Dickson  Address:  [address] [place] [id number]    :  1955      Age:   68 y.o.            Chief Complaint    Knee Pain (Pre surgical discussion )      History of Present Illness:  Galindo Dickson is a 68 y.o. male who presents for their pre-op examination. The patient is in good health. The patient has no history of blood clots, no organ dysfunction, not diabetes, no known allergies to medications, and tolerates pain medication.  The patient drinks on occasion and does not smoke. They have family and friends that are able to assist him after surgery.  The patient recently had a physical from her PCP and was cleared for surgery and stated being in good health.  Patient's PCP note for preoperative clearance was reviewed.  All the patient's labs were reviewed.      History from the patients visit on 24.  Galindo Dickson is a 68 y.o. male who presents for follow-up evaluation of his bilateral knee pain.  The patient was last seen in January and was given bilateral corticosteroid injections.  He has been optimizing for a total knee replacement.  He feels that the corticosteroid injections only lasted 2 to 3 weeks.  His pain is now worse and he rates it 10/10 and describes as dull, achy and occasionally sharp.  He notes increasing sensations of instability that occur daily.  All of this is despite conservative treatment including: rest, ice, elevation, bracing, physical therapy, home exercises, activity modification, ibuprofen and Tylenol as needed, corticosteroid injections and visco supplementation injections. The patient denies any new injury.           Past Medical History:   Diagnosis Date    Cancer (HCC) 2022    Appendix    Cancer (HCC) 2022, 22    Prostate, Appendix    Cancer of appendix (HCC)         Cataract     COPD     mild- no inhalers    Elevated liver function tests     Giardiasis     Hx of smoking     Hyperlipidemia

## 2024-05-08 ENCOUNTER — HOSPITAL ENCOUNTER (OUTPATIENT)
Age: 69
Setting detail: OBSERVATION
Discharge: HOME | End: 2024-05-09
Attending: ORTHOPAEDIC SURGERY | Admitting: ORTHOPAEDIC SURGERY
Payer: COMMERCIAL

## 2024-05-08 ENCOUNTER — ANESTHESIA (OUTPATIENT)
Dept: OPERATING ROOM | Age: 69
End: 2024-05-08
Payer: COMMERCIAL

## 2024-05-08 DIAGNOSIS — Z96.652 STATUS POST TOTAL LEFT KNEE REPLACEMENT NOT USING CEMENT: Primary | ICD-10-CM

## 2024-05-08 PROCEDURE — 97530 THERAPEUTIC ACTIVITIES: CPT

## 2024-05-08 PROCEDURE — 97116 GAIT TRAINING THERAPY: CPT

## 2024-05-08 PROCEDURE — 2500000003 HC RX 250 WO HCPCS: Performed by: PHYSICIAN ASSISTANT

## 2024-05-08 PROCEDURE — 6360000002 HC RX W HCPCS: Performed by: NURSE ANESTHETIST, CERTIFIED REGISTERED

## 2024-05-08 PROCEDURE — 2709999900 HC NON-CHARGEABLE SUPPLY: Performed by: ORTHOPAEDIC SURGERY

## 2024-05-08 PROCEDURE — 3600000005 HC SURGERY LEVEL 5 BASE: Performed by: ORTHOPAEDIC SURGERY

## 2024-05-08 PROCEDURE — C1776 JOINT DEVICE (IMPLANTABLE): HCPCS | Performed by: ORTHOPAEDIC SURGERY

## 2024-05-08 PROCEDURE — 3700000000 HC ANESTHESIA ATTENDED CARE: Performed by: ORTHOPAEDIC SURGERY

## 2024-05-08 PROCEDURE — 2720000010 HC SURG SUPPLY STERILE: Performed by: ORTHOPAEDIC SURGERY

## 2024-05-08 PROCEDURE — 97161 PT EVAL LOW COMPLEX 20 MIN: CPT

## 2024-05-08 PROCEDURE — 6360000002 HC RX W HCPCS: Performed by: ORTHOPAEDIC SURGERY

## 2024-05-08 PROCEDURE — 2580000003 HC RX 258: Performed by: PHYSICIAN ASSISTANT

## 2024-05-08 PROCEDURE — A4217 STERILE WATER/SALINE, 500 ML: HCPCS | Performed by: ORTHOPAEDIC SURGERY

## 2024-05-08 PROCEDURE — 97166 OT EVAL MOD COMPLEX 45 MIN: CPT

## 2024-05-08 PROCEDURE — 2580000003 HC RX 258: Performed by: ANESTHESIOLOGY

## 2024-05-08 PROCEDURE — C1713 ANCHOR/SCREW BN/BN,TIS/BN: HCPCS | Performed by: ORTHOPAEDIC SURGERY

## 2024-05-08 PROCEDURE — 7100000000 HC PACU RECOVERY - FIRST 15 MIN: Performed by: ORTHOPAEDIC SURGERY

## 2024-05-08 PROCEDURE — 3600000015 HC SURGERY LEVEL 5 ADDTL 15MIN: Performed by: ORTHOPAEDIC SURGERY

## 2024-05-08 PROCEDURE — 2580000003 HC RX 258: Performed by: ORTHOPAEDIC SURGERY

## 2024-05-08 PROCEDURE — 2500000003 HC RX 250 WO HCPCS: Performed by: ORTHOPAEDIC SURGERY

## 2024-05-08 PROCEDURE — 6370000000 HC RX 637 (ALT 250 FOR IP): Performed by: PHYSICIAN ASSISTANT

## 2024-05-08 PROCEDURE — G0378 HOSPITAL OBSERVATION PER HR: HCPCS

## 2024-05-08 PROCEDURE — 64447 NJX AA&/STRD FEMORAL NRV IMG: CPT | Performed by: ANESTHESIOLOGY

## 2024-05-08 PROCEDURE — 6360000002 HC RX W HCPCS: Performed by: PHYSICIAN ASSISTANT

## 2024-05-08 PROCEDURE — 6360000002 HC RX W HCPCS: Performed by: ANESTHESIOLOGY

## 2024-05-08 PROCEDURE — 3700000001 HC ADD 15 MINUTES (ANESTHESIA): Performed by: ORTHOPAEDIC SURGERY

## 2024-05-08 PROCEDURE — 97535 SELF CARE MNGMENT TRAINING: CPT

## 2024-05-08 PROCEDURE — 2500000003 HC RX 250 WO HCPCS: Performed by: NURSE ANESTHETIST, CERTIFIED REGISTERED

## 2024-05-08 PROCEDURE — 7100000001 HC PACU RECOVERY - ADDTL 15 MIN: Performed by: ORTHOPAEDIC SURGERY

## 2024-05-08 DEVICE — JOURNEY BCS PATELLA  RESURFACING                                    ROUND 35 MM  STD
Type: IMPLANTABLE DEVICE | Site: KNEE | Status: FUNCTIONAL
Brand: JOURNEY

## 2024-05-08 DEVICE — ANCHOR SUT L14.7MM DIA5.5MM BIOCOMPOSITE FULL THRD W/ 2: Type: IMPLANTABLE DEVICE | Site: KNEE | Status: FUNCTIONAL

## 2024-05-08 DEVICE — JOURNEY TIBIAL BASEPLATE NONPOROUS                                    LEFT SIZE 6
Type: IMPLANTABLE DEVICE | Site: KNEE | Status: FUNCTIONAL
Brand: JOURNEY

## 2024-05-08 DEVICE — KNEE K1 TOT HEMI STD CEM IMPL CAPPED K1 SN: Type: IMPLANTABLE DEVICE | Site: KNEE | Status: FUNCTIONAL

## 2024-05-08 DEVICE — NON RIMMED SPEED PIN 65MM STERILE: Type: IMPLANTABLE DEVICE | Site: KNEE | Status: FUNCTIONAL

## 2024-05-08 DEVICE — CEMENT BNE 20ML 40GM FULL DOSE PMMA W/O ANTIBIO M VISC: Type: IMPLANTABLE DEVICE | Site: KNEE | Status: FUNCTIONAL

## 2024-05-08 DEVICE — JOURNEY II BCS FEMORAL OXINIUM                                    LEFT SIZE 7
Type: IMPLANTABLE DEVICE | Site: KNEE | Status: FUNCTIONAL
Brand: JOURNEY

## 2024-05-08 RX ORDER — ONDANSETRON 2 MG/ML
INJECTION INTRAMUSCULAR; INTRAVENOUS PRN
Status: DISCONTINUED | OUTPATIENT
Start: 2024-05-08 | End: 2024-05-08 | Stop reason: SDUPTHER

## 2024-05-08 RX ORDER — HYDROMORPHONE HYDROCHLORIDE 1 MG/ML
0.5 INJECTION, SOLUTION INTRAMUSCULAR; INTRAVENOUS; SUBCUTANEOUS EVERY 5 MIN PRN
Status: DISCONTINUED | OUTPATIENT
Start: 2024-05-08 | End: 2024-05-08 | Stop reason: HOSPADM

## 2024-05-08 RX ORDER — SODIUM CHLORIDE 0.9 % (FLUSH) 0.9 %
5-40 SYRINGE (ML) INJECTION PRN
Status: DISCONTINUED | OUTPATIENT
Start: 2024-05-08 | End: 2024-05-09 | Stop reason: HOSPADM

## 2024-05-08 RX ORDER — ACETAMINOPHEN 325 MG/1
1000 TABLET ORAL ONCE
Status: COMPLETED | OUTPATIENT
Start: 2024-05-08 | End: 2024-05-08

## 2024-05-08 RX ORDER — SODIUM CHLORIDE 0.9 % (FLUSH) 0.9 %
5-40 SYRINGE (ML) INJECTION PRN
Status: DISCONTINUED | OUTPATIENT
Start: 2024-05-08 | End: 2024-05-08 | Stop reason: HOSPADM

## 2024-05-08 RX ORDER — SODIUM CHLORIDE 0.9 % (FLUSH) 0.9 %
5-40 SYRINGE (ML) INJECTION EVERY 12 HOURS SCHEDULED
Status: DISCONTINUED | OUTPATIENT
Start: 2024-05-08 | End: 2024-05-08 | Stop reason: HOSPADM

## 2024-05-08 RX ORDER — ROPIVACAINE HYDROCHLORIDE 5 MG/ML
INJECTION, SOLUTION EPIDURAL; INFILTRATION; PERINEURAL
Status: COMPLETED
Start: 2024-05-08 | End: 2024-05-08

## 2024-05-08 RX ORDER — ACETAMINOPHEN 325 MG/1
650 TABLET ORAL
Status: DISCONTINUED | OUTPATIENT
Start: 2024-05-08 | End: 2024-05-08 | Stop reason: HOSPADM

## 2024-05-08 RX ORDER — HYDROMORPHONE HYDROCHLORIDE 2 MG/ML
INJECTION, SOLUTION INTRAMUSCULAR; INTRAVENOUS; SUBCUTANEOUS PRN
Status: DISCONTINUED | OUTPATIENT
Start: 2024-05-08 | End: 2024-05-08 | Stop reason: SDUPTHER

## 2024-05-08 RX ORDER — BISACODYL 5 MG/1
5 TABLET, DELAYED RELEASE ORAL DAILY
Status: DISCONTINUED | OUTPATIENT
Start: 2024-05-09 | End: 2024-05-09 | Stop reason: HOSPADM

## 2024-05-08 RX ORDER — ROPIVACAINE HYDROCHLORIDE 5 MG/ML
INJECTION, SOLUTION EPIDURAL; INFILTRATION; PERINEURAL
Status: COMPLETED | OUTPATIENT
Start: 2024-05-08 | End: 2024-05-08

## 2024-05-08 RX ORDER — DEXMEDETOMIDINE HYDROCHLORIDE 100 UG/ML
INJECTION, SOLUTION INTRAVENOUS PRN
Status: DISCONTINUED | OUTPATIENT
Start: 2024-05-08 | End: 2024-05-08 | Stop reason: SDUPTHER

## 2024-05-08 RX ORDER — MIDAZOLAM HYDROCHLORIDE 1 MG/ML
INJECTION INTRAMUSCULAR; INTRAVENOUS PRN
Status: DISCONTINUED | OUTPATIENT
Start: 2024-05-08 | End: 2024-05-08 | Stop reason: SDUPTHER

## 2024-05-08 RX ORDER — FENTANYL CITRATE 50 UG/ML
INJECTION, SOLUTION INTRAMUSCULAR; INTRAVENOUS PRN
Status: DISCONTINUED | OUTPATIENT
Start: 2024-05-08 | End: 2024-05-08 | Stop reason: SDUPTHER

## 2024-05-08 RX ORDER — ONDANSETRON 4 MG/1
4 TABLET, ORALLY DISINTEGRATING ORAL EVERY 8 HOURS PRN
Status: DISCONTINUED | OUTPATIENT
Start: 2024-05-08 | End: 2024-05-09 | Stop reason: HOSPADM

## 2024-05-08 RX ORDER — BUPIVACAINE HYDROCHLORIDE 7.5 MG/ML
INJECTION, SOLUTION INTRASPINAL
Status: COMPLETED | OUTPATIENT
Start: 2024-05-08 | End: 2024-05-08

## 2024-05-08 RX ORDER — FENTANYL CITRATE 50 UG/ML
25 INJECTION, SOLUTION INTRAMUSCULAR; INTRAVENOUS EVERY 5 MIN PRN
Status: DISCONTINUED | OUTPATIENT
Start: 2024-05-08 | End: 2024-05-08 | Stop reason: HOSPADM

## 2024-05-08 RX ORDER — IPRATROPIUM BROMIDE AND ALBUTEROL SULFATE 2.5; .5 MG/3ML; MG/3ML
1 SOLUTION RESPIRATORY (INHALATION)
Status: DISCONTINUED | OUTPATIENT
Start: 2024-05-08 | End: 2024-05-08 | Stop reason: HOSPADM

## 2024-05-08 RX ORDER — SODIUM CHLORIDE 9 MG/ML
INJECTION, SOLUTION INTRAVENOUS PRN
Status: DISCONTINUED | OUTPATIENT
Start: 2024-05-08 | End: 2024-05-09 | Stop reason: HOSPADM

## 2024-05-08 RX ORDER — ONDANSETRON 2 MG/ML
4 INJECTION INTRAMUSCULAR; INTRAVENOUS
Status: DISCONTINUED | OUTPATIENT
Start: 2024-05-08 | End: 2024-05-08 | Stop reason: HOSPADM

## 2024-05-08 RX ORDER — MORPHINE SULFATE 2 MG/ML
4 INJECTION, SOLUTION INTRAMUSCULAR; INTRAVENOUS EVERY 4 HOURS PRN
Status: DISCONTINUED | OUTPATIENT
Start: 2024-05-08 | End: 2024-05-09 | Stop reason: HOSPADM

## 2024-05-08 RX ORDER — ASPIRIN 81 MG/1
81 TABLET, CHEWABLE ORAL 2 TIMES DAILY
Status: DISCONTINUED | OUTPATIENT
Start: 2024-05-08 | End: 2024-05-09 | Stop reason: HOSPADM

## 2024-05-08 RX ORDER — LABETALOL HYDROCHLORIDE 5 MG/ML
10 INJECTION, SOLUTION INTRAVENOUS
Status: DISCONTINUED | OUTPATIENT
Start: 2024-05-08 | End: 2024-05-08 | Stop reason: HOSPADM

## 2024-05-08 RX ORDER — MAGNESIUM HYDROXIDE 1200 MG/15ML
LIQUID ORAL CONTINUOUS PRN
Status: DISCONTINUED | OUTPATIENT
Start: 2024-05-08 | End: 2024-05-08 | Stop reason: HOSPADM

## 2024-05-08 RX ORDER — PROPOFOL 10 MG/ML
INJECTION, EMULSION INTRAVENOUS PRN
Status: DISCONTINUED | OUTPATIENT
Start: 2024-05-08 | End: 2024-05-08 | Stop reason: SDUPTHER

## 2024-05-08 RX ORDER — SODIUM CHLORIDE 0.9 % (FLUSH) 0.9 %
5-40 SYRINGE (ML) INJECTION EVERY 12 HOURS SCHEDULED
Status: DISCONTINUED | OUTPATIENT
Start: 2024-05-08 | End: 2024-05-09 | Stop reason: HOSPADM

## 2024-05-08 RX ORDER — SODIUM CHLORIDE 9 MG/ML
INJECTION, SOLUTION INTRAVENOUS PRN
Status: DISCONTINUED | OUTPATIENT
Start: 2024-05-08 | End: 2024-05-08 | Stop reason: HOSPADM

## 2024-05-08 RX ORDER — TAMSULOSIN HYDROCHLORIDE 0.4 MG/1
0.4 CAPSULE ORAL DAILY
Status: DISCONTINUED | OUTPATIENT
Start: 2024-05-09 | End: 2024-05-09 | Stop reason: HOSPADM

## 2024-05-08 RX ORDER — OXYCODONE HYDROCHLORIDE 5 MG/1
5 TABLET ORAL EVERY 4 HOURS PRN
Status: DISCONTINUED | OUTPATIENT
Start: 2024-05-08 | End: 2024-05-09 | Stop reason: HOSPADM

## 2024-05-08 RX ORDER — LIDOCAINE HYDROCHLORIDE 20 MG/ML
INJECTION, SOLUTION INTRAVENOUS PRN
Status: DISCONTINUED | OUTPATIENT
Start: 2024-05-08 | End: 2024-05-08 | Stop reason: SDUPTHER

## 2024-05-08 RX ORDER — PRAVASTATIN SODIUM 40 MG
40 TABLET ORAL DAILY
Status: DISCONTINUED | OUTPATIENT
Start: 2024-05-09 | End: 2024-05-09 | Stop reason: HOSPADM

## 2024-05-08 RX ORDER — PROCHLORPERAZINE EDISYLATE 5 MG/ML
5 INJECTION INTRAMUSCULAR; INTRAVENOUS
Status: DISCONTINUED | OUTPATIENT
Start: 2024-05-08 | End: 2024-05-08 | Stop reason: HOSPADM

## 2024-05-08 RX ORDER — LIDOCAINE HYDROCHLORIDE 10 MG/ML
1 INJECTION, SOLUTION EPIDURAL; INFILTRATION; INTRACAUDAL; PERINEURAL
Status: DISCONTINUED | OUTPATIENT
Start: 2024-05-08 | End: 2024-05-08 | Stop reason: HOSPADM

## 2024-05-08 RX ORDER — OXYCODONE HYDROCHLORIDE 5 MG/1
10 TABLET ORAL EVERY 4 HOURS PRN
Status: DISCONTINUED | OUTPATIENT
Start: 2024-05-08 | End: 2024-05-09 | Stop reason: HOSPADM

## 2024-05-08 RX ORDER — PROPOFOL 10 MG/ML
INJECTION, EMULSION INTRAVENOUS CONTINUOUS PRN
Status: DISCONTINUED | OUTPATIENT
Start: 2024-05-08 | End: 2024-05-08 | Stop reason: SDUPTHER

## 2024-05-08 RX ORDER — NALOXONE HYDROCHLORIDE 0.4 MG/ML
INJECTION, SOLUTION INTRAMUSCULAR; INTRAVENOUS; SUBCUTANEOUS PRN
Status: DISCONTINUED | OUTPATIENT
Start: 2024-05-08 | End: 2024-05-08 | Stop reason: HOSPADM

## 2024-05-08 RX ORDER — ONDANSETRON 2 MG/ML
4 INJECTION INTRAMUSCULAR; INTRAVENOUS EVERY 6 HOURS PRN
Status: DISCONTINUED | OUTPATIENT
Start: 2024-05-08 | End: 2024-05-09 | Stop reason: HOSPADM

## 2024-05-08 RX ORDER — GLYCOPYRROLATE 0.2 MG/ML
INJECTION INTRAMUSCULAR; INTRAVENOUS PRN
Status: DISCONTINUED | OUTPATIENT
Start: 2024-05-08 | End: 2024-05-08 | Stop reason: SDUPTHER

## 2024-05-08 RX ORDER — ACETAMINOPHEN 500 MG
1000 TABLET ORAL EVERY 8 HOURS SCHEDULED
Status: DISCONTINUED | OUTPATIENT
Start: 2024-05-08 | End: 2024-05-09 | Stop reason: HOSPADM

## 2024-05-08 RX ORDER — DEXAMETHASONE SODIUM PHOSPHATE 10 MG/ML
10 INJECTION, SOLUTION INTRAMUSCULAR; INTRAVENOUS ONCE
Status: COMPLETED | OUTPATIENT
Start: 2024-05-08 | End: 2024-05-08

## 2024-05-08 RX ORDER — VANCOMYCIN HYDROCHLORIDE 1 G/20ML
INJECTION, POWDER, LYOPHILIZED, FOR SOLUTION INTRAVENOUS PRN
Status: DISCONTINUED | OUTPATIENT
Start: 2024-05-08 | End: 2024-05-08 | Stop reason: HOSPADM

## 2024-05-08 RX ORDER — FENTANYL CITRATE 50 UG/ML
INJECTION, SOLUTION INTRAMUSCULAR; INTRAVENOUS
Status: COMPLETED | OUTPATIENT
Start: 2024-05-08 | End: 2024-05-08

## 2024-05-08 RX ORDER — SODIUM CHLORIDE, SODIUM LACTATE, POTASSIUM CHLORIDE, CALCIUM CHLORIDE 600; 310; 30; 20 MG/100ML; MG/100ML; MG/100ML; MG/100ML
INJECTION, SOLUTION INTRAVENOUS CONTINUOUS
Status: DISCONTINUED | OUTPATIENT
Start: 2024-05-08 | End: 2024-05-08 | Stop reason: HOSPADM

## 2024-05-08 RX ORDER — PHENYLEPHRINE HYDROCHLORIDE 10 MG/ML
INJECTION INTRAVENOUS PRN
Status: DISCONTINUED | OUTPATIENT
Start: 2024-05-08 | End: 2024-05-08 | Stop reason: SDUPTHER

## 2024-05-08 RX ADMIN — TRANEXAMIC ACID 1000 MG: 100 INJECTION, SOLUTION INTRAVENOUS at 11:10

## 2024-05-08 RX ADMIN — FENTANYL CITRATE 25 MCG: 50 INJECTION INTRAMUSCULAR; INTRAVENOUS at 15:08

## 2024-05-08 RX ADMIN — PHENYLEPHRINE HYDROCHLORIDE 100 MCG: 10 INJECTION, SOLUTION INTRAVENOUS at 12:10

## 2024-05-08 RX ADMIN — PHENYLEPHRINE HYDROCHLORIDE 100 MCG: 10 INJECTION, SOLUTION INTRAVENOUS at 11:53

## 2024-05-08 RX ADMIN — PROPOFOL 100 MCG/KG/MIN: 10 INJECTION, EMULSION INTRAVENOUS at 10:55

## 2024-05-08 RX ADMIN — HYDROMORPHONE HYDROCHLORIDE 0.5 MG: 2 INJECTION, SOLUTION INTRAMUSCULAR; INTRAVENOUS; SUBCUTANEOUS at 13:47

## 2024-05-08 RX ADMIN — SODIUM CHLORIDE, POTASSIUM CHLORIDE, SODIUM LACTATE AND CALCIUM CHLORIDE: 600; 310; 30; 20 INJECTION, SOLUTION INTRAVENOUS at 08:11

## 2024-05-08 RX ADMIN — GLYCOPYRROLATE 0.2 MG: 0.2 INJECTION INTRAMUSCULAR; INTRAVENOUS at 11:34

## 2024-05-08 RX ADMIN — ONDANSETRON 4 MG: 2 INJECTION INTRAMUSCULAR; INTRAVENOUS at 10:55

## 2024-05-08 RX ADMIN — FENTANYL CITRATE 15 MCG: 50 INJECTION, SOLUTION INTRAMUSCULAR; INTRAVENOUS at 10:52

## 2024-05-08 RX ADMIN — MIDAZOLAM HYDROCHLORIDE 2 MG: 2 INJECTION, SOLUTION INTRAMUSCULAR; INTRAVENOUS at 09:02

## 2024-05-08 RX ADMIN — FENTANYL CITRATE 85 MCG: 50 INJECTION, SOLUTION INTRAMUSCULAR; INTRAVENOUS at 10:47

## 2024-05-08 RX ADMIN — PROPOFOL 40 MG: 10 INJECTION, EMULSION INTRAVENOUS at 10:55

## 2024-05-08 RX ADMIN — BUPIVACAINE HYDROCHLORIDE IN DEXTROSE 12 MG: 7.5 INJECTION, SOLUTION SUBARACHNOID at 10:52

## 2024-05-08 RX ADMIN — DEXAMETHASONE SODIUM PHOSPHATE 10 MG: 10 INJECTION, SOLUTION INTRAMUSCULAR; INTRAVENOUS at 08:22

## 2024-05-08 RX ADMIN — LIDOCAINE HYDROCHLORIDE 100 MG: 20 INJECTION, SOLUTION INTRAVENOUS at 10:55

## 2024-05-08 RX ADMIN — WATER 2000 MG: 1 INJECTION INTRAMUSCULAR; INTRAVENOUS; SUBCUTANEOUS at 11:09

## 2024-05-08 RX ADMIN — ASPIRIN 81 MG: 81 TABLET, CHEWABLE ORAL at 21:58

## 2024-05-08 RX ADMIN — SODIUM CHLORIDE, POTASSIUM CHLORIDE, SODIUM LACTATE AND CALCIUM CHLORIDE: 600; 310; 30; 20 INJECTION, SOLUTION INTRAVENOUS at 11:33

## 2024-05-08 RX ADMIN — ROPIVACAINE HYDROCHLORIDE 30 ML: 5 INJECTION, SOLUTION EPIDURAL; INFILTRATION; PERINEURAL at 09:04

## 2024-05-08 RX ADMIN — HYDROMORPHONE HYDROCHLORIDE 0.5 MG: 2 INJECTION, SOLUTION INTRAMUSCULAR; INTRAVENOUS; SUBCUTANEOUS at 13:40

## 2024-05-08 RX ADMIN — OXYCODONE 10 MG: 5 TABLET ORAL at 20:13

## 2024-05-08 RX ADMIN — FENTANYL CITRATE 100 MCG: 50 INJECTION, SOLUTION INTRAMUSCULAR; INTRAVENOUS at 09:02

## 2024-05-08 RX ADMIN — ACETAMINOPHEN 1000 MG: 500 TABLET ORAL at 21:57

## 2024-05-08 RX ADMIN — SODIUM CHLORIDE, PRESERVATIVE FREE 10 ML: 5 INJECTION INTRAVENOUS at 21:58

## 2024-05-08 RX ADMIN — HYDROMORPHONE HYDROCHLORIDE 0.5 MG: 1 INJECTION, SOLUTION INTRAMUSCULAR; INTRAVENOUS; SUBCUTANEOUS at 18:05

## 2024-05-08 RX ADMIN — HYDROMORPHONE HYDROCHLORIDE 0.5 MG: 1 INJECTION, SOLUTION INTRAMUSCULAR; INTRAVENOUS; SUBCUTANEOUS at 15:09

## 2024-05-08 RX ADMIN — PHENYLEPHRINE HYDROCHLORIDE 100 MCG: 10 INJECTION, SOLUTION INTRAVENOUS at 11:37

## 2024-05-08 RX ADMIN — PHENYLEPHRINE HYDROCHLORIDE 100 MCG: 10 INJECTION, SOLUTION INTRAVENOUS at 12:13

## 2024-05-08 RX ADMIN — HYDROMORPHONE HYDROCHLORIDE 0.5 MG: 2 INJECTION, SOLUTION INTRAMUSCULAR; INTRAVENOUS; SUBCUTANEOUS at 13:44

## 2024-05-08 RX ADMIN — PHENYLEPHRINE HYDROCHLORIDE 100 MCG: 10 INJECTION, SOLUTION INTRAVENOUS at 12:01

## 2024-05-08 RX ADMIN — HYDROMORPHONE HYDROCHLORIDE 0.5 MG: 1 INJECTION, SOLUTION INTRAMUSCULAR; INTRAVENOUS; SUBCUTANEOUS at 21:09

## 2024-05-08 RX ADMIN — PHENYLEPHRINE HYDROCHLORIDE 100 MCG: 10 INJECTION, SOLUTION INTRAVENOUS at 12:07

## 2024-05-08 RX ADMIN — ACETAMINOPHEN 975 MG: 325 TABLET ORAL at 08:13

## 2024-05-08 RX ADMIN — PHENYLEPHRINE HYDROCHLORIDE 100 MCG: 10 INJECTION, SOLUTION INTRAVENOUS at 11:45

## 2024-05-08 RX ADMIN — DEXMEDETOMIDINE HYDROCHLORIDE 4 MCG: 100 INJECTION, SOLUTION INTRAVENOUS at 10:59

## 2024-05-08 RX ADMIN — WATER 2000 MG: 1 INJECTION INTRAMUSCULAR; INTRAVENOUS; SUBCUTANEOUS at 21:58

## 2024-05-08 RX ADMIN — HYDROMORPHONE HYDROCHLORIDE 0.5 MG: 2 INJECTION, SOLUTION INTRAMUSCULAR; INTRAVENOUS; SUBCUTANEOUS at 13:52

## 2024-05-08 ASSESSMENT — PAIN DESCRIPTION - DESCRIPTORS
DESCRIPTORS: ACHING
DESCRIPTORS: DISCOMFORT

## 2024-05-08 ASSESSMENT — PAIN DESCRIPTION - ONSET
ONSET: ON-GOING
ONSET: ON-GOING

## 2024-05-08 ASSESSMENT — PAIN DESCRIPTION - LOCATION
LOCATION: KNEE

## 2024-05-08 ASSESSMENT — PAIN DESCRIPTION - FREQUENCY
FREQUENCY: CONTINUOUS
FREQUENCY: CONTINUOUS

## 2024-05-08 ASSESSMENT — PAIN DESCRIPTION - ORIENTATION
ORIENTATION: LEFT

## 2024-05-08 ASSESSMENT — PAIN SCALES - GENERAL
PAINLEVEL_OUTOF10: 7
PAINLEVEL_OUTOF10: 4
PAINLEVEL_OUTOF10: 4
PAINLEVEL_OUTOF10: 8
PAINLEVEL_OUTOF10: 3
PAINLEVEL_OUTOF10: 7
PAINLEVEL_OUTOF10: 9
PAINLEVEL_OUTOF10: 4
PAINLEVEL_OUTOF10: 8

## 2024-05-08 ASSESSMENT — PAIN - FUNCTIONAL ASSESSMENT: PAIN_FUNCTIONAL_ASSESSMENT: ACTIVITIES ARE NOT PREVENTED

## 2024-05-08 ASSESSMENT — PAIN DESCRIPTION - PAIN TYPE
TYPE: SURGICAL PAIN
TYPE: SURGICAL PAIN

## 2024-05-08 NOTE — ANESTHESIA POSTPROCEDURE EVALUATION
Department of Anesthesiology  Postprocedure Note    Patient: Galindo Dickson  MRN: [id number]  YOB: 1955  Date of evaluation: 5/8/2024    Procedure Summary       Date: 05/08/24 Room / Location: [place] [address] / [facility]    Anesthesia Start: 1043 Anesthesia Stop: 1358    Procedure: LEFT TOTAL KNEE  ARTHROPLASTY (Left) Diagnosis:       Primary osteoarthritis of left knee      (Primary osteoarthritis of left knee [M17.12])    Surgeons: Brian Sutherland MD Responsible Provider: Rosendo Bae DO    Anesthesia Type: MAC, spinal, regional ASA Status: 2            Anesthesia Type: No value filed.    Emeterio Phase I: Emeterio Score: 9    Emeterio Phase II:      Anesthesia Post Evaluation    Patient location during evaluation: PACU  Patient participation: complete - patient participated  Level of consciousness: awake and alert  Pain score: 0  Airway patency: patent  Nausea & Vomiting: no nausea and no vomiting  Cardiovascular status: hemodynamically stable  Respiratory status: acceptable  Hydration status: stable  Pain management: adequate and satisfactory to patient    No notable events documented.

## 2024-05-08 NOTE — ANESTHESIA PRE PROCEDURE
Department of Anesthesiology  Preprocedure Note       Name:  Galindo Dickson   Age:  68 y.o.  :  1955                                          MRN:  [id number]         Date:  2024      Surgeon: Surgeon(s):  Brian Sutherland MD    Procedure: Procedure(s):  LEFT TOTAL KNEE  ARTHROPLASTY    Medications prior to admission:   Prior to Admission medications    Medication Sig Start Date End Date Taking? Authorizing Provider   NONFORMULARY SMOKES MARIJUANA   Yes Wilfredo Guerra MD   Calcium-Magnesium-Vitamin D (CALCIUM 1200+D3 PO)  23   Wilfredo Guerra MD   tamsulosin (FLOMAX) 0.4 MG capsule Take 1 capsule by mouth daily 24   Talon Campbell MD   pravastatin (PRAVACHOL) 40 MG tablet TAKE 1 TABLET BY MOUTH DAILY 24   Tamiko Conteh MD   Handicap Placard MISC by Does not apply route One year 24   Shai Nuñez PA-C   Flaxseed, Linseed, (FLAX SEED OIL) 1000 MG CAPS Take  by mouth.    Wilfredo Guerra MD   aspirin 81 MG tablet Take 1 tablet by mouth daily    Wilfredo Guerra MD       Current medications:    Current Facility-Administered Medications   Medication Dose Route Frequency Provider Last Rate Last Admin   • lidocaine PF 1 % injection 1 mL  1 mL IntraDERmal Once PRN Orlin Mcgee DO       • lactated ringers IV soln infusion   IntraVENous Continuous Orlin Mcgee  mL/hr at 24 0811 New Bag at 24 0811   • sodium chloride flush 0.9 % injection 5-40 mL  5-40 mL IntraVENous 2 times per day Orlin Mcgee DO       • sodium chloride flush 0.9 % injection 5-40 mL  5-40 mL IntraVENous PRN Orlin Mcgee DO       • tranexamic acid (CYKLOKAPRON) 1,000 mg in sodium chloride 0.9 % 60 mL IVPB  1,000 mg IntraVENous On Call to OR Shai Nuñez PA-C       • sodium chloride flush 0.9 % injection 5-40 mL  5-40 mL IntraVENous 2 times per day Shai Nuñez PA-C       • sodium chloride flush 0.9 % injection 5-40 mL  5-40 mL IntraVENous

## 2024-05-08 NOTE — ANESTHESIA POSTPROCEDURE EVALUATION
Department of Anesthesiology  Postprocedure Note    Patient: Galindo Dickson  MRN: [id number]  YOB: 1955  Date of evaluation: 5/8/2024    Procedure Summary       Date: 05/08/24 Room / Location: [place] [address] / [facility]    Anesthesia Start: 1043 Anesthesia Stop: 1358    Procedure: LEFT TOTAL KNEE  ARTHROPLASTY (Left) Diagnosis:       Primary osteoarthritis of left knee      (Primary osteoarthritis of left knee [M17.12])    Surgeons: Brian Sutherland MD Responsible Provider: Rosendo Bae DO    Anesthesia Type: MAC, spinal, regional ASA Status: 2            Anesthesia Type: No value filed.    Emeterio Phase I: Emeterio Score: 10    Emeterio Phase II:      Anesthesia Post Evaluation    Patient location during evaluation: PACU  Patient participation: complete - patient participated  Level of consciousness: awake and alert  Pain score: 2  Airway patency: patent  Nausea & Vomiting: no nausea and no vomiting  Cardiovascular status: hemodynamically stable  Respiratory status: acceptable  Hydration status: stable  Pain management: adequate and satisfactory to patient    No notable events documented.

## 2024-05-08 NOTE — INTERVAL H&P NOTE
Update History & Physical    The patient's History and Physical of April 9, 2024 was reviewed with the patient and I examined the patient. There was no change. The surgical site was confirmed by the patient and me.     Plan: The risks, benefits, expected outcome, and alternative to the recommended procedure have been discussed with the patient. Patient understands and wants to proceed with the procedure.     Electronically signed by Luis Mckinnon MD on 5/8/2024 at 10:31 AM

## 2024-05-08 NOTE — ANESTHESIA PROCEDURE NOTES
Spinal Block    Patient location during procedure: OR  End time: 5/8/2024 10:53 AM  Reason for block: primary anesthetic and at surgeon's request  Staffing  Performed: resident/CRNA   Anesthesiologist: Abilio Maher MD  Resident/CRNA: Chaparro Saab APRN - CRNA  Performed by: Chaparro Saab APRN - CRNA  Authorized by: Abliio Maher MD    Spinal Block  Patient position: sitting  Prep: Betadine  Patient monitoring: cardiac monitor, continuous pulse ox, continuous capnometry, frequent blood pressure checks and oxygen  Approach: midline  Location: L3/L4  Guidance: paresthesia technique  Provider prep: mask and sterile gloves  Local infiltration: lidocaine  Needle  Needle type: Pencan   Needle gauge: 25 G  Needle length: 3.5 in  Assessment  Swirl obtained: Yes  CSF: clear  Attempts: 1  Hemodynamics: stable  Preanesthetic Checklist  Completed: patient identified, IV checked, site marked, risks and benefits discussed, surgical/procedural consents, equipment checked, pre-op evaluation, timeout performed, anesthesia consent given, oxygen available, monitors applied/VS acknowledged, fire risk safety assessment completed and verbalized and blood product R/B/A discussed and consented

## 2024-05-08 NOTE — DISCHARGE INSTRUCTIONS
Total Knee Replacement  Discharge Instructions    To prevent blood clot formation, you have been placed on the following medication:  81 mg Aspirin every 12 hours for 4 weeks  Surgical Site Care:  Dressing will be changed in office  Showering is permitted if waterproof dressing is applied or when sutures are removed and all areas of incision are healed.  Physical Therapy:  Weight Bearing Status:  WBAT, advance your activity as tolerated  Pain Medications  You were given Percocet.  This is a strong narcotic pain medication you make take for \"severe\" or \"moderate\" pain.  Always take this with food and never on an empty stomach.  You may take Tylenol or Naprosyn as an alternative pain medication when your pain is only \"mild\".   Wean off pain medications as you deem appropriate as long as pain is under control  Cold packs/Ice packs  May be used 3 times daily for 15-30 minutes as necessary  Be sure to have a barrier (cloth, clothing, towel) between the site and the ice pack to prevent frostbite   Contact our office if  Increased redness, swelling, drainage of any kind, and/or pain to surgery site.  As well as new onset fevers and or chills.  These could signify an infection.  Calf or thigh tenderness to touch as well as increased swelling or redness.  This could signify a clot formation.  Numbness or tingling to an area around the incision site or below the incision site (toes).  Any rash appears, increased  or new onset nausea/vomiting occur.  This may indicate a reaction to a medication.  Phone # [phone] - [facility].  Follow up with Dr. Moreno and Tenisha Weiss at scheduled appointment time.

## 2024-05-08 NOTE — ANESTHESIA PROCEDURE NOTES
Peripheral Block    Patient location during procedure: pre-op  Reason for block: post-op pain management and at surgeon's request  Start time: 5/8/2024 9:02 AM  End time: 5/8/2024 9:05 AM  Staffing  Performed: resident/CRNA   Anesthesiologist: Abilio Maher MD  Resident/CRNA: Chaparro Saab APRN - CRNA  Performed by: Chaparro Saab APRN - CRNA  Authorized by: Chaparro Saab APRN - CRNA    Preanesthetic Checklist  Completed: patient identified, IV checked, site marked, risks and benefits discussed, surgical/procedural consents, equipment checked, pre-op evaluation, timeout performed, anesthesia consent given, oxygen available, monitors applied/VS acknowledged, fire risk safety assessment completed and verbalized and blood product R/B/A discussed and consented  Peripheral Block   Patient position: supine  Prep: ChloraPrep  Provider prep: mask and sterile gloves  Patient monitoring: cardiac monitor, continuous pulse ox, continuous capnometry, frequent blood pressure checks, IV access, oxygen and responsive to questions  Block type: Femoral  Adductor canal  Laterality: left  Injection technique: single-shot  Guidance: ultrasound guided    Needle   Needle type: insulated echogenic nerve stimulator needle   Needle gauge: 20 G  Needle localization: anatomical landmarks and ultrasound guidance  Needle length: 8 cm  Assessment   Injection assessment: negative aspiration for heme, no paresthesia on injection, local visualized surrounding nerve on ultrasound and no intravascular symptoms  Paresthesia pain: none  Slow fractionated injection: yes  Hemodynamics: stable  Outcomes: uncomplicated and patient tolerated procedure well    Additional Notes  Time out: 0901    Local anesthetic injected in 2-3 mL increments following negative aspiration  Medications Administered  ropivacaine (NAROPIN) injection 0.5% - Perineural   30 mL - 5/8/2024 9:04:00 AM

## 2024-05-09 ENCOUNTER — HOSPITAL ENCOUNTER (OUTPATIENT)
Dept: PHYSICAL THERAPY | Age: 69
Setting detail: THERAPIES SERIES
Discharge: HOME | End: 2024-05-09
Payer: MEDICARE

## 2024-05-09 VITALS
HEART RATE: 75 BPM | OXYGEN SATURATION: 98 % | SYSTOLIC BLOOD PRESSURE: 125 MMHG | TEMPERATURE: 98.2 F | WEIGHT: 179.2 LBS | DIASTOLIC BLOOD PRESSURE: 75 MMHG | HEIGHT: 69 IN | BODY MASS INDEX: 26.54 KG/M2 | RESPIRATION RATE: 18 BRPM

## 2024-05-09 PROCEDURE — 97535 SELF CARE MNGMENT TRAINING: CPT

## 2024-05-09 PROCEDURE — G0378 HOSPITAL OBSERVATION PER HR: HCPCS

## 2024-05-09 PROCEDURE — 2580000003 HC RX 258: Performed by: PHYSICIAN ASSISTANT

## 2024-05-09 PROCEDURE — 96375 TX/PRO/DX INJ NEW DRUG ADDON: CPT

## 2024-05-09 PROCEDURE — 97530 THERAPEUTIC ACTIVITIES: CPT | Performed by: PHYSICAL THERAPIST

## 2024-05-09 PROCEDURE — 97016 VASOPNEUMATIC DEVICE THERAPY: CPT | Performed by: PHYSICAL THERAPIST

## 2024-05-09 PROCEDURE — 97530 THERAPEUTIC ACTIVITIES: CPT

## 2024-05-09 PROCEDURE — 6360000002 HC RX W HCPCS: Performed by: ORTHOPAEDIC SURGERY

## 2024-05-09 PROCEDURE — 6360000002 HC RX W HCPCS: Performed by: PHYSICIAN ASSISTANT

## 2024-05-09 PROCEDURE — 97116 GAIT TRAINING THERAPY: CPT

## 2024-05-09 PROCEDURE — 97110 THERAPEUTIC EXERCISES: CPT | Performed by: PHYSICAL THERAPIST

## 2024-05-09 PROCEDURE — 96374 THER/PROPH/DIAG INJ IV PUSH: CPT

## 2024-05-09 PROCEDURE — 6370000000 HC RX 637 (ALT 250 FOR IP): Performed by: PHYSICIAN ASSISTANT

## 2024-05-09 PROCEDURE — 97110 THERAPEUTIC EXERCISES: CPT

## 2024-05-09 PROCEDURE — 96376 TX/PRO/DX INJ SAME DRUG ADON: CPT

## 2024-05-09 RX ORDER — OXYCODONE HYDROCHLORIDE AND ACETAMINOPHEN 5; 325 MG/1; MG/1
1 TABLET ORAL EVERY 6 HOURS PRN
Qty: 28 TABLET | Refills: 0 | Status: SHIPPED | OUTPATIENT
Start: 2024-05-09 | End: 2024-05-09 | Stop reason: HOSPADM

## 2024-05-09 RX ORDER — SENNOSIDES 8.6 MG
1 TABLET ORAL DAILY
Qty: 120 TABLET | Refills: 0 | Status: SHIPPED | OUTPATIENT
Start: 2024-05-09

## 2024-05-09 RX ORDER — ONDANSETRON 4 MG/1
4 TABLET, FILM COATED ORAL 3 TIMES DAILY PRN
Qty: 15 TABLET | Refills: 0 | Status: SHIPPED | OUTPATIENT
Start: 2024-05-09

## 2024-05-09 RX ORDER — OXYCODONE HYDROCHLORIDE 5 MG/1
5 TABLET ORAL EVERY 4 HOURS PRN
Qty: 30 TABLET | Refills: 0 | Status: SHIPPED | OUTPATIENT
Start: 2024-05-09 | End: 2024-05-14

## 2024-05-09 RX ORDER — ASPIRIN 81 MG/1
81 TABLET ORAL 2 TIMES DAILY
Qty: 60 TABLET | Refills: 0 | Status: SHIPPED | OUTPATIENT
Start: 2024-05-09

## 2024-05-09 RX ADMIN — WATER 2000 MG: 1 INJECTION INTRAMUSCULAR; INTRAVENOUS; SUBCUTANEOUS at 06:37

## 2024-05-09 RX ADMIN — OXYCODONE 10 MG: 5 TABLET ORAL at 12:15

## 2024-05-09 RX ADMIN — MORPHINE SULFATE 4 MG: 2 INJECTION, SOLUTION INTRAMUSCULAR; INTRAVENOUS at 00:15

## 2024-05-09 RX ADMIN — ASPIRIN 81 MG: 81 TABLET, CHEWABLE ORAL at 08:29

## 2024-05-09 RX ADMIN — ACETAMINOPHEN 1000 MG: 500 TABLET ORAL at 06:36

## 2024-05-09 RX ADMIN — SODIUM CHLORIDE, PRESERVATIVE FREE 10 ML: 5 INJECTION INTRAVENOUS at 08:31

## 2024-05-09 RX ADMIN — ONDANSETRON 4 MG: 2 INJECTION INTRAMUSCULAR; INTRAVENOUS at 09:25

## 2024-05-09 RX ADMIN — OXYCODONE 10 MG: 5 TABLET ORAL at 08:29

## 2024-05-09 RX ADMIN — MORPHINE SULFATE 4 MG: 2 INJECTION, SOLUTION INTRAMUSCULAR; INTRAVENOUS at 04:29

## 2024-05-09 RX ADMIN — TAMSULOSIN HYDROCHLORIDE 0.4 MG: 0.4 CAPSULE ORAL at 08:29

## 2024-05-09 RX ADMIN — SODIUM CHLORIDE, PRESERVATIVE FREE 10 ML: 5 INJECTION INTRAVENOUS at 06:37

## 2024-05-09 RX ADMIN — SODIUM CHLORIDE, PRESERVATIVE FREE 10 ML: 5 INJECTION INTRAVENOUS at 04:30

## 2024-05-09 RX ADMIN — SODIUM CHLORIDE, PRESERVATIVE FREE 10 ML: 5 INJECTION INTRAVENOUS at 00:19

## 2024-05-09 RX ADMIN — PRAVASTATIN SODIUM 40 MG: 40 TABLET ORAL at 08:29

## 2024-05-09 ASSESSMENT — PAIN DESCRIPTION - LOCATION
LOCATION: LEG
LOCATION: KNEE

## 2024-05-09 ASSESSMENT — PAIN DESCRIPTION - ORIENTATION
ORIENTATION: LEFT
ORIENTATION: ANTERIOR;POSTERIOR

## 2024-05-09 ASSESSMENT — PAIN DESCRIPTION - ONSET
ONSET: ON-GOING

## 2024-05-09 ASSESSMENT — PAIN SCALES - GENERAL
PAINLEVEL_OUTOF10: 5
PAINLEVEL_OUTOF10: 5
PAINLEVEL_OUTOF10: 9
PAINLEVEL_OUTOF10: 8
PAINLEVEL_OUTOF10: 8
PAINLEVEL_OUTOF10: 6
PAINLEVEL_OUTOF10: 7

## 2024-05-09 ASSESSMENT — PAIN DESCRIPTION - PAIN TYPE
TYPE: SURGICAL PAIN

## 2024-05-09 ASSESSMENT — PAIN - FUNCTIONAL ASSESSMENT
PAIN_FUNCTIONAL_ASSESSMENT: PREVENTS OR INTERFERES WITH ALL ACTIVE AND SOME PASSIVE ACTIVITIES
PAIN_FUNCTIONAL_ASSESSMENT: PREVENTS OR INTERFERES WITH MANY ACTIVE NOT PASSIVE ACTIVITIES
PAIN_FUNCTIONAL_ASSESSMENT: PREVENTS OR INTERFERES WITH MANY ACTIVE NOT PASSIVE ACTIVITIES
PAIN_FUNCTIONAL_ASSESSMENT: PREVENTS OR INTERFERES WITH ALL ACTIVE AND SOME PASSIVE ACTIVITIES

## 2024-05-09 ASSESSMENT — PAIN DESCRIPTION - DESCRIPTORS
DESCRIPTORS: SHARP
DESCRIPTORS: DULL;ACHING

## 2024-05-09 ASSESSMENT — PAIN DESCRIPTION - FREQUENCY
FREQUENCY: CONTINUOUS

## 2024-05-09 NOTE — PLAN OF CARE
Problem: Safety - Adult  Goal: Free from fall injury  Outcome: Progressing  Note: All fall precautions in place. Call light within reach.       Problem: Pain  Goal: Verbalizes/displays adequate comfort level or baseline comfort level  Outcome: Progressing  Flowsheets (Taken 5/9/2024 0126)  Verbalizes/displays adequate comfort level or baseline comfort level:   Encourage patient to monitor pain and request assistance   Assess pain using appropriate pain scale   Administer analgesics based on type and severity of pain and evaluate response   Implement non-pharmacological measures as appropriate and evaluate response  Note: Pt managing pain per MAR.

## 2024-05-09 NOTE — OP NOTE
[address] [address]                            OPERATIVE REPORT      PATIENT NAME: IRVING GONZALEZ              : 1955  MED REC NO: [id number]                      ROOM: OR  ACCOUNT NO: [id number]                       ADMIT DATE: 2024  PROVIDER: Brian Sutherland MD      DATE OF PROCEDURE:      SURGEON:  Brian Sutherland MD    PREOPERATIVE DIAGNOSIS:  Tricompartmental osteoarthritis, left knee with contracture, medial collateral ligament and various malalignment.    POSTOPERATIVE DIAGNOSIS:  Tricompartmental osteoarthritis, left knee with contracture, medial collateral ligament and various malalignment.    OPERATIVE PROCEDURE:  Left total knee joint replacement with [employer] II cruciate ligament sacrificing, #7 femur, #6 tibia, 14 mm polyethylene insert with 35 x 9 mm patella insert (added level of difficulty due to medial collateral ligament reattachment and repair).    FIRST ASSISTANT:  JAVIER Edwards    SECOND ASSISTANT:  ANNITA Mckinnon MD    ANESTHESIA:  Spinal with adductor block and local knee block or with [employer].    PREOPERATIVE INDICATIONS:  This patient understood the associated risks and benefits and consented to the operative procedure having advancing symptoms with all activities of daily living and demonstrated evidences that were shown at surgery of bone-to-bone changes both in the tibiofemoral compartment medially and the patellofemoral compartment.  The operative procedure went very well.  There was complete contracture of the medial collateral ligament that prevented a normal gap.  Reportedly, the medial collateral ligament did have to be reattached, saving the posterior one-half that was tight in extension; however, the entire anterior one-half did have to be incised and reattached to allow for the gap to occur in flexion and extension.  This procedure went well and as would be dictated,

## 2024-05-09 NOTE — PLAN OF CARE
Problem: Discharge Planning  Goal: Discharge to home or other facility with appropriate resources  Outcome: Progressing   Pt involved in discharge planning. Barriers to discharge discussed with patient. Discharge learning needs identified. Discuss with patient any additional needed resources and transportation plans. Case management following plan of care.    Problem: Safety - Adult  Goal: Free from fall injury  5/9/2024 0759 by Adilene Gilbert, RN  Outcome: Progressing   All fall precautions in place. Bed locked and in lowest position with alarm on. Overbed table and personal belonings within reach. Call light within reach and patient instructed to use call light for assistance. Non-skid socks on.    Problem: Pain  Goal: Verbalizes/displays adequate comfort level or baseline comfort level  5/9/2024 0759 by Adilene Gilbert, RN  Outcome: Progressing   Pt endorsing pain to L knee. Being treated with PRN pain medication, rest, and frequent repositioning with pillow support for comfort and pressure relief. Pt reports some relief from pain with above interventions.

## 2024-05-09 NOTE — CARE COORDINATION
CM Note: CM met with pt and pt's wife in person. Pt and wife [place] out of state and will be staying at their son's house in [place] during the pt's recovery. Pt has OP PT scheduled to start today. CM delivered Rolling Walker to bedside. Pt's wife will provide transportation home and report no other CM needs for discharge to son's home today.  Electronically signed by SALAZAR Esparza on 5/9/2024 at 11:41 AM  [phone]

## 2024-05-09 NOTE — PROGRESS NOTES
4 Eyes Skin Assessment     NAME:  Galindo Dickson  YOB: 1955  MEDICAL RECORD NUMBER:  [id number]    The patient is being assessed for  Post-Op Surgical    I agree that at least one RN has performed a thorough Head to Toe Skin Assessment on the patient. ALL assessment sites listed below have been assessed.      Areas assessed by both nurses:    Head, Face, Ears, Shoulders, Back, Chest, Arms, Elbows, Hands, Sacrum. Buttock, Coccyx, Ischium, Legs. Feet and Heels, and Under Medical Devices   -blanchable redness to bottom and elbows        Does the Patient have a Wound? No noted wound(s)       Koko Prevention initiated by RN: Yes  Wound Care Orders initiated by RN: No    Pressure Injury (Stage 3,4, Unstageable, DTI, NWPT, and Complex wounds) if present, place Wound referral order by RN under : No    New Ostomies, if present place, Ostomy referral order under : No     Nurse 1 eSignature: Electronically signed by Tomasa Sterling RN on 5/9/24 at 1:21 AM EDT    **SHARE this note so that the co-signing nurse can place an eSignature**    Nurse 2 eSignature: Electronically signed by Oriana Ba RN on 5/9/24 at 2:22 AM EDT  
4/30/2024 1048 AM:   PRESURGICAL BATHING INSTRUCTIONS  The [facility] takes many steps to prevent infections during surgery. One way is to provide   you with 4% Chlorhexidine Gluconate (CHG), a special antiseptic soap to wash your skin prior to   surgery. By thoroughly washing your skin, you can reduce the number of germs and help us   prevent an infection. Skin prep is a very important part of getting you ready for surgery. Please   follow the instructions listed below. Do NOT use if you have had an allergic reaction to CHG   previously.   Common Brand names:  Betasept, Hibiclens, Hibistat, Exidine, BioScrub, Rebecca-Hex, Peridex, Clorostat      Showering Steps 5 Days Before Your Procedure:  Wash your hair, face and body using your regular soap and shampoo.    Rinse your hair and body thoroughly to remove any soap or shampoo residue.  Turn the water off to prevent rinsing the antiseptic soap (CHG) off too soon.    Wash the body gently for 5 minutes using a clean washcloth wet down with the CHG soap on it.    Apply the Chlorhexidine Gluconate (CHG) soap to your entire body only from the neck down.   Do not use on your face, eyes, ears, hair or genital area to avoid permanent injury to those areas.  Do not scrub the skin too hard. Wash thoroughly paying special attention to the area   where the surgery or procedure will be done.   Do not wash with regular soap once CHG is used.   Turn the water back on and rinse the body off thoroughly.  Pat yourself dry with a clean fresh towel after each shower for 5 days.   Put on clean clothes after each shower for the 5 days.  Do not put on lotions or powders after bathing.    If any kind of rash appears, stop use and contact your surgeon    A bottle of Chlorhexidine Gluconate CHG) can be purchased at most local pharmacy chain stores.   The soap may come in a liquid form, wipes or scrub brush applicator.  Any form is fine.  Remember   to use for 5 days prior to your surgery. 
4/30/2024 1109 AM:  PRESURGICAL BATHING INSTRUCTIONS  The [facility] takes many steps to prevent infections during surgery. One way is to provide   you with 4% Chlorhexidine Gluconate (CHG), a special antiseptic soap to wash your skin prior to   surgery. By thoroughly washing your skin, you can reduce the number of germs and help us   prevent an infection. Skin prep is a very important part of getting you ready for surgery. Please   follow the instructions listed below. Do NOT use if you have had an allergic reaction to CHG   previously.   Common Brand names:  Betasept, Hibiclens, Hibistat, Exidine, BioScrub, Rebecca-Hex, Peridex, Clorostat      Showering Steps 5 Days Before Your Procedure:  Wash your hair, face and body using your regular soap and shampoo.    Rinse your hair and body thoroughly to remove any soap or shampoo residue.  Turn the water off to prevent rinsing the antiseptic soap (CHG) off too soon.    Wash the body gently for 5 minutes using a clean washcloth wet down with the CHG soap on it.    Apply the Chlorhexidine Gluconate (CHG) soap to your entire body only from the neck down.   Do not use on your face, eyes, ears, hair or genital area to avoid permanent injury to those areas.  Do not scrub the skin too hard. Wash thoroughly paying special attention to the area   where the surgery or procedure will be done.   Do not wash with regular soap once CHG is used.   Turn the water back on and rinse the body off thoroughly.  Pat yourself dry with a clean fresh towel after each shower for 5 days.   Put on clean clothes after each shower for the 5 days.  Do not put on lotions or powders after bathing.    If any kind of rash appears, stop use and contact your surgeon    A bottle of Chlorhexidine Gluconate CHG) can be purchased at most local pharmacy chain stores.   The soap may come in a liquid form, wipes or scrub brush applicator.  Any form is fine.  Remember   to use for 5 days prior to your surgery. 
4/30/2024 1110 AM:    PT COMING FROM [place] STAYING IN [facility] AFTER PROCEDURE WITH WIFE/TS    H&P IN EPIC NOTES 4/11/24, LABS AND EKG IN EPIC 4/11/24/TS  
4/30/2024 1110 AM:  [facility] Joint video emailed FROM OFFICE & reviewed to patient by THIS NURSE. DENIES ANY QUESTIONS. Hibiclens instructions reviewed to use x 5 days preop.  LORRAINE screening done. TJ book, IS instructions, TJ video link, and fall contract placed on chart for DOS/TS.    
4/30/2024 1117 AM:    [facility] Same Day Readiness Screen  PAT Questionnaire    Does patient have at least one day of 24 hr assist of capable caregiver at d/c?  [x] Yes = 0  [] No = 2  WIFE AND PT COMING FROM [place]. STAYING IN [facility]    Was patient using an assistive device to walk prior to surgery?  [x] No = 0  [] Yes = 1    How many steps do you have to get to the floor where you plan to initially sleep and use the restroom? (At least 1/2 bath)  [x] 0-2 steps= 0 [] 3+ steps = 1    Has patient fallen in the last 3 months? If yes, how many times?  [x] 0 falls = 0 [] 1 fall = 1     [] 2+ falls = 2    Does patient have a hx of post-op nausea/vomiting?  [x] No = 0 [] Yes = 2    Other Factors    Age  [x] <70 = 0 [] 71-79 = 1  [] 80+ = 2    BMI  [x] <30 = 0       []31-39 = 1    [] >40 = 2    Co-morbidities  [] 0 = 0           [x] 1-2 = 1       [] 3+ = 2    Sleep apnea  [x] No = 0         [] Yes = 1    Hx of prolonged emergence from general anesthesia  [x] No = 0         [] Yes = 1      Score: 1      Interpretation:  Red (10-16): Low probability of safe same day discharge  Yellow (6-9): Moderate probability of safe same day discharge  Green (0-5): High probability of safe same day discharge    Score completed by:     
Dinner tray ordered at 1850 per patient's specific request. Dinner tray arrived at 1906.   
PACU Transfer to Floor Note    Procedure(s):  LEFT TOTAL KNEE  ARTHROPLASTY    Current Allergies: Metronidazole, Naproxen, Atorvastatin, Codeine, Flagyl [metronidazole], and Lipitor    Pt meets criteria as per Angel Score and ASPAN Standards to transfer to next phase of care.     No results for input(s): \"POCGLU\" in the last 72 hours.    Vitals:    05/08/24 2100   BP: 112/74   Pulse: 68   Resp: 13   Temp: 98 °F (36.7 °C)   SpO2: 100%     Vitals within 20% of pt's admission vitals as per ANGEL SCORE    SpO2: 100 %    O2 Flow Rate (L/min): 2 L/min      Intake/Output Summary (Last 24 hours) at 5/8/2024 2133  Last data filed at 5/8/2024 2100  Gross per 24 hour   Intake 2310 ml   Output 200 ml   Net 2110 ml       Pain assessment:  present - adequately treated    Pain Level: 4    Patient was assessed for alterations to skin integrity. There were not alterations observed.    Is patient incontinent: yes, pt requested to wait to get to his room to change his attends    Handoff report given at bedside.   Family updated and directed to pt room  All belongings sent with patient to inpatient room.     5/8/2024 9:33 PM  
Patient admitted to PACU #16 from OR per stretcher at 1351 s/p LEFT TOTAL KNEE ARTHROPLASTY - Left. Report received at bedside in PACU per CRNA and OR nurse. Patient received a spinal anesthesia along with an adductor canal nerve block. Patient was reported to be hypotensive in the OR which he received treatment for with no complications. Patient connected to PACU monitoring equipment. IVF's infusing with site unremarkable. Patient arrived to PACU responsive but not fully wakeful from anesthesia with complaints of pain which he was given 0.5 mg dilaudid per CRNA, see anesthesia record. LLE surgical dressing with DSD, Ace Wrap and a knee immobilizer. No further changes. Will continue to monitor.       
Patient discharged home with spouse. IV removed and all belongings gathered. Patient educated on home medications and follow ups - all questions answered. Patient transported to [facility] for discharge.    Electronically signed by Adilene Gilbert RN on 5/9/2024 at 12:37 PM   
Patient doesn't feel comfortable going to his son's house d/t all the steps. PT/OT aware.    
Patient has arrived to room 5525. Report received from OSVALDO Mayes at bedside in PACU. Patient oriented to room and call light. Fall precautions in place. 4 eyes skin assessment completed with second RN. Plan of care continues.   
Patient is alert and oriented x4. VSS on room air. Pt voiding adequately via urinal. Pt tolerating standing at bedside with gait belt and walker well. Pt tolerating PO intake well. PRN medication administered per MAR. Knee immobilizer in place. No side effects noted. Call light within reach. Fall precautions in place. Plan of care continues.  
Patient placed in clinical discharge waiting for an inpatient room assignment starting at 1700.   
Patient's wife at bedside in PACU at 1850 visiting with patient and provided inpatient room number that still needs to get cleaned.   
Physical Therapy  Daily Treatment Note    Discharge Recommendation:  24 hour supervision/assist & OP PT  Equipment Needs:  Rolling walker   Other: Walker was delivered during PT session. Therapist adjusted walker for pt's height.     Assessment:  Pt needing frequent seated rest breaks due to fatigue, and occasional nausea. Good effort and participation throughout session despite this and despite L knee pain. Steady gait with rolling walker. Did well on stairs with unilateral railing and with B crutches. Pt staying at son's house with family at D/C. Would benefit from initial 24 hour supervision/assist (wife will provide this), use of rolling walker for ambulation and continued PT. Pt was issued a rolling walker this admission and already owns crutches.     Chart Reviewed: Yes     Other position/activity restrictions: FWBAT, came out of OR with KI on;  per damian with MD 5/8 after eval - \"can come out of the immobilizer as needed for PT, bathing and ROM. If they had a block they can discontinue it once the block wears off\"   Additional Pertinent Hx: 68 y.o. male to OR 5/8 for L TKR; PMHx: prostate CA, appendix CA, COPD      Diagnosis: s/p L TKR   Treatment Diagnosis: impaired gait and transfers s/p L TKR    Subjective: Pt in bed initially. Wife present.   Some nausea during session. RN addressed with meds while pt seated EOB.  Anticipate D/C late AM/early PM. Has a PT appointment across the street today.   Plans to stay with son here in town during early recover process. Has 2 steps without railing to get into house. Flight with railing to bedroom. Owns crutches.     Pain: 6/10 at rest, 8/10 with activity. L knee. Ice packs to area after session. RN aware and has been addressing with meds.     Objective:    Exercises  10 reps B ankle pumps, quad sets, L hip abd/add (min assist)    Bed mobility  Supine to sit: SBA, HOB flat without railing  Scooting: SBA to EOB  Sit to Supine: SBA, HOB flat without railing. 
Report given to OSVALDO Randolph receiving patient on 5 tower in PACU at 2105 with all information provided including all medications given while in PACU along with patient had a spinal anesthesia and an adductor canal block. No further questions. Patient with complaints of increased pain since oxycodone 10 mg given at 2013. Dilaudid will be given prior to patient leaving PACU with receiving RN aware.   
Time out called at 0901 for the block. Pt tolerated the block to the left knee. Vitals stable and see flowheets. Patient stays with a family post op and pt may opt to stay in the hospital overnight. The spouse may want to talk to the surgeon when they come over.   
-- -- 78 17 93 %   05/08/24 1400 95/78 -- -- 74 14 95 %   05/08/24 1355 98/82 -- -- 80 10 96 %   05/08/24 1351 97/75 97.8 °F (36.6 °C) Temporal 78 13 95 %       General: alert, appears stated age, and cooperative   Wound: Postop dressing in place   Motion: Painless Range of Motion in affected extremity   N/V Calf soft and nontender, 2+ dorsal pedal pulse   DVT Exam: No evidence of DVT seen on physical exam.       Data Review  CBC:   Lab Results   Component Value Date/Time    WBC 6.5 04/11/2024 03:39 PM    RBC 4.34 04/11/2024 03:39 PM    RBC 4.53 03/23/2022 08:57 AM    HGB 13.8 04/11/2024 03:39 PM    HCT 39.7 04/11/2024 03:39 PM     04/11/2024 03:39 PM           Assessment:     POD 1: Left total knee arthroplasty with MCL reconstruction    Plan:     Continue pain control regimen  DVT prophylaxis  PT/OT as tolerated  Maintain knee immobilizer unless bathing, changing or working with therapy for at least 3 weeks  Weightbearing as tolerated with knee immobilizer and assistive device   Home exercise program  Discharge planning today          Shai Nuñez PA-C    Physician Assistant - Certified  Orthopedic Surgery   [facility]    05/09/24 9:32 AM        This dictation was performed with a verbal recognition program (DRAGON) and it was checked for errors.  It is possible that there are still dictated errors within this office note.  If so, please bring any errors to my attention for an addendum.  All efforts were made to ensure that this office note is accurate.    
y.o. M to OR 5/8 for L TKR.      PMH: COPD, Prostate CA, Hyperlipidemia, Appy.  Referring Practitioner: Luis Mckinnon MD  Diagnosis: Primary OA L Knee  Subjective  Subjective: In bed agreeable to session eval in PACU     Social/Functional History  Social/Functional History  Lives With: Spouse  Type of Home: House (Planning to stay at sons house for a few months for recovery. Pt and his wife live in [place].)  Home Layout: Two level, Bed/Bath upstairs, 1/2 bath on main level, Able to Live on Main level with bedroom/bathroom  Home Access: Stairs to enter without rails  Entrance Stairs - Number of Steps: 2  Bathroom Shower/Tub: Tub/Shower unit  Bathroom Toilet: Standard  Bathroom Equipment: None  Home Equipment: Crutches  Has the patient had two or more falls in the past year or any fall with injury in the past year?: No  ADL Assistance: Independent  Homemaking Assistance: Independent  Ambulation Assistance: Independent  Transfer Assistance: Independent  Active : Yes  Occupation: Retired       Objective           Safety Devices  Type of Devices:  (PACU stretcher, RN aware)  Bed Mobility Training  Bed Mobility Training: Yes  Supine to Sit: Stand-by assistance  Sit to Supine: Stand-by assistance  Balance  Sitting: Intact  Standing: With support  Transfer Training  Transfer Training: Yes  Overall Level of Assistance: Contact-guard assistance  Sit to Stand: Contact-guard assistance  Stand to Sit: Contact-guard assistance  Toilet Transfer: Contact-guard assistance  Gait  Gait Training: Yes  Overall Level of Assistance: Contact-guard assistance  Assistive Device: Walker, rolling;Gait belt     AROM: Generally decreased, functional  Strength: Generally decreased, functional  ADL  LE Dressing: Minimal assistance  Toileting: Contact guard assistance;Minimal assistance  Functional Mobility: Contact guard assistance  Skin Care: Bath wipes              Vision  Vision: Within Functional Limits  Hearing  Hearing: Within functional 
Term Goal 1: Fx transfers with SPVN - not met  Short Term Goal 2: UB/LB dressing with SPVN - not met  Short Term Goal 3: toileting with SPVN - not met    AM-PAC - ADL  AM-PAC Daily Activity - Inpatient   How much help is needed for putting on and taking off regular lower body clothing?: A Little  How much help is needed for bathing (which includes washing, rinsing, drying)?: A Little  How much help is needed for toileting (which includes using toilet, bedpan, or urinal)?: A Little  How much help is needed for putting on and taking off regular upper body clothing?: A Little  How much help is needed for taking care of personal grooming?: A Little  How much help for eating meals?: None  AM-PAC Inpatient Daily Activity Raw Score: 19  AM-PAC Inpatient ADL T-Scale Score : 40.22  ADL Inpatient CMS 0-100% Score: 42.8  ADL Inpatient CMS G-Code Modifier : CK    Therapy Time   Individual Concurrent Group Co-treatment   Time In 0910         Time Out 0935         Minutes 25             Timed Code Treatment Minutes:   25 mins    Total Treatment Minutes:  25 mins      Marii Nichole OTR/L     
procedure, any hair that needs to be removed near the surgical site will be 'clipped' by a healthcare worker using a special clipper designed to avoid skin irritation.    8. Dentures, glasses, or contacts will need to be removed before your procedure. Bring cases for your glasses, contacts, dentures, or hearing aids to protect them while you are in surgery.      9. If you use a CPAP, please bring it with you on the day of your procedure.    10. If you use oxygen at home, please bring your oxygen tank with you to hospital..     11. We recommend that valuable personal belongings such as cash, cell phones, e-tablets, or jewelry, be left at home during your stay. The hospital will not be responsible for valuables that are not secured in the hospital safe. However, if your insurance requires a co-pay, you may want to bring a method of payment, i.e., Check or credit card, if you wish to pay your co-pay the day of surgery.      12. If you are to stay overnight, you may bring a bag with personal items. Please have any large items you may need brought in by your family after your arrival to your hospital room.    13. If you have a Living Will or Durable Power of , please bring a copy on the day of your procedure.     How we keep you safe and work to prevent surgical site infections:   1. Health care workers should always check your ID bracelet to verify your name and birth date. You will be asked many times to state your name, date of birth, and allergies.    2. Health care workers should always clean their hands with soap or alcohol gel before providing care to you. It is okay to ask anyone if they cleaned their hands before they touch you.    3. You will be actively involved in verifying the type of procedure you are having and ensuring the correct surgical site. This will be confirmed multiple times prior to your procedure. Do NOT david your surgery site UNLESS instructed to by your surgeon.     4. When you are in 
amb 150 ft with RW and supervision  Short Term Goal 4: Pt will negotiate 2 steps with cxs and SBA  Short Term Goal 5: Pt will negotiate 1 flight of stairs with rail + LRAD and SBA  Patient Goals   Patient Goals : d/c to son's home with assist from family       Education  Patient Education  Education Given To: Patient  Education Provided: Role of Therapy;Plan of Care;Transfer Training  Education Provided Comments: stair training, amb with RW, WBAT  Education Method: Demonstration;Verbal  Barriers to Learning: None  Education Outcome: Demonstrated understanding      Therapy Time   Individual Concurrent Group Co-treatment   Time In 1505         Time Out 1550         Minutes 45                 Timed Code Treatment Minutes:  30    Total Treatment Minutes:  45    If patient is discharged prior to next treatment, this note will serve as the discharge summary.  Haley Hough, PT, DPT  [id number]

## 2024-05-09 NOTE — DISCHARGE SUMMARY
Subjective:     Post-Operative Day #1 status post left Total Knee Arthroplasty  Systemic or Specific Complaints:No Complaints    Objective:   Allergies:  Metronidazole, Naproxen, Atorvastatin, Codeine, Flagyl [metronidazole], and Lipitor    Scheduled Meds:   pravastatin  40 mg Oral Daily    tamsulosin  0.4 mg Oral Daily    sodium chloride flush  5-40 mL IntraVENous 2 times per day    acetaminophen  1,000 mg Oral 3 times per day    bisacodyl  5 mg Oral Daily    aspirin  81 mg Oral BID       PRN Meds:  sodium chloride flush, sodium chloride, oxyCODONE **OR** oxyCODONE, ondansetron **OR** ondansetron, morphine    Vital Signs:  Vitals:    24 0459   BP:    Pulse:    Resp: 16   Temp:    SpO2:      Tmax:  Temp (24hrs), Av.9 °F (36.6 °C), Min:97.1 °F (36.2 °C), Max:98.6 °F (37 °C)       Physical Exam:  General: alert, appears stated age, and cooperative   Wound: Wound clean and dry no evidence of infection.  Dressing dry.   DVT Exam: No evidence of DVT seen on physical exam.   Lower Extr: Moving foot and ankle. Good distal pulses.     Data Review:    LABS:  Hemoglobin/Hematocrit:   Lab Results   Component Value Date/Time    HGB 13.8 2024 03:39 PM    HCT 39.7 2024 03:39 PM     PT/INR:   Lab Results   Component Value Date/Time    PROTIME 13.9 2024 03:39 PM    INR 1.05 2024 03:39 PM       Assessment (Medical Decision Making):     POD #1 status post left Total Knee Arthroplasty. Doing well postoperatively.     Plan (Medical Decision Making):      1.  Continue current post-op course   2.  Currently monitoring H/H post-op  3.  Continue deep venous thrombosis prophylaxis:   Early mobilization   Foot elevation   Hydration   JOSEPH Hose in place (graduated compressive stockings)   Intermittent pneumatic compression device (IPC) in place   Aspirin for 4 weeks  4.  Continue physical therapy  5.  Continue pain control  6.  Encourage incentive spirometry q 1h while awake  7.  Ice to knee.  8. Discharge

## 2024-05-09 NOTE — FLOWSHEET NOTE
[facility]- Outpatient Rehabilitation and Therapy [address], [address], [place] [address] office: [phone] fax: [phone]      Physical Therapy: TREATMENT/PROGRESS NOTE   Patient: Galindo Dickson (68 y.o. male)   Examination Date: 2024   :  1955 MRN: [id number]   Visit #: 2   Insurance Allowable Auth Needed   30 []Yes    [x]No    Insurance: Payor: MEDICARE / Plan: MEDICARE PART A AND B / Product Type: *No Product type* /   Insurance ID: [id number] - (Medicare)  Secondary Insurance (if applicable): [facility]   Treatment Diagnosis:     ICD-10-CM    1. Left knee pain, unspecified chronicity  M25.562       2. Weakness  R53.1       3. Gait abnormality  R26.9          Medical Diagnosis:  Left knee pain [M25.562]   Referring Physician: Brian Sutherland MD  PCP: Tamiko Conteh MD       Plan of care signed (Y/N): Y    Date of Patient follow up with Physician: Patient was seen in conjunction with Dr. Brian Sutherland to establish initial/subsequent patient care plan.    24: seen by JAVIER Corona - had to do a full repair of MCL with anchors and sutures along the length of the MCL; fig-4 ROM to 90 deg max for the first 2 weeks at least; thigh discomfort is due to tourniquet; okay to take tylenol between doses of oxycodone, tylenol PM at night or 2 tabs of oxycodone at bedtime to help get more longevity and better sleep     Progress Report/POC: NO  POC update due: (10 visits /OR AUTH LIMITS, whichever is less)  2024                                             Precautions/ Contra-indications:           Latex allergy:  NO  Pacemaker:    NO  Contraindications for Manipulation: None  Date of Surgery: L TKR w/ MCL repair 24  Other:    Red Flags:  None    C-SSRS Triggered by Intake questionnaire:   Patient answered 'NO' to both behavioral questions on intake.  No further screening warranted    Preferred Language for Healthcare:   [x] English       []

## 2024-05-10 ENCOUNTER — CARE COORDINATION (OUTPATIENT)
Dept: CASE MANAGEMENT | Age: 69
End: 2024-05-10

## 2024-05-10 DIAGNOSIS — C61 PRIMARY MALIGNANT NEOPLASM OF PROSTATE (HCC): Primary | ICD-10-CM

## 2024-05-10 NOTE — CARE COORDINATION
palpitations / flutters, fatigue, and weakness.  -Denies issues with fluid intake, appetite, and urination.   -CTC and Pt reviewed meds and CTC completed the 1111f  -HFU with PCP - declined / not needed.  Msg to pcp pool to be sent.  -HFU w/ortho surgeon, Dr Sutherland, declined assistance and will call and schedule.  -Physical Therapy - outpatient - 5/9 - 1ST appt - COMPLETED - continues with OP PT as recommended.       Care Transition Nurse reviewed discharge instructions, medical action plan, and red flags with patient who verbalized understanding. The patient was given an opportunity to ask questions and does not have any further questions or concerns at this time. Were discharge instructions available to patient? Yes. Reviewed appropriate site of care based on symptoms and resources available to patient including: PCP  Specialist  After hours contact number-   Benefits related nurse triage line  Urgent care clinics  When to call 911  MyChart Messaging  [employer] Help Desk for reactivation or family member permission [phone]. The patient agrees to contact the PCP office for questions related to their healthcare.     Advance Care Planning:   Does patient have an Advance Directive: not on file.    Medication reconciliation was performed with patient, who verbalizes understanding of administration of home medications. Medications reviewed, 1111F entered: yes    Was patient discharged with a pulse oximeter? no    Non-face-to-face services provided:  Obtained and reviewed discharge summary and/or continuity of care documents  Education of patient/family/caregiver/guardian to support self-management-   Assessment and support for treatment adherence and medication management-     Offered patient enrollment in the Remote Patient Monitoring (RPM) program for in-home monitoring: fu call.    Care Transitions 24 Hour Call    Do you have a copy of your discharge instructions?: Yes  Do you have all of your prescriptions and

## 2024-05-13 ENCOUNTER — HOSPITAL ENCOUNTER (OUTPATIENT)
Dept: PHYSICAL THERAPY | Age: 69
Setting detail: THERAPIES SERIES
Discharge: HOME | End: 2024-05-13
Payer: MEDICARE

## 2024-05-13 PROCEDURE — 97016 VASOPNEUMATIC DEVICE THERAPY: CPT | Performed by: PHYSICAL THERAPIST

## 2024-05-13 PROCEDURE — 97110 THERAPEUTIC EXERCISES: CPT | Performed by: PHYSICAL THERAPIST

## 2024-05-13 NOTE — FLOWSHEET NOTE
Other comments   Stretching     Hamstring 3 x 30\" Towel Pull 3 x 30\" Inclined Calf     Hip Flexion     ITB     Groin     Quad                    SLR     Supine NV    Abduction     Adduction     Prone     SLR+          Isometrics     Quad sets 10 x 10\" Towel roll under knee   Ball squeeze 10 x 10\" KE        Patellar Glides     Medial     Superior     Inferior          ROM     Sheet Pulls 10 x 10\" Fig-4 to 90 max x 2 wks   Hang Weights     Passive 5' Manual fig-4 EOT   Active     Weight Shift     Ankle Pumps 30x Blue TB                  CKC     Calf raises     Wall sits     Step ups     1 leg stand     Squatting     CC TKE     Balance     bridges          PRE     Extension  RANGE:   Flexion  RANGE:        Quantum machines     Leg press      Leg extension     Leg curl          Manual interventions          Patient Education 5'          Patient education:  full post-op education including surgical restrictions, HEP, icing regiment, wound/bandage care, TEDs & immobilizer brace. Wife present. 5/9/24    Modalities:    Vasopneumatic Compression (Game Ready): Applied to Knee Left for significant edema, swelling, pain control for 15 minutes.  Relevant ICD-10 R60.1 Generalized edema.   Girth Left Right Post Vaso   Knee: Midpatella 41.9 40.2 41.5   Knee: Infrapatellar 41.8 40.3 41.5   Knee: 5 cm above 41.5 40.7            Education/Home Exercise Program: Patient HEP program created electronically.  Refer to [facility] access code:    Access Code: [email or web address]  URL: [email or web address]  Date: 05/07/2024  Prepared by: Jennifer Angulo    Exercises  - Long Sitting Quad Set  - 6 x daily - 7 x weekly - 1 sets - 10 reps - 10 hold  - Supine Ankle Pumps  - 6 x daily - 7 x weekly - 3 sets - 10 reps  - Seated Table Hamstring Stretch  - 3 x daily - 7 x weekly - 1 sets - 5 reps - 30 hold  - Long Sitting Calf Stretch with Strap  - 3 x daily - 7 x weekly - 1 sets - 5 reps - 30 hold  - Long Sitting Isometric Hip Adduction and Extension with

## 2024-05-14 ENCOUNTER — CARE COORDINATION (OUTPATIENT)
Dept: CASE MANAGEMENT | Age: 69
End: 2024-05-14

## 2024-05-14 ENCOUNTER — TELEPHONE (OUTPATIENT)
Dept: ORTHOPEDIC SURGERY | Age: 69
End: 2024-05-14

## 2024-05-14 DIAGNOSIS — Z96.652 STATUS POST TOTAL LEFT KNEE REPLACEMENT NOT USING CEMENT: ICD-10-CM

## 2024-05-14 RX ORDER — OXYCODONE HYDROCHLORIDE 5 MG/1
5 TABLET ORAL EVERY 4 HOURS PRN
Qty: 30 TABLET | Refills: 0 | Status: SHIPPED | OUTPATIENT
Start: 2024-05-14 | End: 2024-05-19

## 2024-05-14 NOTE — TELEPHONE ENCOUNTER
1)    Prescription Refill     Medication Name:  oxyCODONE (ROXICODONE) 5 MG  Pharmacy: [facility] [id number] [address] - P [phone] - F [phone]   Patient Contact Number:  [phone]      2)     Appointment Request     Patient requesting earlier appointment: No  Appointment offered to patient: YES  Patient Contact Number: [phone]     Pt WOULD LIKE HIS FIRST PO TO BE ON 05/21/24 IF HE CAN BE WORKED INTO THE SCHEDULE TO SEE DR SINGLETON. PLEASE CALL TO ADVISE IF THERE IS A WAY FOR HIM TO BE SEEN FOR 1ST PO ON 05/21.

## 2024-05-14 NOTE — PROGRESS NOTES
[facility]  Knee Pain    Date:  2024    Name:  Galindo Dickson  Address:  [address] [place] [address]    :  1955      Age:   68 y.o.      Chief Complaint: Left knee pain    History of Present Illness:  Galindo Dickson is a 68 y.o. male s/p a left TKR.  At this time the patient is receiving pain medicine from the orthopedic surgery team (Dr. Brian Sutherland and myself) for postop surgical pain. Patient was originally attempted to be treated on Percocet 5-325 every 6 hours. Patient was still experiencing some postoperative pain and therefore the medication frequency was increased from every 6 hours to every 4 hours.       Pain medication is being written on a weekly basis after a thorough discussion with the patient and reassessment of the patient's condition to verify that the patient is receiving the lowest possible dose to manage the pain.  I spoke with the patient today (24) and the patient states that they are doing much better with pain. They have been able to make further strides with home therapy and notes that their swelling has decreased. The patient feels that their pain is decreasing and is noticing improvements daily.     Patient denies any signs or symptoms of drowsiness, loss of balance, euphoric sensation, or other symptoms of overuse or misuse of narcotic pain medication.  The patient is being written pain medication for this major orthopedic surgery.  Patient was educated to on the appropriate utilization of narcotic pain medication.  They were given appropriate precautions and restrictions that they should strictly adhere to while taking narcotic pain medication including not conducting any activity that could put them or others at risk of harm or death.  Patient was understanding of all instructions and agreeable with this plan.      Orders Placed This Encounter   Medications    oxyCODONE (ROXICODONE) 5 MG immediate release

## 2024-05-14 NOTE — CARE COORDINATION
Care Transitions Follow Up Call    Patient Current Location:  Home:  [facility] [address]    Care Transition Nurse contacted the patient by telephone to follow up after admission on .  Verified name and  with patient as identifiers.    Patient: Galindo Dickson  Patient : 1955   MRN: [id number]   Reason for Admission: LEFT TKR  Discharge Date: 24 RARS: No data recorded    Needs to be reviewed by the provider   Additional needs identified to be addressed with provider: No         Method of communication with provider: none.    SUMMARY  CTN spoke to the pt who reports the following:       -Lives in [facility] and here in [place] for knee replacement only.  Will be returning home around 2024.  -Left TKR:  c/o pain he rates 5/10 while sitting and some swelling.  Taking pain med as prescribed and icing and elevating as recommended.  Denies numbness / tingling to right leg / foot / toes.  Will continue to monitor and f/u with physician as recommended.   -C/O feeling tired and weak and will continue to monitor and f/u with physician if persist or worsens.  Continues but \"a little better\".    -Using rolling walker to ambulate.  Will continue to use DME to ambulate to prevent fall / injury.   -LBM was on today and described as \"small and hard\".  Will increase Senna to BID and increase fluid intake.    -Denies fever, chills, nausea, vomiting, cough, congestion, SOB / DB, wheezing, chest pain, LE edema, feeling lightheaded / dizziness, heart palpitations / flutters, fatigue, and weakness.  -Denies issues with fluid intake, appetite, and urination.   -Confirms taking all meds as prescribed.  Denies any med changes.  -HFU with PCP - declined / not needed.  Msg to pcp pool to be sent 5/10  -HFU w/ortho surgeon, Dr Sutherland  -Physical Therapy - outpatient -  appt - COMPLETED - continues with OP PT as recommended.     Addressed changes since last

## 2024-05-15 ENCOUNTER — APPOINTMENT (OUTPATIENT)
Dept: PHYSICAL THERAPY | Age: 69
End: 2024-05-15
Payer: MEDICARE

## 2024-05-17 ENCOUNTER — HOSPITAL ENCOUNTER (OUTPATIENT)
Dept: PHYSICAL THERAPY | Age: 69
Setting detail: THERAPIES SERIES
Discharge: HOME | End: 2024-05-17
Payer: MEDICARE

## 2024-05-17 PROCEDURE — 97140 MANUAL THERAPY 1/> REGIONS: CPT | Performed by: PHYSICAL THERAPIST

## 2024-05-17 PROCEDURE — 97016 VASOPNEUMATIC DEVICE THERAPY: CPT | Performed by: PHYSICAL THERAPIST

## 2024-05-17 PROCEDURE — 97110 THERAPEUTIC EXERCISES: CPT | Performed by: PHYSICAL THERAPIST

## 2024-05-17 NOTE — FLOWSHEET NOTE
No      CHARGE CAPTURE     PT CHARGE GRID   CPT Code (TIMED) minutes # CPT Code (UNTIMED) #     Therex (05937)  30 2  EVAL:LOW (31053 - Typically 20 minutes face-to-face)     Neuromusc. Re-ed (15690)    Re-Eval (46228)     Manual (23205) 10 1  Estim Unattended (94989)     Ther. Act (01653)    Mech. Traction (18779)     Gait (14024)    Dry Needle 1-2 muscle (02659)     Aquatic Therex (41775)    Dry Needle 3+ muscle (05247)     Iontophoresis (16679)    VASO (26845) 1    Ultrasound (12037)    Group Therapy (42780)     Estim Attended (53988)    Canalith Repositioning (39292)     Other:    Other:    Total Timed Code Tx Minutes 40 3  1     Total Treatment Minutes 55   8:42 - 10:00    Charge Justification:  (26208) THERAPEUTIC EXERCISE - Provided verbal/tactile cueing for activities related to strengthening, flexibility, endurance, ROM performed to prevent loss of range of motion, maintain or improve muscular strength or increase flexibility, following either an injury or surgery.   (86957) HOME EXERCISE PROGRAM - Reviewed/Progressed HEP activities related to strengthening, flexibility, endurance, ROM performed to prevent loss of range of motion, maintain or improve muscular strength or increase flexibility, following either an injury or surgery.  (84213) MANUAL THERAPY -  Manual therapy techniques, 1 or more regions, each 15 minutes (Mobilization/manipulation, manual lymphatic drainage, manual traction) for the purpose of modulating pain, promoting relaxation,  increasing ROM, reducing/eliminating soft tissue swelling/inflammation/restriction, improving soft tissue extensibility and allowing for proper ROM for normal function with self care, mobility, lifting and ambulation  (93753) VASOPNEUMATIC  (13397) ADL - Provided self-care/home management training related to activities of daily living and compensatory training, and/or use of adaptive equipment for improvement with: ADLs and compensatory training, meal preparation,

## 2024-05-20 ENCOUNTER — CARE COORDINATION (OUTPATIENT)
Dept: CASE MANAGEMENT | Age: 69
End: 2024-05-20

## 2024-05-20 ENCOUNTER — HOSPITAL ENCOUNTER (OUTPATIENT)
Dept: PHYSICAL THERAPY | Age: 69
Setting detail: THERAPIES SERIES
Discharge: HOME | End: 2024-05-20
Payer: COMMERCIAL

## 2024-05-20 ENCOUNTER — TELEPHONE (OUTPATIENT)
Dept: ORTHOPEDIC SURGERY | Age: 69
End: 2024-05-20

## 2024-05-20 DIAGNOSIS — Z47.89 ORTHOPEDIC AFTERCARE: Primary | ICD-10-CM

## 2024-05-20 DIAGNOSIS — M25.462 PAIN AND SWELLING OF LEFT KNEE: Primary | ICD-10-CM

## 2024-05-20 DIAGNOSIS — Z96.652 S/P TKR (TOTAL KNEE REPLACEMENT) USING CEMENT, LEFT: Primary | ICD-10-CM

## 2024-05-20 DIAGNOSIS — M25.562 PAIN AND SWELLING OF LEFT KNEE: Primary | ICD-10-CM

## 2024-05-20 DIAGNOSIS — Z96.652 S/P TKR (TOTAL KNEE REPLACEMENT) NOT USING CEMENT, LEFT: ICD-10-CM

## 2024-05-20 PROCEDURE — 97110 THERAPEUTIC EXERCISES: CPT | Performed by: PHYSICAL THERAPIST

## 2024-05-20 PROCEDURE — 97140 MANUAL THERAPY 1/> REGIONS: CPT | Performed by: PHYSICAL THERAPIST

## 2024-05-20 PROCEDURE — 97016 VASOPNEUMATIC DEVICE THERAPY: CPT | Performed by: PHYSICAL THERAPIST

## 2024-05-20 RX ORDER — METHYLPREDNISOLONE 4 MG/1
TABLET ORAL
Qty: 1 KIT | Refills: 0 | Status: SHIPPED | OUTPATIENT
Start: 2024-05-20 | End: 2024-05-22 | Stop reason: ALTCHOICE

## 2024-05-20 RX ORDER — OXYCODONE HYDROCHLORIDE 5 MG/1
5 TABLET ORAL EVERY 4 HOURS PRN
Qty: 30 TABLET | Refills: 0 | Status: SHIPPED | OUTPATIENT
Start: 2024-05-20 | End: 2024-05-22

## 2024-05-20 RX ORDER — OXYCODONE HYDROCHLORIDE 5 MG/1
5 TABLET ORAL EVERY 6 HOURS PRN
Qty: 28 TABLET | Refills: 0 | Status: SHIPPED | OUTPATIENT
Start: 2024-05-20 | End: 2024-05-24 | Stop reason: SDUPTHER

## 2024-05-20 NOTE — TELEPHONE ENCOUNTER
Prescription Refill     Medication Name:  OXYCODONE AND ANTI INFLAMMATORY     Pharmacy: [facility] [id number] - [place], [place] - [address] - P [phone] - F [phone]   Patient Contact Number:  [phone]     Pt REQUESTING PAIN MED AND AN ANTI INFLAMMATORY WAS SUPPOSED TO HAVE BEEN SENT IN FOR HIM, AS WELL. PLEASE SEND ASAP.

## 2024-05-20 NOTE — CARE COORDINATION
Care Transitions Follow Up Call    Patient Current Location:  Home:  [facility] [address]    Care Transition Nurse contacted the patient by telephone to follow up after admission on .  Verified name and  with patient as identifiers.    Patient: Galindo Dickson  Patient : 1955   MRN: [id number]   Reason for Admission: left TKR  Discharge Date: 24 RARS: No data recorded    Needs to be reviewed by the provider   Additional needs identified to be addressed with provider: No         Method of communication with provider: none.    SUMMARY  CTN spoke to the pt who reports the following:       -Lives in [facility] and here in [place] for knee replacement only.  Will be returning home around 2024.  -Left TKR:   PT session completed today.  C/O pain to pain he rates 4/10 while sitting that increases with movement.  C/O swelling and continues with icing elevating.  Medrol Dose Destiny prescribed today.  Continues with pain med and denies numbness / tingling to right leg / foot / toes.  Will continue to monitor and f/u with physician as recommended.    -C/O feeling tired and weak RESOLVED   -Using rolling walker to ambulate.  Will continue to use DME to ambulate to prevent fall / injury.   -LBM was on today and denies issues at this time.  Will continue taking Senna as needed and increase fluid intake as needed.   -Denies fever, chills, nausea, vomiting, cough, congestion, SOB / DB, wheezing, chest pain, LE edema, feeling lightheaded / dizziness, heart palpitations / flutters, fatigue, and weakness.  -Denies issues with fluid intake, appetite, and urination.   -Confirms taking all meds as prescribed.  Medrol dose destiny prescribed today / swelling  -HFU with PCP - declined / not needed.  Msg to pcp pool to be sent 5/10  -HFU w/ortho surgeon, Dr Sutherland   -Physical Therapy - outpatient -  -  appt - COMPLETED - continues with OP PT as recommend     Addressed changes since last

## 2024-05-20 NOTE — FLOWSHEET NOTE
ROM, reducing/eliminating soft tissue swelling/inflammation/restriction, improving soft tissue extensibility and allowing for proper ROM for normal function with self care, mobility, lifting and ambulation  (14297) VASOPNEUMATIC  (78338) ADL - Provided self-care/home management training related to activities of daily living and compensatory training, and/or use of adaptive equipment for improvement with: ADLs and compensatory training, meal preparation, safety procedures and instruction in use of adaptive equipment, including bathing, grooming, dressing, personal hygiene, basic household cleaning and chores.       GOALS     GOALS:  Patient stated goal: Return to PLOF without restriction.  [] Progressing: [] Met: [] Not Met: [] Adjusted    Therapist goals for Patient:   Short Term Goals: To be achieved in: 1-2 visit(s)  1. Independent in HEP and progression per patient tolerance, in order to prevent re-injury.   [] Progressing: [] Met: [] Not Met: [] Adjusted  2. All patient questions regarding expectations for rehab following upcoming surgery are answered.   [] Progressing: [] Met: [] Not Met: [] Adjusted    Long Term Goals:   Long Term Goals: To be achieved in: 8 weeks  Disability index score of 50% or less for the WOMAC to assist with return top prior level of function.    Status: [] Progressing: [] Met: [] Not Met: [] Adjusted  Improve  knee PROM  Flexion to 115 degrees or  better to allow for proper joint functioning as indicated by patients functional deficits.  Status: [] Progressing: [] Met: [] Not Met: [] Adjusted  Pt to improve strength to show motor control/activation of quadriceps to facilitate functional mobility and ADLs.   Status: [] Progressing: [] Met: [] Not Met: [] Adjusted  Patient will return to walking around house without increased symptoms or restriction to work towards return to prior level of function.  Status: [] Progressing: [] Met: [] Not Met: [] Adjusted  Patient will increase LE

## 2024-05-21 ENCOUNTER — TELEPHONE (OUTPATIENT)
Dept: ORTHOPEDIC SURGERY | Age: 69
End: 2024-05-21

## 2024-05-21 ENCOUNTER — OFFICE VISIT (OUTPATIENT)
Dept: ORTHOPEDIC SURGERY | Age: 69
End: 2024-05-21

## 2024-05-21 VITALS — HEIGHT: 69 IN | BODY MASS INDEX: 26.51 KG/M2 | WEIGHT: 179 LBS

## 2024-05-21 DIAGNOSIS — Z00.00 ANNUAL PHYSICAL EXAM: ICD-10-CM

## 2024-05-21 DIAGNOSIS — Z96.652 S/P TOTAL KNEE ARTHROPLASTY, LEFT: Primary | ICD-10-CM

## 2024-05-21 RX ORDER — PRAVASTATIN SODIUM 40 MG
40 TABLET ORAL DAILY
Qty: 90 TABLET | Refills: 0 | Status: SHIPPED | OUTPATIENT
Start: 2024-05-21 | End: 2024-05-24 | Stop reason: SDUPTHER

## 2024-05-21 NOTE — TELEPHONE ENCOUNTER
Medication:   Requested Prescriptions     Pending Prescriptions Disp Refills    pravastatin (PRAVACHOL) 40 MG tablet 90 tablet 0     Sig: Take 1 tablet by mouth daily     Last Filled:  4/5/2024    Last appt: 4/11/2024   Next appt: Visit date not found    Last Lipid:   Lab Results   Component Value Date/Time    CHOL 231 10/20/2022 10:14 AM    TRIG 104 10/20/2022 10:14 AM    HDL 79 10/20/2022 10:14 AM    HDL 56 08/11/2010 10:18 AM

## 2024-05-21 NOTE — TELEPHONE ENCOUNTER
Pt called in for meds refill.Please contact Pt when completed.  pravastatin (PRAVACHOL) 40 MG tablet [[id number]]   [facility] [id number] - [place] - [address] - P [phone] - F [phone] [address] [place] [address] [place] [address]  Phone: [phone]  Fax: [phone]

## 2024-05-21 NOTE — TELEPHONE ENCOUNTER
CPT: 93991  AUTH: NPR PER [facility] WEBSITE  INSURANCE: [facility]  LOCATION [facility]  AREA OF SX LT KNEE  NOTE: DOC SCANNED

## 2024-05-21 NOTE — PROGRESS NOTES
inches above the heart with pillows supporting the joint underneath for swelling  Home exercises, flexion and extension program  Physical therapy including Manual Therapy, Strengthening, Stretching, Joint Mobilization, Gait Training, Neuromuscular Re-Education, and Modalities  Appropriate diet/weight management  [facility] 5/21/24    Diagnoses and treatments were reviewed with the patient today. Patient education material was provided. Questions were entertained and answered to the patient's satisfaction.      Follow up: Physical Therapy      ISamantha ATC am scribing for and in the presence of Dr. Brian Sutherland.   The physical examination was performed by Dr. Brian Sutherland.  All counseling during the appointment was performed between the patient and Dr. Brian Sutherland.     05/21/24 10:41 AM   Samantha Duque ATC    This dictation was performed with a verbal recognition program (DRAGON) and it was checked for errors.  It is possible that there are still dictated errors within this office note.  If so, please bring any errors to my attention for an addendum.  All efforts were made to ensure that this office note is accurate.    Supervising Physician Attestation:  I, Dr. Brian Sutherland, personally performed the services described in this documentation as above, and it is both accurate and complete and I agree with all pertinent clinical information.  I personally interviewed the patient and performed a physical examination and medical decision making. I discussed the patient's condition and treatment options and have  reviewed and agree with the past medical, family and social history unless otherwise noted. All of the patient's questions were answered. I personally supervised the Orthopedic and Sportsmedicine Fellow when when noted in the evaluation and treatment plan of the patient.      Board Certified Orthopaedic Surgeon   [facility]  President and Medical

## 2024-05-22 ENCOUNTER — TELEPHONE (OUTPATIENT)
Dept: ORTHOPEDIC SURGERY | Age: 69
End: 2024-05-22

## 2024-05-22 ENCOUNTER — PATIENT MESSAGE (OUTPATIENT)
Dept: PRIMARY CARE CLINIC | Age: 69
End: 2024-05-22

## 2024-05-22 ENCOUNTER — TELEPHONE (OUTPATIENT)
Dept: ONCOLOGY | Age: 69
End: 2024-05-22

## 2024-05-22 DIAGNOSIS — Z00.00 ANNUAL PHYSICAL EXAM: ICD-10-CM

## 2024-05-22 NOTE — TELEPHONE ENCOUNTER
Medication:   Requested Prescriptions     Pending Prescriptions Disp Refills    pravastatin (PRAVACHOL) 40 MG tablet 90 tablet 0     Sig: Take 1 tablet by mouth daily     Last Filled:  05/21/2024    Last appt: 4/11/2024   Next appt: Visit date not found    Last OARRS:       5/20/2024     9:58 AM   RX Monitoring   Acute Pain Prescriptions Not required given exclusionary diagnoses...   Periodic Controlled Substance Monitoring Possible medication side effects, risk of tolerance/dependence & alternative treatments discussed.   Send to [address]

## 2024-05-22 NOTE — TELEPHONE ENCOUNTER
I spoke with patients wife and let her know Mark Cruz is scheduled for Wed 5/29 at 7:30am with a 5:30am arrival time

## 2024-05-22 NOTE — TELEPHONE ENCOUNTER
General Question     Subject: PRE OP PHY  Patient and /or Facility Request: Galindo Dickson \"[place]\"   Contact Number: [phone]     PT CLLED TO SEE IF RAYSA CAN DO HIS PRE OP PHY FOR THE KNEE  MANIPULATION    PLEASE CLL PT TO ADV

## 2024-05-22 NOTE — TELEPHONE ENCOUNTER
From: Galindo Dickson  To: Dr. Tamiko Conteh  Sent: 5/22/2024 7:08 AM EDT  Subject: prescription refill     I need a refill of Pravistatin 40 mg. Please use [facility] on [address], [place], [place]. Delete prescription sent to [facility].

## 2024-05-24 ENCOUNTER — HOSPITAL ENCOUNTER (OUTPATIENT)
Dept: PHYSICAL THERAPY | Age: 69
Setting detail: THERAPIES SERIES
Discharge: HOME | End: 2024-05-24
Payer: COMMERCIAL

## 2024-05-24 DIAGNOSIS — Z47.89 ORTHOPEDIC AFTERCARE: ICD-10-CM

## 2024-05-24 PROCEDURE — 97140 MANUAL THERAPY 1/> REGIONS: CPT | Performed by: PHYSICAL THERAPIST

## 2024-05-24 PROCEDURE — 97016 VASOPNEUMATIC DEVICE THERAPY: CPT | Performed by: PHYSICAL THERAPIST

## 2024-05-24 PROCEDURE — 97110 THERAPEUTIC EXERCISES: CPT | Performed by: PHYSICAL THERAPIST

## 2024-05-24 RX ORDER — OXYCODONE HYDROCHLORIDE 5 MG/1
5 TABLET ORAL EVERY 4 HOURS PRN
Qty: 30 TABLET | Refills: 0 | Status: SHIPPED | OUTPATIENT
Start: 2024-05-25 | End: 2024-05-30

## 2024-05-24 RX ORDER — PRAVASTATIN SODIUM 40 MG
40 TABLET ORAL DAILY
Qty: 90 TABLET | Refills: 0 | Status: SHIPPED | OUTPATIENT
Start: 2024-05-24

## 2024-05-24 NOTE — FLOWSHEET NOTE
self-care activities.             Status: [] Progressing: [] Met: [] Not Met: [] Adjusted      TREATMENT PLAN     Frequency/Duration: 2x/week for 8 weeks for the following treatment interventions:    Interventions:  Therapeutic Exercise (00112) including: strength training, ROM, and functional mobility  Therapeutic Activities (15635) including: functional mobility training and education.  Neuromuscular Re-education (15167) activation and proprioception, including postural re-education.    Gait Training (57724) for normalization of ambulation patterns and AD training.   Manual Therapy (11982) as indicated to include: Passive Range of Motion, Gr I-IV mobilizations, and Soft Tissue Mobilization  Modalities as needed that may include: Cryotherapy, Electrical Stimulation, and Vasoneumatic Compression    Plan: Cont POC- Continue emphasis/focus on exercise progression, improving proper muscle recruitment and activation/motor control patterns, modulating pain, increasing ROM, and reduce/eliminate soft tissue swelling/inflammation/restriction. Next visit plan to progress reps and add new exercises     Electronically Signed by JERAMY Angulo PT, DPT  Date: 05/24/2024     Note: Portions of this note have been templated and/or copied from initial evaluation, reassessments and prior notes for documentation efficiency.

## 2024-05-24 NOTE — TELEPHONE ENCOUNTER
Medication [facility] to [facility],patient requesting medication send to [facility] pharmacy,script pended .

## 2024-05-28 ENCOUNTER — HOSPITAL ENCOUNTER (OUTPATIENT)
Dept: PHYSICAL THERAPY | Age: 69
Setting detail: THERAPIES SERIES
Discharge: HOME | End: 2024-05-28
Payer: MEDICARE

## 2024-05-28 ENCOUNTER — ANESTHESIA EVENT (OUTPATIENT)
Dept: POSTOP/PACU | Age: 69
End: 2024-05-28
Payer: MEDICARE

## 2024-05-28 ENCOUNTER — CLINICAL DOCUMENTATION (OUTPATIENT)
Dept: ORTHOPEDIC SURGERY | Age: 69
End: 2024-05-28

## 2024-05-28 DIAGNOSIS — Z96.652 S/P TOTAL KNEE ARTHROPLASTY, LEFT: ICD-10-CM

## 2024-05-28 DIAGNOSIS — M25.462 SWELLING OF JOINT OF LEFT KNEE: Primary | ICD-10-CM

## 2024-05-28 PROCEDURE — 97140 MANUAL THERAPY 1/> REGIONS: CPT | Performed by: PHYSICAL THERAPY ASSISTANT

## 2024-05-28 PROCEDURE — 97110 THERAPEUTIC EXERCISES: CPT | Performed by: PHYSICAL THERAPY ASSISTANT

## 2024-05-28 PROCEDURE — 97016 VASOPNEUMATIC DEVICE THERAPY: CPT | Performed by: PHYSICAL THERAPY ASSISTANT

## 2024-05-28 RX ORDER — METHYLPREDNISOLONE 4 MG/1
TABLET ORAL
Qty: 1 KIT | Refills: 0 | Status: SHIPPED | OUTPATIENT
Start: 2024-05-29 | End: 2024-06-03

## 2024-05-28 NOTE — FLOWSHEET NOTE
answered 'NO' to both behavioral questions on intake.  No further screening warranted    Preferred Language for Healthcare:   [x] English       [] other:    SUBJECTIVE EXAMINATION     Patient stated complaint: 20 days post op. States he will be having RICO tomorrow. Pt feeling better and having less pain except with KF ROM.       Test used Initial score  5/7/24 05/9/2024 5/13/24 5/17/24 5/20/24 5/28/24   Pain Summary VAS 0-9/10 7-8/10 3-7/10 3-7/10 5-7/10 3-7/10   Functional questionnaire WOMAC 36% deficit 88% deficit       Other:          Flexion ROM    56 manual fig-4 54 manual fig-4 Appr 50 50 manual fig-4   Ext ROM    -10 best -12 best -10 -12   Girth L knee   Mid patella 40.9cm  Supra patella 41cm  5 cm above 40.5cm      OBJECTIVE EXAMINATION     SURGERY: L TKA   Scheduled Sx Date: 5/8/24   Hospital D/C recommendations outpatient therapy   Additional Comments:      Functional Testing Prehab     Date: 05/28/2024 Post-op Re-Eval    Date:  5/9/24 6 weeks from surgery   Date : D/C      Date:    Current AD use None      TUG (sec) 9.79 sec X     30 second sit to stand w/out UE (reps) Unable d/t cortisone inj in R knee prior to PT X     Gait speed (m/s)  4x10m fast walk  Result = 40m/total time Held      Supine Hip/Knee AROM L R L R L R L R   Knee Flexion 122 128 45        Knee Extension -3 0 -12        WOMAC (raw) 35/96 84/96       Pre-op   (0-5) MMT L MMT R Notes       HIP Flexion 5 5     Abduction 4 4     Extension 4 4           KNEE Flexion 4 4+     Extension 4+ 5      Wound  Local bandage removed, one small area with spotting bleeding near distal incision. Pt has diffuse deep redness  around joint (M>L) and running down shin that is very point tender. 5/17/24      Palpation:   Patient reported tenderness with palpation  Location:joint lines    Gait:    Pattern: antalgic pattern, Increased CARLOZ, decreased step length, and lack heel strike on left  Assistive Device Used: Rolling walker (RW)    Balance:  [x] WNL

## 2024-05-28 NOTE — PROGRESS NOTES
History & Physical        Reason for admission:      History Obtained From:  patient    HISTORY OF PRESENT ILLNESS:      The patient is a 68 y.o. male who presents to the office for history and physical exam prior to undergoing a manipulation under anesthesia on 05/29/2024.  The patient recently underwent a left total knee arthroplasty and an MCL reconstruction by Dr. Brian Sutherland on 05/08/2024.  He has had some difficulty with obtaining appropriate range of motion in therapy and with home exercise program.  Other conservative treatments for his condition postoperatively have included: rest, ice, elevation, bracing, physical therapy, home exercises, activity modification, OTC medications as needed, ambulating with a walker, oxycodone and Medrol Dosepak.  Despite this he is limited to flexion of approximately 45 degrees.  Because of this Dr. Brian Sutherland MD and myself agree that manipulation under anesthesia is the most appropriate treatment course.  This was reviewed with the patient and he also agrees and has been scheduled appropriately.  Overall, his health has not changed since he was cleared by his PCP for his initial total knee arthroplasty.  Currently he denies any fevers, chills, chest pain, shortness of breath, abdominal discomfort, difficulty with bowel or bladder function, numbness, paresthesias, calf pain, or other organ dysfunction.        Past Medical History:        Diagnosis Date    Cancer (HCC) August 2022    Appendix    Cancer (HCC) 02/07/2022, 08/16/22    Prostate, Appendix    Cancer of appendix (HCC)     2022    Cataract 2022    COPD     mild- no inhalers    Elevated liver function tests     Giardiasis     Hx of smoking     Hyperlipidemia     Kidney stone     Marijuana smoker     Prostate cancer (HCC) March 2023    Prostate cancer (HCC)        Past Surgical History:        Procedure Laterality Date    APPENDECTOMY  August 2022    CARDIOVASCULAR STRESS TEST  02/01/2008    negative

## 2024-05-28 NOTE — ANESTHESIA PRE PROCEDURE
Component Value Date/Time     04/11/2024 03:39 PM    K 4.4 04/11/2024 03:39 PM     04/11/2024 03:39 PM    CO2 29 04/11/2024 03:39 PM    BUN 20 04/11/2024 03:39 PM    CREATININE 0.9 04/11/2024 03:39 PM    GFRAA >60 03/03/2022 12:21 PM    GFRAA >60 04/23/2013 10:32 AM    AGRATIO 1.7 04/11/2024 03:39 PM    LABGLOM >90 04/11/2024 03:39 PM    GLUCOSE 104 04/11/2024 03:39 PM    GLUCOSE 116 03/23/2022 08:57 AM    PROT 6.9 07/05/2012 11:11 AM    CALCIUM 9.9 04/11/2024 03:39 PM    BILITOT 0.4 04/11/2024 03:39 PM    ALKPHOS 104 04/11/2024 03:39 PM    AST 22 04/11/2024 03:39 PM    ALT 26 04/11/2024 03:39 PM       POC Tests: No results for input(s): \"POCGLU\", \"POCNA\", \"POCK\", \"POCCL\", \"POCBUN\", \"POCHEMO\", \"POCHCT\" in the last 72 hours.    Coags:   Lab Results   Component Value Date/Time    PROTIME 13.9 04/11/2024 03:39 PM    INR 1.05 04/11/2024 03:39 PM    APTT 28.4 04/11/2024 03:39 PM       HCG (If Applicable): No results found for: \"PREGTESTUR\", \"PREGSERUM\", \"HCG\", \"HCGQUANT\"     ABGs: No results found for: \"PHART\", \"PO2ART\", \"AVK8IKV\", \"PNO9AZG\", \"BEART\", \"T0XPEVCN\"     Type & Screen (If Applicable):  No results found for: \"LABABO\"    Drug/Infectious Status (If Applicable):  No results found for: \"HIV\", \"HEPCAB\"    COVID-19 Screening (If Applicable): No results found for: \"COVID19\"        Anesthesia Evaluation  Patient summary reviewed and Nursing notes reviewed   no history of anesthetic complications:   Airway: Mallampati: II  TM distance: >3 FB   Neck ROM: full  Mouth opening: > = 3 FB   Dental: normal exam         Pulmonary: breath sounds clear to auscultation  (+)  COPD:                                     Cardiovascular:  Exercise tolerance: good (>4 METS)  (+) hyperlipidemia        Rhythm: regular  Rate: normal                    Neuro/Psych:   (+) neuromuscular disease:            GI/Hepatic/Renal:             Endo/Other:    (+) : arthritis: OA..                  ROS comment: S/p L TKA Abdominal:

## 2024-05-29 ENCOUNTER — HOSPITAL ENCOUNTER (OUTPATIENT)
Dept: POSTOP/PACU | Age: 69
Discharge: HOME | End: 2024-05-29
Payer: MEDICARE

## 2024-05-29 ENCOUNTER — HOSPITAL ENCOUNTER (OUTPATIENT)
Dept: PHYSICAL THERAPY | Age: 69
Setting detail: THERAPIES SERIES
Discharge: HOME | End: 2024-05-29
Payer: MEDICARE

## 2024-05-29 ENCOUNTER — ANESTHESIA (OUTPATIENT)
Dept: POSTOP/PACU | Age: 69
End: 2024-05-29
Payer: MEDICARE

## 2024-05-29 VITALS
OXYGEN SATURATION: 95 % | WEIGHT: 177.8 LBS | BODY MASS INDEX: 26.33 KG/M2 | DIASTOLIC BLOOD PRESSURE: 75 MMHG | SYSTOLIC BLOOD PRESSURE: 122 MMHG | HEART RATE: 66 BPM | TEMPERATURE: 97.3 F | HEIGHT: 69 IN | RESPIRATION RATE: 18 BRPM

## 2024-05-29 PROCEDURE — 97110 THERAPEUTIC EXERCISES: CPT | Performed by: PHYSICAL THERAPY ASSISTANT

## 2024-05-29 PROCEDURE — 7100000011 HC PHASE II RECOVERY - ADDTL 15 MIN: Performed by: ANESTHESIOLOGY

## 2024-05-29 PROCEDURE — 3700000000 HC ANESTHESIA ATTENDED CARE: Performed by: ANESTHESIOLOGY

## 2024-05-29 PROCEDURE — 2580000003 HC RX 258: Performed by: ANESTHESIOLOGY

## 2024-05-29 PROCEDURE — 97016 VASOPNEUMATIC DEVICE THERAPY: CPT | Performed by: PHYSICAL THERAPY ASSISTANT

## 2024-05-29 PROCEDURE — 6370000000 HC RX 637 (ALT 250 FOR IP): Performed by: ANESTHESIOLOGY

## 2024-05-29 PROCEDURE — 64448 NJX AA&/STRD FEM NRV NFS IMG: CPT | Performed by: ANESTHESIOLOGY

## 2024-05-29 PROCEDURE — 97140 MANUAL THERAPY 1/> REGIONS: CPT | Performed by: PHYSICAL THERAPY ASSISTANT

## 2024-05-29 PROCEDURE — 6360000002 HC RX W HCPCS

## 2024-05-29 PROCEDURE — 6360000002 HC RX W HCPCS: Performed by: PHYSICIAN ASSISTANT

## 2024-05-29 PROCEDURE — 7100000000 HC PACU RECOVERY - FIRST 15 MIN: Performed by: ANESTHESIOLOGY

## 2024-05-29 PROCEDURE — 6360000002 HC RX W HCPCS: Performed by: ANESTHESIOLOGY

## 2024-05-29 PROCEDURE — 3600000500 HC JOINT MANIPULATION: Performed by: ANESTHESIOLOGY

## 2024-05-29 PROCEDURE — 7100000010 HC PHASE II RECOVERY - FIRST 15 MIN: Performed by: ANESTHESIOLOGY

## 2024-05-29 PROCEDURE — 3700000001 HC ADD 15 MINUTES (ANESTHESIA): Performed by: ANESTHESIOLOGY

## 2024-05-29 PROCEDURE — 7100000001 HC PACU RECOVERY - ADDTL 15 MIN: Performed by: ANESTHESIOLOGY

## 2024-05-29 RX ORDER — OXYCODONE HYDROCHLORIDE 5 MG/1
5 TABLET ORAL
Status: COMPLETED | OUTPATIENT
Start: 2024-05-29 | End: 2024-05-29

## 2024-05-29 RX ORDER — SODIUM CHLORIDE, SODIUM LACTATE, POTASSIUM CHLORIDE, CALCIUM CHLORIDE 600; 310; 30; 20 MG/100ML; MG/100ML; MG/100ML; MG/100ML
INJECTION, SOLUTION INTRAVENOUS CONTINUOUS
Status: DISCONTINUED | OUTPATIENT
Start: 2024-05-29 | End: 2024-05-30 | Stop reason: HOSPADM

## 2024-05-29 RX ORDER — PROPOFOL 10 MG/ML
INJECTION, EMULSION INTRAVENOUS PRN
Status: DISCONTINUED | OUTPATIENT
Start: 2024-05-29 | End: 2024-05-29 | Stop reason: SDUPTHER

## 2024-05-29 RX ORDER — LIDOCAINE HYDROCHLORIDE 20 MG/ML
INJECTION, SOLUTION INTRAVENOUS PRN
Status: DISCONTINUED | OUTPATIENT
Start: 2024-05-29 | End: 2024-05-29 | Stop reason: SDUPTHER

## 2024-05-29 RX ORDER — LABETALOL HYDROCHLORIDE 5 MG/ML
10 INJECTION, SOLUTION INTRAVENOUS
Status: DISCONTINUED | OUTPATIENT
Start: 2024-05-29 | End: 2024-05-30 | Stop reason: HOSPADM

## 2024-05-29 RX ORDER — HYDRALAZINE HYDROCHLORIDE 20 MG/ML
10 INJECTION INTRAMUSCULAR; INTRAVENOUS
Status: DISCONTINUED | OUTPATIENT
Start: 2024-05-29 | End: 2024-05-30 | Stop reason: HOSPADM

## 2024-05-29 RX ORDER — NALOXONE HYDROCHLORIDE 0.4 MG/ML
INJECTION, SOLUTION INTRAMUSCULAR; INTRAVENOUS; SUBCUTANEOUS PRN
Status: DISCONTINUED | OUTPATIENT
Start: 2024-05-29 | End: 2024-05-30 | Stop reason: HOSPADM

## 2024-05-29 RX ORDER — SODIUM CHLORIDE 0.9 % (FLUSH) 0.9 %
5-40 SYRINGE (ML) INJECTION EVERY 12 HOURS SCHEDULED
Status: DISCONTINUED | OUTPATIENT
Start: 2024-05-29 | End: 2024-05-30 | Stop reason: HOSPADM

## 2024-05-29 RX ORDER — HYDROMORPHONE HYDROCHLORIDE 1 MG/ML
0.5 INJECTION, SOLUTION INTRAMUSCULAR; INTRAVENOUS; SUBCUTANEOUS EVERY 5 MIN PRN
Status: DISCONTINUED | OUTPATIENT
Start: 2024-05-29 | End: 2024-05-30 | Stop reason: HOSPADM

## 2024-05-29 RX ORDER — MIDAZOLAM HYDROCHLORIDE 1 MG/ML
2 INJECTION INTRAMUSCULAR; INTRAVENOUS ONCE
Status: COMPLETED | OUTPATIENT
Start: 2024-05-29 | End: 2024-05-29

## 2024-05-29 RX ORDER — BUPIVACAINE HYDROCHLORIDE 5 MG/ML
INJECTION, SOLUTION EPIDURAL; INTRACAUDAL
Status: COMPLETED
Start: 2024-05-29 | End: 2024-05-29

## 2024-05-29 RX ORDER — MIDAZOLAM HYDROCHLORIDE 1 MG/ML
INJECTION INTRAMUSCULAR; INTRAVENOUS
Status: COMPLETED
Start: 2024-05-29 | End: 2024-05-29

## 2024-05-29 RX ORDER — MEPERIDINE HYDROCHLORIDE 25 MG/ML
12.5 INJECTION INTRAMUSCULAR; INTRAVENOUS; SUBCUTANEOUS EVERY 5 MIN PRN
Status: DISCONTINUED | OUTPATIENT
Start: 2024-05-29 | End: 2024-05-30 | Stop reason: HOSPADM

## 2024-05-29 RX ORDER — SODIUM CHLORIDE 9 MG/ML
INJECTION, SOLUTION INTRAVENOUS PRN
Status: DISCONTINUED | OUTPATIENT
Start: 2024-05-29 | End: 2024-05-30 | Stop reason: HOSPADM

## 2024-05-29 RX ORDER — SODIUM CHLORIDE 0.9 % (FLUSH) 0.9 %
5-40 SYRINGE (ML) INJECTION PRN
Status: DISCONTINUED | OUTPATIENT
Start: 2024-05-29 | End: 2024-05-30 | Stop reason: HOSPADM

## 2024-05-29 RX ORDER — ONDANSETRON 2 MG/ML
4 INJECTION INTRAMUSCULAR; INTRAVENOUS
Status: DISCONTINUED | OUTPATIENT
Start: 2024-05-29 | End: 2024-05-30 | Stop reason: HOSPADM

## 2024-05-29 RX ORDER — FENTANYL CITRATE 50 UG/ML
INJECTION, SOLUTION INTRAMUSCULAR; INTRAVENOUS PRN
Status: DISCONTINUED | OUTPATIENT
Start: 2024-05-29 | End: 2024-05-29 | Stop reason: SDUPTHER

## 2024-05-29 RX ORDER — PROCHLORPERAZINE EDISYLATE 5 MG/ML
5 INJECTION INTRAMUSCULAR; INTRAVENOUS
Status: DISCONTINUED | OUTPATIENT
Start: 2024-05-29 | End: 2024-05-30 | Stop reason: HOSPADM

## 2024-05-29 RX ORDER — BUPIVACAINE HYDROCHLORIDE 5 MG/ML
INJECTION, SOLUTION EPIDURAL; INTRACAUDAL PRN
Status: DISCONTINUED | OUTPATIENT
Start: 2024-05-29 | End: 2024-05-29 | Stop reason: SDUPTHER

## 2024-05-29 RX ADMIN — PROPOFOL 50 MG: 10 INJECTION, EMULSION INTRAVENOUS at 07:31

## 2024-05-29 RX ADMIN — PROPOFOL 50 MG: 10 INJECTION, EMULSION INTRAVENOUS at 07:30

## 2024-05-29 RX ADMIN — ROPIVACAINE HYDROCHLORIDE 600 ML: 2 INJECTION, SOLUTION EPIDURAL; INFILTRATION at 09:58

## 2024-05-29 RX ADMIN — PROPOFOL 50 MG: 10 INJECTION, EMULSION INTRAVENOUS at 07:36

## 2024-05-29 RX ADMIN — FENTANYL CITRATE 50 MCG: 50 INJECTION, SOLUTION INTRAMUSCULAR; INTRAVENOUS at 07:30

## 2024-05-29 RX ADMIN — BUPIVACAINE HYDROCHLORIDE 30 ML: 5 INJECTION, SOLUTION EPIDURAL; INTRACAUDAL; PERINEURAL at 08:35

## 2024-05-29 RX ADMIN — PROPOFOL 50 MG: 10 INJECTION, EMULSION INTRAVENOUS at 07:33

## 2024-05-29 RX ADMIN — MIDAZOLAM HYDROCHLORIDE 2 MG: 1 INJECTION, SOLUTION INTRAMUSCULAR; INTRAVENOUS at 08:11

## 2024-05-29 RX ADMIN — SODIUM CHLORIDE, SODIUM LACTATE, POTASSIUM CHLORIDE, AND CALCIUM CHLORIDE: .6; .31; .03; .02 INJECTION, SOLUTION INTRAVENOUS at 06:50

## 2024-05-29 RX ADMIN — PROPOFOL 30 MG: 10 INJECTION, EMULSION INTRAVENOUS at 07:39

## 2024-05-29 RX ADMIN — HYDROMORPHONE HYDROCHLORIDE 0.5 MG: 1 INJECTION, SOLUTION INTRAMUSCULAR; INTRAVENOUS; SUBCUTANEOUS at 07:57

## 2024-05-29 RX ADMIN — FENTANYL CITRATE 50 MCG: 50 INJECTION, SOLUTION INTRAMUSCULAR; INTRAVENOUS at 07:32

## 2024-05-29 RX ADMIN — LIDOCAINE HYDROCHLORIDE 100 MG: 20 INJECTION, SOLUTION INTRAVENOUS at 07:30

## 2024-05-29 RX ADMIN — MIDAZOLAM HYDROCHLORIDE 2 MG: 1 INJECTION INTRAMUSCULAR; INTRAVENOUS at 08:11

## 2024-05-29 RX ADMIN — OXYCODONE 5 MG: 5 TABLET ORAL at 10:00

## 2024-05-29 ASSESSMENT — PAIN DESCRIPTION - DESCRIPTORS
DESCRIPTORS: ACHING
DESCRIPTORS: ACHING;BURNING;DISCOMFORT
DESCRIPTORS: SHARP;ACHING
DESCRIPTORS: ACHING

## 2024-05-29 ASSESSMENT — PAIN - FUNCTIONAL ASSESSMENT
PAIN_FUNCTIONAL_ASSESSMENT: PREVENTS OR INTERFERES SOME ACTIVE ACTIVITIES AND ADLS
PAIN_FUNCTIONAL_ASSESSMENT: PREVENTS OR INTERFERES SOME ACTIVE ACTIVITIES AND ADLS
PAIN_FUNCTIONAL_ASSESSMENT: 0-10
PAIN_FUNCTIONAL_ASSESSMENT: 0-10
PAIN_FUNCTIONAL_ASSESSMENT: ACTIVITIES ARE NOT PREVENTED
PAIN_FUNCTIONAL_ASSESSMENT: 0-10

## 2024-05-29 ASSESSMENT — PAIN DESCRIPTION - LOCATION: LOCATION: KNEE

## 2024-05-29 ASSESSMENT — PAIN SCALES - GENERAL
PAINLEVEL_OUTOF10: 6
PAINLEVEL_OUTOF10: 5

## 2024-05-29 ASSESSMENT — PAIN DESCRIPTION - ORIENTATION: ORIENTATION: LEFT

## 2024-05-29 NOTE — PROGRESS NOTES
Ambulatory Surgery/Procedure Discharge Note    Vitals:    05/29/24 0945   BP: 122/75   Pulse: 66   Resp: 18   Temp: 97.3 °F (36.3 °C)   SpO2: 95%       In: 2330 [P.O.:480; I.V.:1850]  Out: -     Restroom use offered before discharge.  Yes    Pain assessment:  present - adequately treated  Pain Level: 5    Patient received 5mg of oxy before discharge. Patient put knee brace back on LLE. Pain pump set up by OSVALDO Uriostegui in SDS Phase 2. VSS. Patient tolerating all PO intake. Patient offered to void before discharge. Discharge instructions given to patient and patients friend. Patient is ready for discharge.    Patient discharged to home/self care. Patient discharged via wheel chair by transporter to waiting family/S.O.       5/29/2024 10:23 AM

## 2024-05-29 NOTE — OP NOTE
[place] [id number]                            OPERATIVE REPORT      PATIENT NAME: IRVING GONZALEZ              : 1955  MED REC NO: [id number]                      ROOM:   ACCOUNT NO: [id number]                       ADMIT DATE: 2024  PROVIDER: Brian Sutherland MD      DATE OF PROCEDURE:      SURGEON:  Brian Sutherland MD    PREOPERATIVE DIAGNOSIS:  Limitation of motion, left knee, status post total knee replacement.    POSTOPERATIVE DIAGNOSIS:  Limitation of motion, left knee, status post total knee replacement.    PROCEDURE:  Gentle manipulation, left knee under chemical analgesia.    ASSISTANT:  Shai Nuñez PA-C.    OPERATIVE INDICATIONS:  This patient did undergo total knee replacement 3 weeks ago.  He has been in a correct physical therapy program and is unable to progress past 50 degrees of knee flexion despite his own compliance and as well under a well supervised rehabilitation program.  He understood the associated risks and benefits, and the goal was to do a very early gentle manipulation before the scar tissue had become mature.    DESCRIPTION OF PROCEDURE:  This patient did undergo signing of the left limb and the time-out procedure by the surgeon and surgical team.  After an adequate level of chemical analgesia, a very gentle manipulation was performed starting from 0-50 degrees and proceeding to 0-125 degrees.  Only light to mild pressure was necessary to achieve this as well.  Patellar mobilization was performed.  There was a definite resistance, however, encountered, and the remanipulation always proceeded with a soft endpoint and a hard endpoint indicating rigid scar tissue was not uncommon.  Adductor continuous block was ordered.  The patient will be started on a Medrol Dosepak, representing his second course.    The superolateral portion of the joint was prepped with a double prep and the needle

## 2024-05-29 NOTE — DISCHARGE INSTRUCTIONS
PATIENT INSTRUCTIONS FOLLOWING MANIPULATION      1. Elevate the operated area above heart level and apply ice bag 20 minutes of every hour to operative extremity.  2. Any dressing you may have will be removed in the clinic / PT. DO NOT remove sutures, staples or Steri-strips if you have any.  3. Use crutches as needed.  4. Weight bearing with crutches for assistance to operative extremity.  5. Ankle pumps frequently  6. Follow up with previously scheduled appointment or call the office for an appointment if you do not already have one.  7. Call your doctor if:   Your incision becomes red, swollen or develops drainage  If the wound becomes increasingly painful uncontrolled with the pain medications.   If you develop fever greater than 101 degrees after the first 48 hours.  8. Please call with questions/concerns.      Board Certified Orthopaedic Surgeon  President and Medical Director  [facility] AMBULATORY PROCEDURE DISCHARGE INSTRUCTIONS    There are potential side effects of anesthesia or sedation you may experience for the first 24 hours.  These side effects include:    Confusion or Memory loss, Dizziness, or Delayed Reaction Times   [x]A responsible person should be with you for the next 24 hours.  Do not operate any vehicles (automobiles, bicycles, motorcycles) or power tools or machinery for 24 hours.  Do not sign any legal documents or make any legal decisions for 24 hours. Do not drink alcohol for 24 hours or while taking narcotic pain medication.      Nausea    [x]Start with light diet and progress to your normal diet as you feel like eating. However, if you experience nausea or repeated episodes of vomiting which persist beyond 12-24 hours, notify your physician.  Once nausea has passed, remember to keep drinking fluids.    Difficulty Passing Urine  [x]Drink extra amounts of fluid today.  Notify your physician if you have not urinated

## 2024-05-29 NOTE — PROGRESS NOTES
Patient admitted to PACU bed #  16  from [facility] @ 0720 in preperation for  Left Knee   manipulation per Dr. Sutherland.  Patient attached to PACU monitoring system.  Patient, procedure and site confirmed with Dr. Sutherland and anesthesia provider at bedside.  Manipulation began at 0727 and ended at 0735.  Pre-procedure flexion 0/50 degrees; post-procedure flexion 0/130 degrees. Anesthesia monitored VS's and maintained airway during procedure. Anesthesia start 0727 end time and hand off time 0747

## 2024-05-29 NOTE — ANESTHESIA PROCEDURE NOTES
Peripheral Block    Patient location during procedure: PACU  Reason for block: post-op pain management and at surgeon's request  Start time: 5/29/2024 8:04 AM  End time: 5/29/2024 8:35 AM  Staffing  Performed: anesthesiologist   Anesthesiologist: Ron Jade MD  Performed by: Ron Jade MD  Authorized by: Ron Jade MD    Preanesthetic Checklist  Completed: patient identified, IV checked, site marked, risks and benefits discussed, surgical/procedural consents, equipment checked, pre-op evaluation, timeout performed, anesthesia consent given, oxygen available and monitors applied/VS acknowledged  Peripheral Block   Patient position: supine  Prep: ChloraPrep  Provider prep: mask and sterile gloves  Patient monitoring: cardiac monitor, continuous pulse ox, frequent blood pressure checks and IV access  Block type: Femoral  Adductor canal  Laterality: left  Injection technique: catheter  Guidance: ultrasound guided  Local infiltration: lidocaine  Infiltration strength: 1 %  Local infiltration: lidocaine    Needle   Needle type: insulated echogenic nerve stimulator needle   Needle gauge: 22 G  Needle localization: ultrasound guidance  Needle length: 5 cm  Assessment   Injection assessment: negative aspiration for heme, no paresthesia on injection and local visualized surrounding nerve on ultrasound  Slow fractionated injection: yes  Hemodynamics: stable  Outcomes: uncomplicated and patient tolerated procedure well    Additional Notes  Sartorius and Vastus Medialis Muscle, Femoral artery and Saphenous nerve are identified; the tip of the needle and the spread of the local anesthetic around the Saphenous nerve are visualized. The Saphenous nerve appeared to be anatomically normal and there were no abnormal pathologically findings seen.

## 2024-05-29 NOTE — PROGRESS NOTES
PACU Transfer to [facility]    Vitals:    05/29/24 0915   BP: 106/71   Pulse: 73   Resp: 15   Temp: 97.2 °F (36.2 °C)   SpO2: 98%         Intake/Output Summary (Last 24 hours) at 5/29/2024 0937  Last data filed at 5/29/2024 0915  Gross per 24 hour   Intake 1360 ml   Output --   Net 1360 ml       Pain assessment:  receiving treatment  Pain Level: 6    Patient transferred to care of SAIRA RN.    5/29/2024 9:37 AM

## 2024-05-29 NOTE — PROGRESS NOTES
0804 Dr Jade at bedside time out preformed. Left abductor canal nerve block with insertion of On Q pain control catheter

## 2024-05-29 NOTE — FLOWSHEET NOTE
decreased NM control, and decreased balance control which required ongoing skilled physical therapy and decision making.       Medical Necessity Documentation:  I certify that this patient meets the below criteria necessary for medical necessity for care and/or justification of therapy services:  The patient has functional impairments and/or activity limitations and would benefit from continued outpatient therapy services to address the deficits outlined in the patients goals  The patient has a musculoskeletal condition(s) with a corresponding ICD-10 code that is of complexity and severity that require skilled therapeutic intervention. This has a direct and significant impact on the need for therapy and significantly impacts the rate of recovery.       Return to Play: NA    Prognosis for POC: [x] Good [] Fair  [] Poor    Patient requires continued skilled intervention: [x] Yes  [] No      CHARGE CAPTURE     PT CHARGE GRID   CPT Code (TIMED) minutes # CPT Code (UNTIMED) #     Therex (94044)  15 1  EVAL:LOW (69964 - Typically 20 minutes face-to-face)     Neuromusc. Re-ed (84425)    Re-Eval (39505)     Manual (24513) 10 1  Estim Unattended (73072)     Ther. Act (74739)    Mech. Traction (49062)     Gait (39147)    Dry Needle 1-2 muscle (67520)     Aquatic Therex (83705)    Dry Needle 3+ muscle (20561)     Iontophoresis (76521)    VASO (33593) 1    Ultrasound (97271)    Group Therapy (27631)     Estim Attended (54757)    Canalith Repositioning (09477)     Other:    Other:    Total Timed Code Tx Minutes 25 2  1     Total Treatment Minutes 70   10:30-11:40  70'    Charge Justification:  (92823) THERAPEUTIC EXERCISE - Provided verbal/tactile cueing for activities related to strengthening, flexibility, endurance, ROM performed to prevent loss of range of motion, maintain or improve muscular strength or increase flexibility, following either an injury or surgery.   (37262) HOME EXERCISE PROGRAM - Reviewed/Progressed HEP

## 2024-05-29 NOTE — ANESTHESIA POSTPROCEDURE EVALUATION
Department of Anesthesiology  Postprocedure Note    Patient: Galindo Dickson  MRN: [id number]  YOB: 1955  Date of evaluation: 5/29/2024    Procedure Summary       Date: 05/29/24 Room / Location: [facility] PACU    Anesthesia Start: 0727 Anesthesia Stop: 0747    Procedure: JOINT MANIPULATION Diagnosis:     Scheduled Providers: Arielle Petersen DO Responsible Provider: Ron Jade MD    Anesthesia Type: MAC ASA Status: 2            Anesthesia Type: MAC    Emeterio Phase I: Emeterio Score: 10    Emeterio Phase II:      Anesthesia Post Evaluation    Patient location during evaluation: PACU  Patient participation: complete - patient participated  Level of consciousness: awake and alert  Airway patency: patent  Nausea & Vomiting: no nausea and no vomiting  Cardiovascular status: hemodynamically stable  Respiratory status: acceptable  Hydration status: euvolemic  Multimodal analgesia pain management approach  Pain management: satisfactory to patient    No notable events documented.

## 2024-05-30 ENCOUNTER — HOSPITAL ENCOUNTER (OUTPATIENT)
Dept: PHYSICAL THERAPY | Age: 69
Setting detail: THERAPIES SERIES
Discharge: HOME | End: 2024-05-30
Payer: MEDICARE

## 2024-05-30 ENCOUNTER — CARE COORDINATION (OUTPATIENT)
Dept: CASE MANAGEMENT | Age: 69
End: 2024-05-30

## 2024-05-30 DIAGNOSIS — Z47.89 ORTHOPEDIC AFTERCARE: ICD-10-CM

## 2024-05-30 PROCEDURE — 97016 VASOPNEUMATIC DEVICE THERAPY: CPT | Performed by: PHYSICAL THERAPIST

## 2024-05-30 PROCEDURE — 97140 MANUAL THERAPY 1/> REGIONS: CPT | Performed by: PHYSICAL THERAPIST

## 2024-05-30 PROCEDURE — 97110 THERAPEUTIC EXERCISES: CPT | Performed by: PHYSICAL THERAPIST

## 2024-05-30 RX ORDER — OXYCODONE HYDROCHLORIDE 5 MG/1
5 TABLET ORAL EVERY 4 HOURS PRN
Qty: 30 TABLET | Refills: 0 | Status: SHIPPED | OUTPATIENT
Start: 2024-05-30 | End: 2024-06-04

## 2024-05-30 NOTE — FLOWSHEET NOTE
manual lymphatic drainage, manual traction) for the purpose of modulating pain, promoting relaxation,  increasing ROM, reducing/eliminating soft tissue swelling/inflammation/restriction, improving soft tissue extensibility and allowing for proper ROM for normal function with self care, mobility, lifting and ambulation  (41937) VASOPNEUMATIC  (01999) ADL - Provided self-care/home management training related to activities of daily living and compensatory training, and/or use of adaptive equipment for improvement with: ADLs and compensatory training, meal preparation, safety procedures and instruction in use of adaptive equipment, including bathing, grooming, dressing, personal hygiene, basic household cleaning and chores.       GOALS     GOALS:  Patient stated goal: Return to PLOF without restriction.  [] Progressing: [] Met: [] Not Met: [] Adjusted    Therapist goals for Patient:   Short Term Goals: To be achieved in: 1-2 visit(s)  1. Independent in HEP and progression per patient tolerance, in order to prevent re-injury.   [] Progressing: [] Met: [] Not Met: [] Adjusted  2. All patient questions regarding expectations for rehab following upcoming surgery are answered.   [] Progressing: [] Met: [] Not Met: [] Adjusted    Long Term Goals:   Long Term Goals: To be achieved in: 8 weeks  Disability index score of 50% or less for the WOMAC to assist with return top prior level of function.    Status: [] Progressing: [] Met: [] Not Met: [] Adjusted  Improve  knee PROM  Flexion to 115 degrees or  better to allow for proper joint functioning as indicated by patients functional deficits.  Status: [] Progressing: [] Met: [] Not Met: [] Adjusted  Pt to improve strength to show motor control/activation of quadriceps to facilitate functional mobility and ADLs.   Status: [] Progressing: [] Met: [] Not Met: [] Adjusted  Patient will return to walking around house without increased symptoms or restriction to work towards return to

## 2024-05-30 NOTE — CARE COORDINATION
[facility] Transitions Follow Up Call    Patient:  IRVING GONZALEZ Patient :  1955  MRN:  [id number]   Reason for Admission:  LEFT TKR  Discharge Date:  24  RARS: 0      Transitions of Care Initial Call    Was this an external facility discharge? NO    Discharge Facility: [facility]      Challenges to be reviewed by the provider   Additional needs identified to be addressed with provider:    zhao [facility] Provider Discharge Needs: none            Non-face-to-face services provided:  NONE       1ST CTC attempt to reach Pt regarding recent hospital discharge /  unable to reach.         Follow up appointments:    Future Appointments   Date Time Provider Department Center   2024  4:10 PM Jennifer Angulo PT [facility]   2024  8:50 AM Jennifer Angulo PT [facility]   6/3/2024  8:10 AM Jennifer Angulo PT [facility]   2024  8:00 AM Blayne Copeland, PT [facility] PT [facility]   6/10/2024  8:20 AM Blayne Copeland, PT [facility] PT [facility]   2024  8:00 AM Blayne Copeland, PT [facility] PT [facility]   2024  8:45 AM Talon Gamino MD [facility]   2024 10:00 AM [facility]IP[facility]AL [facility]   2024 11:00 AM Talon Campbell MD [facility]   2024  1:00 PM INFUSION [facility]       PAM Raymundo, RN  Care Transition Coordinator  Contact Number:  [phone]

## 2024-05-31 ENCOUNTER — HOSPITAL ENCOUNTER (OUTPATIENT)
Dept: PHYSICAL THERAPY | Age: 69
Setting detail: THERAPIES SERIES
Discharge: HOME | End: 2024-05-31
Payer: MEDICARE

## 2024-05-31 PROCEDURE — 97016 VASOPNEUMATIC DEVICE THERAPY: CPT | Performed by: PHYSICAL THERAPIST

## 2024-05-31 PROCEDURE — 97140 MANUAL THERAPY 1/> REGIONS: CPT | Performed by: PHYSICAL THERAPIST

## 2024-05-31 PROCEDURE — 97110 THERAPEUTIC EXERCISES: CPT | Performed by: PHYSICAL THERAPIST

## 2024-05-31 NOTE — FLOWSHEET NOTE
[facility]- Outpatient Rehabilitation and Therapy [address], [place] [address] office: [phone] fax: [phone]      Physical Therapy: TREATMENT/PROGRESS NOTE   Patient: Galindo Dickson (68 y.o. male)   Examination Date: 2024   :  1955 MRN: [id number]   Visit #: 10   Insurance Allowable Auth Needed   MN []Yes    [x]No    Insurance: Payor: MEDICARE / Plan: MEDICARE PART A AND B / Product Type: *No Product type* /   Insurance ID: [id number] - (Medicare)  Secondary Insurance (if applicable): [facility]   Treatment Diagnosis:     ICD-10-CM    1. Left knee pain, unspecified chronicity  M25.562       2. Weakness  R53.1       3. Gait abnormality  R26.9          Medical Diagnosis:  Left knee pain [M25.562]  S/P L TKR & MCL repair 24  S/P RICO + qpump 24   Referring Physician: Brina Sutherland MD  PCP: Tamiko Conteh MD       Plan of care signed (Y/N): Y    Date of Patient follow up with Physician: Patient was seen in conjunction with Dr. Brian Sutherland to establish initial/subsequent patient care plan.    24: seen by JAVIER Corona - had to do a full repair of MCL with anchors and sutures along the length of the MCL; fig-4 ROM to 90 deg max for the first 2 weeks at least; thigh discomfort is due to tourniquet; okay to take tylenol between doses of oxycodone, tylenol PM at night or 2 tabs of oxycodone at bedtime to help get more longevity and better sleep  24: seen in MD office; RICO scheduled for 24: med discussion, RICO tomorrow, MDP will be prescribed again tomorrow  24: seen by Shai - leave Qpump in until it runs out; immobilizer can be d/c when qpump is done; push ROM     Progress Report/POC: NO  POC update due: (10 visits /OR AUTH LIMITS, whichever is less)  2024                                             Precautions/ Contra-indications:           Latex allergy:  NO  Pacemaker:    NO  Contraindications for

## 2024-06-03 ENCOUNTER — HOSPITAL ENCOUNTER (OUTPATIENT)
Dept: PHYSICAL THERAPY | Age: 69
Setting detail: THERAPIES SERIES
Discharge: HOME | End: 2024-06-03
Payer: MEDICARE

## 2024-06-03 DIAGNOSIS — M25.462 PAIN AND SWELLING OF LEFT KNEE: Primary | ICD-10-CM

## 2024-06-03 DIAGNOSIS — M25.562 PAIN AND SWELLING OF LEFT KNEE: Primary | ICD-10-CM

## 2024-06-03 PROCEDURE — 97110 THERAPEUTIC EXERCISES: CPT | Performed by: PHYSICAL THERAPIST

## 2024-06-03 PROCEDURE — 97140 MANUAL THERAPY 1/> REGIONS: CPT | Performed by: PHYSICAL THERAPIST

## 2024-06-03 PROCEDURE — 97016 VASOPNEUMATIC DEVICE THERAPY: CPT | Performed by: PHYSICAL THERAPIST

## 2024-06-03 RX ORDER — PREDNISONE 10 MG/1
10 TABLET ORAL DAILY
Qty: 10 TABLET | Refills: 0 | Status: SHIPPED | OUTPATIENT
Start: 2024-06-03 | End: 2024-06-13

## 2024-06-03 NOTE — FLOWSHEET NOTE
[facility]- Outpatient Rehabilitation and Therapy [address], [place] [address] office: [phone] fax: [phone]      Physical Therapy: TREATMENT/PROGRESS NOTE   Patient: Galindo Dickson (68 y.o. male)   Examination Date: 2024   :  1955 MRN: [id number]   Visit #: 11   Insurance Allowable Auth Needed   MN []Yes    [x]No    Insurance: Payor: MEDICARE / Plan: MEDICARE PART A AND B / Product Type: *No Product type* /   Insurance ID: [id number] - (Medicare)  Secondary Insurance (if applicable): [facility]   Treatment Diagnosis:     ICD-10-CM    1. Left knee pain, unspecified chronicity  M25.562       2. Weakness  R53.1       3. Gait abnormality  R26.9          Medical Diagnosis:  Left knee pain [M25.562]  S/P L TKR & MCL repair 24  S/P RICO + qpump 24   Referring Physician: Brian Sutherland MD  PCP: Tamiko Conteh MD       Plan of care signed (Y/N): Y    Date of Patient follow up with Physician: Patient was seen in conjunction with Dr. Brian Sutherland to establish initial/subsequent patient care plan.    24: seen by JAVIER Corona - had to do a full repair of MCL with anchors and sutures along the length of the MCL; fig-4 ROM to 90 deg max for the first 2 weeks at least; thigh discomfort is due to tourniquet; okay to take tylenol between doses of oxycodone, tylenol PM at night or 2 tabs of oxycodone at bedtime to help get more longevity and better sleep  24: seen in MD office; RICO scheduled for 24: med discussion, RICO tomorrow, MDP will be prescribed again tomorrow  24: seen by Shai - leave Qpump in until it runs out; immobilizer can be d/c when qpump is done; push ROM     Progress Report/POC: NO  POC update due: (10 visits /OR AUTH LIMITS, whichever is less)  2024                                             Precautions/ Contra-indications:           Latex allergy:  NO  Pacemaker:    NO  Contraindications for

## 2024-06-03 NOTE — PROGRESS NOTES
Follow the patient with physical therapy.  He had a left TKA on 05-08-24 and a subsequent manipulation on 05-29-24.  During manipulation we are able to flex his knee to approximately 120 degrees.  Pain pump was placed which the patient states was mildly helpful.  At this point the pain ball has been pulled and he is still taking oxycodone 5 mg every 4 hours which he feels mild to moderately helps his pain.  He still only able to gain approximately 80 to 85 degrees of knee flexion in therapy.  He notes intense sensation of stiffness and discomfort with therapy.  Because of the continued swelling we will keep him on 10 mg of prednisone daily.  I instructed him that he can take a tablet and 1/2-2 to in order to work through the pain.  She did not feel to it to 100 degrees of flexion in the next 5 to 7 days I would consider another manipulation or an arthroscopy for lysis of adhesions and manipulation under anesthesia as well.  All the patient's questions were answered and he agrees with the plan.        Shai Nuñez PA-C    Physician Assistant - Certified  Orthopedic Surgery   [facility]    06/03/24 9:41 AM

## 2024-06-04 DIAGNOSIS — Z96.652 S/P TOTAL KNEE ARTHROPLASTY, LEFT: Primary | ICD-10-CM

## 2024-06-04 RX ORDER — OXYCODONE HYDROCHLORIDE 5 MG/1
10 TABLET ORAL EVERY 4 HOURS PRN
Qty: 60 TABLET | Refills: 0 | Status: SHIPPED | OUTPATIENT
Start: 2024-06-04 | End: 2024-06-09

## 2024-06-04 NOTE — PROGRESS NOTES
[facility]  Knee Pain    Date:  2024    Name:  Galindo Dickson  Address:  [address] [place] [id number]    :  1955      Age:   68 y.o.      Chief Complaint: Left knee pain    History of Present Illness:  Galindo Dickson is a 68 y.o. male s/p a left TKR with MCL reconstrucetion.  He was having difficulty with motion and underwent an RICO.  At this time the patient is receiving pain medicine from the orthopedic surgery team (Dr. Brian Sutherland and myself) for postop surgical pain. Patient was originally attempted to be treated on Percocet 5-325 every 6 hours.  Patient was still experiencing some postoperative pain and therefore the medication frequency was increased from every 6 hours to every 4 hours.  Patient was still experiencing uncontrolled postoperative pain and therefore the medication dose was increased to 1.5 tablets every 4 hours then 2 tablets every 4 hours.      Pain medication is being written on a weekly basis after a thorough discussion with the patient and reassessment of the patient's condition to verify that the patient is receiving the lowest possible dose to manage the pain.  I spoke with the patient today (24) and is still having pain.      Patient denies any signs or symptoms of drowsiness, loss of balance, euphoric sensation, or other symptoms of overuse or misuse of narcotic pain medication.  The patient is being written pain medication for this major orthopedic surgery.  Patient was educated to on the appropriate utilization of narcotic pain medication.  They were given appropriate precautions and restrictions that they should strictly adhere to while taking narcotic pain medication including not conducting any activity that could put them or others at risk of harm or death.  Patient was understanding of all instructions and agreeable with this plan.      Orders Placed This Encounter   Medications    oxyCODONE (ROXICODONE) 5

## 2024-06-07 ENCOUNTER — CLINICAL DOCUMENTATION (OUTPATIENT)
Dept: ORTHOPEDIC SURGERY | Age: 69
End: 2024-06-07

## 2024-06-07 ENCOUNTER — HOSPITAL ENCOUNTER (OUTPATIENT)
Dept: PHYSICAL THERAPY | Age: 69
Setting detail: THERAPIES SERIES
Discharge: HOME | End: 2024-06-07
Payer: MEDICARE

## 2024-06-07 DIAGNOSIS — M25.562 ACUTE PAIN OF LEFT KNEE: ICD-10-CM

## 2024-06-07 DIAGNOSIS — M25.469 EDEMA OF KNEE: ICD-10-CM

## 2024-06-07 DIAGNOSIS — M25.561 RIGHT KNEE PAIN, UNSPECIFIED CHRONICITY: Primary | ICD-10-CM

## 2024-06-07 DIAGNOSIS — M25.662 DECREASED RANGE OF MOTION OF LEFT KNEE: ICD-10-CM

## 2024-06-07 DIAGNOSIS — Z96.652 S/P TOTAL KNEE ARTHROPLASTY, LEFT: Primary | ICD-10-CM

## 2024-06-07 DIAGNOSIS — R26.9 GAIT ABNORMALITY: ICD-10-CM

## 2024-06-07 PROCEDURE — 97110 THERAPEUTIC EXERCISES: CPT | Performed by: PHYSICAL THERAPIST

## 2024-06-07 PROCEDURE — 97016 VASOPNEUMATIC DEVICE THERAPY: CPT | Performed by: PHYSICAL THERAPIST

## 2024-06-07 PROCEDURE — 97140 MANUAL THERAPY 1/> REGIONS: CPT | Performed by: PHYSICAL THERAPIST

## 2024-06-07 RX ORDER — KETOROLAC TROMETHAMINE 30 MG/ML
15 INJECTION, SOLUTION INTRAMUSCULAR; INTRAVENOUS ONCE
Status: CANCELLED | OUTPATIENT
Start: 2024-06-07 | End: 2024-06-07

## 2024-06-07 NOTE — PROGRESS NOTES
Date:  2024    Name:  Galindo STEELE Anabel  Address:  [address] [place] [id number]    :  1955      Age:   68 y.o.        The patient recently underwent a left total knee arthroplasty and an MCL reconstruction by Dr. Brian Sutherland on 2024. He required a manipulation under anesthesia on 2024.  Since then has been working with physical therapy and following our postoperative protocol.  He is continue to have difficulty with pain control as well as stiffness.  This been difficulty achieving appropriate range of motion with physical therapy. Other conservative treatments for his condition postoperatively have included: rest, ice, elevation, bracing, physical therapy, home exercises, activity modification, OTC medications as needed, ambulating with a walker, oxycodone and Medrol Dosepak.  At this point he is has a range of motion of approximately 0 to 80 degrees at best.  In addition, he exhibits decreased patellar mobility and other signs of scar tissue development.  After thorough discussion with the patient's physical therapist, his surgeon Dr. Brian Sutherland MD and the patient we all agree that the most appropriate next treatment course is to undergo another manipulation under anesthesia.  Should this not be successful I believe with a reasonable degree of medical certainty that the most appropriate next step would be for the patient to have a left knee arthroscopy for lysis of adhesions and a surgical manipulation.  Risk and benefits of this procedure were thoroughly discussed with the patient.  Will submit for manipulation and arthroscopy to be done by Dr. Brian Sutherland MD next week.  Until then the patient was instructed to continue conservative treatment, physical therapy, and home exercises as discussed.      Encounter Diagnoses   Name Primary?    S/P total knee arthroplasty, left Yes    Decreased range of motion of left knee     Acute pain of left knee              Shai Nuñez

## 2024-06-07 NOTE — FLOWSHEET NOTE
increasing ROM, and reduce/eliminate soft tissue swelling/inflammation/restriction. Next visit plan to progress reps and add new exercises    F/U daily for PT post RICO and monitor for changes in symptoms/ROM    Electronically Signed by Blayne Copeland, PT Date: 06/07/2024     Note: Portions of this note have been templated and/or copied from initial evaluation, reassessments and prior notes for documentation efficiency.

## 2024-06-08 RX ORDER — SODIUM CHLORIDE 0.9 % (FLUSH) 0.9 %
5-40 SYRINGE (ML) INJECTION EVERY 12 HOURS SCHEDULED
OUTPATIENT
Start: 2024-06-08

## 2024-06-08 RX ORDER — SODIUM CHLORIDE 0.9 % (FLUSH) 0.9 %
5-40 SYRINGE (ML) INJECTION PRN
OUTPATIENT
Start: 2024-06-08

## 2024-06-08 RX ORDER — SODIUM CHLORIDE 9 MG/ML
INJECTION, SOLUTION INTRAVENOUS PRN
OUTPATIENT
Start: 2024-06-08

## 2024-06-08 RX ORDER — ACETAMINOPHEN 325 MG/1
1000 TABLET ORAL ONCE
OUTPATIENT
Start: 2024-06-08 | End: 2024-06-08

## 2024-06-10 ENCOUNTER — HOSPITAL ENCOUNTER (OUTPATIENT)
Dept: PHYSICAL THERAPY | Age: 69
Setting detail: THERAPIES SERIES
Discharge: HOME | End: 2024-06-10
Payer: MEDICARE

## 2024-06-10 DIAGNOSIS — Z96.652 S/P TOTAL KNEE ARTHROPLASTY, LEFT: Primary | ICD-10-CM

## 2024-06-10 PROCEDURE — 97110 THERAPEUTIC EXERCISES: CPT | Performed by: PHYSICAL THERAPIST

## 2024-06-10 PROCEDURE — 97140 MANUAL THERAPY 1/> REGIONS: CPT | Performed by: PHYSICAL THERAPIST

## 2024-06-10 PROCEDURE — 97016 VASOPNEUMATIC DEVICE THERAPY: CPT | Performed by: PHYSICAL THERAPIST

## 2024-06-10 RX ORDER — OXYCODONE HYDROCHLORIDE 5 MG/1
5-10 TABLET ORAL EVERY 4 HOURS PRN
Qty: 60 TABLET | Refills: 0 | Status: SHIPPED | OUTPATIENT
Start: 2024-06-10 | End: 2024-06-15

## 2024-06-10 NOTE — FLOWSHEET NOTE
[facility]- Outpatient Rehabilitation and Therapy [address], [place] [address] office: [phone] fax: [phone]      Physical Therapy: TREATMENT/PROGRESS NOTE   Patient: Galindo Dickson (68 y.o. male)   Examination Date: 06/10/2024   :  1955 MRN: [id number]   Visit #: 2   Insurance Allowable Auth Needed   [place] []Yes    [x]No    Insurance: Payor: MEDICARE / Plan: MEDICARE PART A AND B / Product Type: *No Product type* /   Insurance ID: [id number] - (Medicare)  Secondary Insurance (if applicable): [facility]   Treatment Diagnosis:     ICD-10-CM    1. Left knee pain, unspecified chronicity  M25.562       2. Weakness  R53.1       3. Gait abnormality  R26.9          Medical Diagnosis:  Left knee pain [M25.562]  S/P L TKR & MCL repair 24  S/P RICO + qpump 24   Referring Physician: Brian Sutherland MD  PCP: Tamiko Conteh MD       Plan of care signed (Y/N): Y    Date of Patient follow up with Physician: Patient was seen in conjunction with Dr. Brian Sutherland to establish initial/subsequent patient care plan.    24: seen by JAVIER Corona - had to do a full repair of MCL with anchors and sutures along the length of the MCL; fig-4 ROM to 90 deg max for the first 2 weeks at least; thigh discomfort is due to tourniquet; okay to take tylenol between doses of oxycodone, tylenol PM at night or 2 tabs of oxycodone at bedtime to help get more longevity and better sleep  24: seen in MD office; RICO scheduled for 24: med discussion, RICO tomorrow, MDP will be prescribed again tomorrow  24: seen by Shai - leave Qpump in until it runs out; immobilizer can be d/c when qpump is done; push ROM     Progress Report/POC: NO  POC update due: (10 visits /OR AUTH LIMITS, whichever is less)  2024                                             Precautions/ Contra-indications:           Latex allergy:  NO  Pacemaker:    NO  Contraindications for

## 2024-06-11 ENCOUNTER — CARE COORDINATION (OUTPATIENT)
Dept: CASE MANAGEMENT | Age: 69
End: 2024-06-11

## 2024-06-11 NOTE — CARE COORDINATION
Care Transitions Note  Follow Up Call     Attempted to reach patient for transitions of care follow up.  Unable to reach patient.      Outreach Attempts:   HIPAA compliant voicemail left for patient.     Patient closed (unable to reach patient) from the Care Transitions program on 6/11.  Patient/family .      Handoff:   Patient was not referred to the ACM team due to unable to contact patient.      Care Summary Note:   CT episode closed / CTN signed off pt.    Assessments:  Care Transitions Subsequent and Final Call    Subsequent and Final Calls  Care Transitions Interventions  Other Interventions:              Upcoming Appointments:    Future Appointments         Provider Specialty Dept Phone    6/12/2024  7:30 AM (Arrive by 7:15 AM) Kelby Calhoun, DO; SCHEDULE, [facility] PACU IP Unit [phone]    6/14/2024 8:00 AM Blayne Copeland, PT Physical Therapy [phone]    8/12/2024 8:45 AM Talon Gamino MD Orthopedic Surgery [phone]    8/12/2024 10:00 AM PERIPHERAL Lab [phone]    8/12/2024 11:00 AM Talon Campbell MD Oncology [phone]    8/12/2024 1:00 PM INFUSION Infusion Therapy [phone]            Tyler Mandujano RN

## 2024-06-12 ENCOUNTER — ANESTHESIA EVENT (OUTPATIENT)
Dept: POSTOP/PACU | Age: 69
End: 2024-06-12
Payer: MEDICARE

## 2024-06-12 ENCOUNTER — ANESTHESIA (OUTPATIENT)
Dept: POSTOP/PACU | Age: 69
End: 2024-06-12
Payer: MEDICARE

## 2024-06-12 ENCOUNTER — HOSPITAL ENCOUNTER (OUTPATIENT)
Dept: PHYSICAL THERAPY | Age: 69
Setting detail: THERAPIES SERIES
End: 2024-06-12
Payer: COMMERCIAL

## 2024-06-12 ENCOUNTER — HOSPITAL ENCOUNTER (OUTPATIENT)
Dept: POSTOP/PACU | Age: 69
Discharge: HOME | End: 2024-06-12
Payer: MEDICARE

## 2024-06-12 VITALS
OXYGEN SATURATION: 96 % | SYSTOLIC BLOOD PRESSURE: 121 MMHG | HEART RATE: 55 BPM | TEMPERATURE: 97.1 F | RESPIRATION RATE: 14 BRPM | DIASTOLIC BLOOD PRESSURE: 73 MMHG

## 2024-06-12 PROCEDURE — 64447 NJX AA&/STRD FEMORAL NRV IMG: CPT | Performed by: ANESTHESIOLOGY

## 2024-06-12 PROCEDURE — 3700000000 HC ANESTHESIA ATTENDED CARE: Performed by: ANESTHESIOLOGY

## 2024-06-12 PROCEDURE — 2500000003 HC RX 250 WO HCPCS

## 2024-06-12 PROCEDURE — 7100000011 HC PHASE II RECOVERY - ADDTL 15 MIN: Performed by: ANESTHESIOLOGY

## 2024-06-12 PROCEDURE — 6360000002 HC RX W HCPCS: Performed by: ANESTHESIOLOGY

## 2024-06-12 PROCEDURE — 6370000000 HC RX 637 (ALT 250 FOR IP): Performed by: ANESTHESIOLOGY

## 2024-06-12 PROCEDURE — 3700000001 HC ADD 15 MINUTES (ANESTHESIA): Performed by: ANESTHESIOLOGY

## 2024-06-12 PROCEDURE — 2580000003 HC RX 258: Performed by: ANESTHESIOLOGY

## 2024-06-12 PROCEDURE — 7100000010 HC PHASE II RECOVERY - FIRST 15 MIN: Performed by: ANESTHESIOLOGY

## 2024-06-12 PROCEDURE — 7100000000 HC PACU RECOVERY - FIRST 15 MIN: Performed by: ANESTHESIOLOGY

## 2024-06-12 PROCEDURE — 3600000500 HC JOINT MANIPULATION: Performed by: ANESTHESIOLOGY

## 2024-06-12 PROCEDURE — 7100000001 HC PACU RECOVERY - ADDTL 15 MIN: Performed by: ANESTHESIOLOGY

## 2024-06-12 PROCEDURE — 6360000002 HC RX W HCPCS

## 2024-06-12 PROCEDURE — C9290 INJ, BUPIVACAINE LIPOSOME: HCPCS | Performed by: ANESTHESIOLOGY

## 2024-06-12 RX ORDER — PROCHLORPERAZINE EDISYLATE 5 MG/ML
5 INJECTION INTRAMUSCULAR; INTRAVENOUS
Status: DISCONTINUED | OUTPATIENT
Start: 2024-06-12 | End: 2024-06-13 | Stop reason: HOSPADM

## 2024-06-12 RX ORDER — FENTANYL CITRATE 50 UG/ML
25 INJECTION, SOLUTION INTRAMUSCULAR; INTRAVENOUS EVERY 5 MIN PRN
Status: DISCONTINUED | OUTPATIENT
Start: 2024-06-12 | End: 2024-06-13 | Stop reason: HOSPADM

## 2024-06-12 RX ORDER — ACETAMINOPHEN 325 MG/1
650 TABLET ORAL
Status: DISCONTINUED | OUTPATIENT
Start: 2024-06-12 | End: 2024-06-13 | Stop reason: HOSPADM

## 2024-06-12 RX ORDER — PROPOFOL 10 MG/ML
INJECTION, EMULSION INTRAVENOUS PRN
Status: DISCONTINUED | OUTPATIENT
Start: 2024-06-12 | End: 2024-06-12 | Stop reason: SDUPTHER

## 2024-06-12 RX ORDER — LABETALOL HYDROCHLORIDE 5 MG/ML
10 INJECTION, SOLUTION INTRAVENOUS
Status: DISCONTINUED | OUTPATIENT
Start: 2024-06-12 | End: 2024-06-13 | Stop reason: HOSPADM

## 2024-06-12 RX ORDER — FENTANYL CITRATE 50 UG/ML
INJECTION, SOLUTION INTRAMUSCULAR; INTRAVENOUS PRN
Status: DISCONTINUED | OUTPATIENT
Start: 2024-06-12 | End: 2024-06-12 | Stop reason: SDUPTHER

## 2024-06-12 RX ORDER — SODIUM CHLORIDE 0.9 % (FLUSH) 0.9 %
5-40 SYRINGE (ML) INJECTION PRN
Status: DISCONTINUED | OUTPATIENT
Start: 2024-06-12 | End: 2024-06-13 | Stop reason: HOSPADM

## 2024-06-12 RX ORDER — BUPIVACAINE HYDROCHLORIDE 5 MG/ML
INJECTION, SOLUTION EPIDURAL; INTRACAUDAL
Status: DISCONTINUED | OUTPATIENT
Start: 2024-06-12 | End: 2024-06-12 | Stop reason: SDUPTHER

## 2024-06-12 RX ORDER — HYDRALAZINE HYDROCHLORIDE 20 MG/ML
10 INJECTION INTRAMUSCULAR; INTRAVENOUS
Status: DISCONTINUED | OUTPATIENT
Start: 2024-06-12 | End: 2024-06-13 | Stop reason: HOSPADM

## 2024-06-12 RX ORDER — OXYCODONE HYDROCHLORIDE 5 MG/1
5 TABLET ORAL PRN
Status: COMPLETED | OUTPATIENT
Start: 2024-06-12 | End: 2024-06-12

## 2024-06-12 RX ORDER — BUPIVACAINE HYDROCHLORIDE 5 MG/ML
INJECTION, SOLUTION EPIDURAL; INTRACAUDAL
Status: COMPLETED
Start: 2024-06-12 | End: 2024-06-12

## 2024-06-12 RX ORDER — LIDOCAINE HYDROCHLORIDE 20 MG/ML
INJECTION, SOLUTION INFILTRATION; PERINEURAL PRN
Status: DISCONTINUED | OUTPATIENT
Start: 2024-06-12 | End: 2024-06-12 | Stop reason: SDUPTHER

## 2024-06-12 RX ORDER — OXYCODONE HYDROCHLORIDE 5 MG/1
10 TABLET ORAL PRN
Status: COMPLETED | OUTPATIENT
Start: 2024-06-12 | End: 2024-06-12

## 2024-06-12 RX ORDER — IPRATROPIUM BROMIDE AND ALBUTEROL SULFATE 2.5; .5 MG/3ML; MG/3ML
1 SOLUTION RESPIRATORY (INHALATION)
Status: DISCONTINUED | OUTPATIENT
Start: 2024-06-12 | End: 2024-06-13 | Stop reason: HOSPADM

## 2024-06-12 RX ORDER — SODIUM CHLORIDE 9 MG/ML
INJECTION, SOLUTION INTRAVENOUS PRN
Status: DISCONTINUED | OUTPATIENT
Start: 2024-06-12 | End: 2024-06-13 | Stop reason: HOSPADM

## 2024-06-12 RX ORDER — FENTANYL CITRATE 50 UG/ML
INJECTION, SOLUTION INTRAMUSCULAR; INTRAVENOUS
Status: COMPLETED
Start: 2024-06-12 | End: 2024-06-12

## 2024-06-12 RX ORDER — SODIUM CHLORIDE, SODIUM LACTATE, POTASSIUM CHLORIDE, CALCIUM CHLORIDE 600; 310; 30; 20 MG/100ML; MG/100ML; MG/100ML; MG/100ML
INJECTION, SOLUTION INTRAVENOUS CONTINUOUS
Status: DISCONTINUED | OUTPATIENT
Start: 2024-06-12 | End: 2024-06-13 | Stop reason: HOSPADM

## 2024-06-12 RX ORDER — OXYCODONE HYDROCHLORIDE 5 MG/1
5 TABLET ORAL ONCE
Status: DISCONTINUED | OUTPATIENT
Start: 2024-06-12 | End: 2024-06-13 | Stop reason: HOSPADM

## 2024-06-12 RX ORDER — SODIUM CHLORIDE 0.9 % (FLUSH) 0.9 %
5-40 SYRINGE (ML) INJECTION EVERY 12 HOURS SCHEDULED
Status: DISCONTINUED | OUTPATIENT
Start: 2024-06-12 | End: 2024-06-13 | Stop reason: HOSPADM

## 2024-06-12 RX ORDER — MEPERIDINE HYDROCHLORIDE 25 MG/ML
12.5 INJECTION INTRAMUSCULAR; INTRAVENOUS; SUBCUTANEOUS EVERY 5 MIN PRN
Status: DISCONTINUED | OUTPATIENT
Start: 2024-06-12 | End: 2024-06-13 | Stop reason: HOSPADM

## 2024-06-12 RX ORDER — NALOXONE HYDROCHLORIDE 0.4 MG/ML
INJECTION, SOLUTION INTRAMUSCULAR; INTRAVENOUS; SUBCUTANEOUS PRN
Status: DISCONTINUED | OUTPATIENT
Start: 2024-06-12 | End: 2024-06-13 | Stop reason: HOSPADM

## 2024-06-12 RX ORDER — HYDROMORPHONE HYDROCHLORIDE 1 MG/ML
0.5 INJECTION, SOLUTION INTRAMUSCULAR; INTRAVENOUS; SUBCUTANEOUS EVERY 5 MIN PRN
Status: DISCONTINUED | OUTPATIENT
Start: 2024-06-12 | End: 2024-06-13 | Stop reason: HOSPADM

## 2024-06-12 RX ORDER — ONDANSETRON 2 MG/ML
4 INJECTION INTRAMUSCULAR; INTRAVENOUS
Status: DISCONTINUED | OUTPATIENT
Start: 2024-06-12 | End: 2024-06-13 | Stop reason: HOSPADM

## 2024-06-12 RX ADMIN — FENTANYL CITRATE 50 MCG: 50 INJECTION, SOLUTION INTRAMUSCULAR; INTRAVENOUS at 07:51

## 2024-06-12 RX ADMIN — SODIUM CHLORIDE, POTASSIUM CHLORIDE, SODIUM LACTATE AND CALCIUM CHLORIDE: 600; 310; 30; 20 INJECTION, SOLUTION INTRAVENOUS at 06:24

## 2024-06-12 RX ADMIN — LIDOCAINE HYDROCHLORIDE 100 MG: 20 INJECTION, SOLUTION INFILTRATION; PERINEURAL at 07:48

## 2024-06-12 RX ADMIN — FENTANYL CITRATE 50 MCG: 50 INJECTION, SOLUTION INTRAMUSCULAR; INTRAVENOUS at 07:48

## 2024-06-12 RX ADMIN — BUPIVACAINE HYDROCHLORIDE 20 ML: 5 INJECTION, SOLUTION EPIDURAL; INTRACAUDAL; PERINEURAL at 08:22

## 2024-06-12 RX ADMIN — PROPOFOL 50 MG: 10 INJECTION, EMULSION INTRAVENOUS at 07:49

## 2024-06-12 RX ADMIN — PROPOFOL 100 MG: 10 INJECTION, EMULSION INTRAVENOUS at 07:48

## 2024-06-12 RX ADMIN — PROPOFOL 50 MG: 10 INJECTION, EMULSION INTRAVENOUS at 07:50

## 2024-06-12 RX ADMIN — BUPIVACAINE 10 ML: 13.3 INJECTION, SUSPENSION, LIPOSOMAL INFILTRATION at 08:22

## 2024-06-12 RX ADMIN — OXYCODONE HYDROCHLORIDE 10 MG: 5 TABLET ORAL at 08:30

## 2024-06-12 ASSESSMENT — PAIN - FUNCTIONAL ASSESSMENT: PAIN_FUNCTIONAL_ASSESSMENT: 0-10

## 2024-06-12 ASSESSMENT — PAIN SCALES - GENERAL
PAINLEVEL_OUTOF10: 3
PAINLEVEL_OUTOF10: 4

## 2024-06-12 ASSESSMENT — LIFESTYLE VARIABLES: SMOKING_STATUS: 0

## 2024-06-12 ASSESSMENT — COPD QUESTIONNAIRES: CAT_SEVERITY: MILD

## 2024-06-12 ASSESSMENT — PAIN DESCRIPTION - DESCRIPTORS: DESCRIPTORS: ACHING

## 2024-06-12 NOTE — ANESTHESIA POSTPROCEDURE EVALUATION
Department of Anesthesiology  Postprocedure Note    Patient: Galindo Dickson  MRN: [id number]  YOB: 1955  Date of evaluation: 6/12/2024    Procedure Summary       Date: 06/12/24 Room / Location: [facility] PACU    Anesthesia Start: 0730 Anesthesia Stop: 0759    Procedure: JOINT MANIPULATION Diagnosis:     Scheduled Providers: Kelby Calhoun DO Responsible Provider: Kelby Calhoun DO    Anesthesia Type: MAC, regional ASA Status: 3            Anesthesia Type: No value filed.    Meeterio Phase I: Emeterio Score: 10    Emeterio Phase II:      Anesthesia Post Evaluation    Patient location during evaluation: PACU  Patient participation: complete - patient participated  Level of consciousness: awake  Pain score: 4  Airway patency: patent  Nausea & Vomiting: no nausea and no vomiting  Cardiovascular status: hemodynamically stable  Respiratory status: acceptable  Hydration status: stable  Multimodal analgesia pain management approach  Pain management: adequate    No notable events documented.

## 2024-06-12 NOTE — PROGRESS NOTES
Ambulatory Surgery/Procedure Discharge Note    Vitals:    06/12/24 0845   BP: 121/73   Pulse: 55   Resp: 14   Temp: 97.1 °F (36.2 °C)   SpO2: 96%       In: 1840 [P.O.:240; I.V.:1600]  Out: 100 [Urine:100]    Restroom use offered before discharge.  Yes    Pain assessment:  level of pain (1-10, 10 severe),   Pain Level: 3    Patient discharged to home with family.     Patient discharged to home/self care. Patient discharged via wheel chair by transporter to waiting family/S.O.       6/12/2024 9:13 AM

## 2024-06-12 NOTE — ANESTHESIA PROCEDURE NOTES
Peripheral Block    Patient location during procedure: PACU  Reason for block: post-op pain management and at surgeon's request  Start time: 6/12/2024 8:22 AM  End time: 6/12/2024 8:27 AM  Staffing  Performed: anesthesiologist   Anesthesiologist: Kelby Calhoun DO  Performed by: Kelby Calhoun DO  Authorized by: Kelby Calhoun DO    Preanesthetic Checklist  Completed: patient identified, IV checked, site marked, risks and benefits discussed, surgical/procedural consents, equipment checked, pre-op evaluation, timeout performed, anesthesia consent given, oxygen available, monitors applied/VS acknowledged, fire risk safety assessment completed and verbalized and blood product R/B/A discussed and consented  Peripheral Block   Patient position: supine  Prep: ChloraPrep  Provider prep: mask and sterile gloves  Patient monitoring: cardiac monitor, continuous pulse ox, continuous capnometry, frequent blood pressure checks, oxygen, IV access and responsive to questions  Block type: Femoral  Adductor canal  Laterality: left  Injection technique: single-shot  Guidance: ultrasound guided    Needle   Needle type: insulated echogenic nerve stimulator needle   Needle gauge: 20 G  Needle localization: anatomical landmarks and ultrasound guidance  Test dose: negative  Needle length: 10 cm  Assessment   Injection assessment: negative aspiration for heme, no paresthesia on injection, local visualized surrounding nerve on ultrasound, no intravascular symptoms and low pressure verified by pressure monitor  Paresthesia pain: none  Slow fractionated injection: yes  Hemodynamics: stable  Outcomes: uncomplicated and patient tolerated procedure well    Additional Notes  Done in PACU after attempted knee manipulation  Medications Administered  BUPivacaine (MARCAINE) PF injection 0.5% - Perineural   20 mL - 6/12/2024 8:22:00 AM  BUPivacaine liposome (EXPAREL) injection 1.3% - Perineural   10 mL - 6/12/2024 8:22:00 AM

## 2024-06-12 NOTE — PROGRESS NOTES
PACU Transfer Note    Vitals:    06/12/24 0835   BP: 124/74   Pulse: 68   Resp: 13   Temp: 97.7 °F (36.5 °C)   SpO2: 99%       In: 1840 [P.O.:240; I.V.:1600]  Out: 100 [Urine:100]    Pain assessment:  present - adequately treated  Pain Level: 3    Report given to Receiving unit RN.    6/12/2024 8:43 AM

## 2024-06-12 NOTE — PROGRESS NOTES
Patient received from PACU patient alert and oriented X3 with no complaints of pain at this time.

## 2024-06-12 NOTE — DISCHARGE INSTRUCTIONS
PATIENT INSTRUCTIONS FOLLOWING MANIPULATION      1. Elevate the operated area above heart level and apply ice bag 20 minutes of every hour to operative extremity.  2. Any dressing you may have will be removed in the clinic / PT. DO NOT remove sutures, staples or Steri-strips if you have any.  3. Use crutches as needed.  4. Weight bearing with crutches for assistance to operative extremity.  5. Ankle pumps frequently  6. Follow up with previously scheduled appointment or call the office for an appointment if you do not already have one.  7. Call your doctor if:   Your incision becomes red, swollen or develops drainage  If the wound becomes increasingly painful uncontrolled with the pain medications.   If you develop fever greater than 101 degrees after the first 48 hours.  8. Please call with questions/concerns.      Board Certified Orthopaedic Surgeon  President and Medical Director  [facility]    Your Surgeon or Anesthesiologist gave you [facility] AMBULATORY PROCEDURE DISCHARGE INSTRUCTIONS    There are potential side effects of anesthesia or sedation you may experience for the first 24 hours.  These side effects include:    Confusion or Memory loss, Dizziness, or Delayed Reaction Times   [x]A responsible person should be with you for the next 24 hours.  Do not operate any vehicles (automobiles, bicycles, motorcycles) or power tools or machinery for 24 hours.  Do not sign any legal documents or make any legal decisions for 24 hours. Do not drink alcohol for 24 hours or while taking narcotic pain medication.      Nausea    [x]Start with light diet and progress to your normal diet as you feel like eating. However, if you experience nausea or repeated episodes of vomiting which persist beyond 12-24 hours, notify your physician.  Once nausea has passed, remember to keep drinking fluids.    Difficulty Passing Urine  [x]Drink extra amounts of fluid

## 2024-06-12 NOTE — PROGRESS NOTES
0759 hand off from anesthesia complete report received of meds given patient asleep still. No SS of pain at this time. Dr Sutherland unable to preform procedure due to scar tissue build up

## 2024-06-12 NOTE — ANESTHESIA PRE PROCEDURE
Cancer (HCC) 2022, 22    Prostate, Appendix   • Cancer of appendix (HCC)        • Cataract    • COPD     mild- no inhalers   • Elevated liver function tests    • Giardiasis    • Hx of smoking    • Hyperlipidemia    • Kidney stone    • Marijuana smoker    • Prostate cancer (HCC) 2023   • Prostate cancer (HCC)        Past Surgical History:        Procedure Laterality Date   • APPENDECTOMY  2022   • CARDIOVASCULAR STRESS TEST  2008    negative   • CARDIOVASCULAR STRESS TEST  2010    JOANNK   • COLONOSCOPY  2004    Dr. Lechuga   • COLONOSCOPY  2008    Dr. Lechuga   • COLONOSCOPY  2013   • EYE SURGERY      Cataract   • OTHER SURGICAL HISTORY Left 2015    LEFT KNEE OPEN PERONEAL NERVE DECOMPRESSION   • SKIN BIOPSY  2011    telangiectasia, benign   • TOTAL KNEE ARTHROPLASTY Left 2024    LEFT TOTAL KNEE  ARTHROPLASTY performed by Brian Sutherland MD at [facility]       Social History:    Social History     Tobacco Use   • Smoking status: Former     Current packs/day: 0.00     Average packs/day: 0.5 packs/day for 15.0 years (7.5 ttl pk-yrs)     Types: Cigarettes     Quit date: 2022     Years since quittin.1   • Smokeless tobacco: Never   Substance Use Topics   • Alcohol use: Not Currently     Alcohol/week: 4.0 standard drinks of alcohol     Types: 4 Drinks containing 0.5 oz of alcohol per week     Comment: OCCASSION                                Counseling given: Not Answered      Vital Signs (Current):   Vitals:    24 0628   BP: 125/79   Pulse: 64   Resp: 16   Temp: 97.2 °F (36.2 °C)   TempSrc: Temporal   SpO2: 98%                                              BP Readings from Last 3 Encounters:   24 125/79   24 122/75   24 125/75       NPO Status: Time of last liquid consumption:                         Time of last solid consumption:                         Date of last liquid consumption: 24

## 2024-06-12 NOTE — OP NOTE
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    OPERATIVE PROCEDURE:  Gentle manipulation of left knee under chemical analgesia.    OPERATIVE INDICATIONS:  This patient did undergo left total joint replacement.  He has had a marked occurrence of arthrofibrosis within the first 3 weeks with limitation of 0 to 70 degrees motion.  There is no evidence of infection, just an accelerated inflammatory scar response.  He will have to be observed very carefully for the indications for this, but at the present time, it appears to be a primary arthrofibrosis without secondary causative effects.  He did undergo a prior manipulation and I did obtain up to 115 degrees knee flexion, but he was not able to keep that and within 2 weeks he reverted back to 70 degrees.  This represents the last attempt to gain motion utilizing gentle manipulation procedures.    DESCRIPTION OF PROCEDURE:  With the patient under an adequate level of chemical analgesia and after identification of the signed limb in the time-out procedure, a very gentle manipulation was performed from 0 to 70 degrees.  A very hard stop was immediately encountered at approximately 80 degrees and it was not felt safe to proceed after this level.  There was marked contracture of the medial and lateral retinacula and marked decrease in patellar mobility.  Accordingly, any further high-pressure manipulation was abandoned and not indicated.  This patient

## 2024-06-14 ENCOUNTER — PREP FOR PROCEDURE (OUTPATIENT)
Dept: ORTHOPEDIC SURGERY | Age: 69
End: 2024-06-14

## 2024-06-14 ENCOUNTER — HOSPITAL ENCOUNTER (OUTPATIENT)
Dept: PHYSICAL THERAPY | Age: 69
Setting detail: THERAPIES SERIES
Discharge: HOME | End: 2024-06-14
Payer: MEDICARE

## 2024-06-14 PROCEDURE — 97110 THERAPEUTIC EXERCISES: CPT | Performed by: PHYSICAL THERAPIST

## 2024-06-14 PROCEDURE — 97140 MANUAL THERAPY 1/> REGIONS: CPT | Performed by: PHYSICAL THERAPIST

## 2024-06-14 PROCEDURE — 97016 VASOPNEUMATIC DEVICE THERAPY: CPT | Performed by: PHYSICAL THERAPIST

## 2024-06-14 RX ORDER — KETOROLAC TROMETHAMINE 30 MG/ML
15 INJECTION, SOLUTION INTRAMUSCULAR; INTRAVENOUS ONCE
Status: CANCELLED | OUTPATIENT
Start: 2024-06-14 | End: 2024-06-14

## 2024-06-14 NOTE — FLOWSHEET NOTE
(42516)     Other:    Other:    Total Timed Code Tx Minutes 47 4  1     Total Treatment Minutes 8:00-9:30  90'       Charge Justification:  (15355) THERAPEUTIC EXERCISE - Provided verbal/tactile cueing for activities related to strengthening, flexibility, endurance, ROM performed to prevent loss of range of motion, maintain or improve muscular strength or increase flexibility, following either an injury or surgery.   (11917) HOME EXERCISE PROGRAM - Reviewed/Progressed HEP activities related to strengthening, flexibility, endurance, ROM performed to prevent loss of range of motion, maintain or improve muscular strength or increase flexibility, following either an injury or surgery.  (88133) MANUAL THERAPY -  Manual therapy techniques, 1 or more regions, each 15 minutes (Mobilization/manipulation, manual lymphatic drainage, manual traction) for the purpose of modulating pain, promoting relaxation,  increasing ROM, reducing/eliminating soft tissue swelling/inflammation/restriction, improving soft tissue extensibility and allowing for proper ROM for normal function with self care, mobility, lifting and ambulation  (70642) VASOPNEUMATIC  (91444) ADL - Provided self-care/home management training related to activities of daily living and compensatory training, and/or use of adaptive equipment for improvement with: ADLs and compensatory training, meal preparation, safety procedures and instruction in use of adaptive equipment, including bathing, grooming, dressing, personal hygiene, basic household cleaning and chores.       GOALS     GOALS:  Patient stated goal: Return to PLOF without restriction.  [] Progressing: [] Met: [] Not Met: [] Adjusted    Therapist goals for Patient:   Short Term Goals: To be achieved in: 1-2 visit(s)  1. Independent in HEP and progression per patient tolerance, in order to prevent re-injury.   [] Progressing: [] Met: [] Not Met: [] Adjusted  2. All patient questions regarding expectations for

## 2024-06-17 ENCOUNTER — HOSPITAL ENCOUNTER (OUTPATIENT)
Dept: PHYSICAL THERAPY | Age: 69
Setting detail: THERAPIES SERIES
Discharge: HOME | End: 2024-06-17
Payer: MEDICARE

## 2024-06-17 DIAGNOSIS — Z96.652 S/P TOTAL KNEE ARTHROPLASTY, LEFT: Primary | ICD-10-CM

## 2024-06-17 PROCEDURE — 97110 THERAPEUTIC EXERCISES: CPT | Performed by: PHYSICAL THERAPY ASSISTANT

## 2024-06-17 PROCEDURE — 97016 VASOPNEUMATIC DEVICE THERAPY: CPT | Performed by: PHYSICAL THERAPY ASSISTANT

## 2024-06-17 PROCEDURE — 97140 MANUAL THERAPY 1/> REGIONS: CPT | Performed by: PHYSICAL THERAPY ASSISTANT

## 2024-06-17 RX ORDER — OXYCODONE HYDROCHLORIDE 5 MG/1
5-10 TABLET ORAL EVERY 4 HOURS PRN
Qty: 60 TABLET | Refills: 0 | Status: SHIPPED | OUTPATIENT
Start: 2024-06-17 | End: 2024-06-22

## 2024-06-17 NOTE — FLOWSHEET NOTE
increasing ROM, and reduce/eliminate soft tissue swelling/inflammation/restriction.Patient will continue to benefit from ongoing evaluation and advanced clinical decision from a Physical Therapist to address and improve pain control, ROM, muscle strength, neuromuscular control, and ADL status to safely return to PLOF without symptoms or restrictions. and Patient had fair  tolerance to today's session, reporting improved ROM, increased pain, and challenge with program completed. Able to progress  intensity on functional strengthening and gait. Continues to display deficits in stiffness, weakness, decreased NM control, and decreased balance control which required ongoing skilled physical therapy and decision making.       Medical Necessity Documentation:  I certify that this patient meets the below criteria necessary for medical necessity for care and/or justification of therapy services:  The patient has functional impairments and/or activity limitations and would benefit from continued outpatient therapy services to address the deficits outlined in the patients goals  The patient has a musculoskeletal condition(s) with a corresponding ICD-10 code that is of complexity and severity that require skilled therapeutic intervention. This has a direct and significant impact on the need for therapy and significantly impacts the rate of recovery.       Return to Play: NA    Prognosis for POC: [x] Good [] Fair  [] Poor    Patient requires continued skilled intervention: [x] Yes  [] No      CHARGE CAPTURE     PT CHARGE GRID   CPT Code (TIMED) minutes # CPT Code (UNTIMED) #     Therex (43667)  39 2  EVAL:LOW (97020 - Typically 20 minutes face-to-face)     Neuromusc. Re-ed (42097)    Re-Eval (72674)     Manual (06384) 11 1  Estim Unattended (01954)     Ther. Act (44897)    [facility] Traction (16485)     Gait (35515)    Dry Needle 1-2 muscle (20560)     Aquatic Therex (09313)    Dry Needle 3+ muscle (20561)     Iontophoresis (51488)

## 2024-06-18 RX ORDER — SODIUM CHLORIDE 9 MG/ML
INJECTION, SOLUTION INTRAVENOUS PRN
Status: CANCELLED | OUTPATIENT
Start: 2024-06-26

## 2024-06-18 RX ORDER — SODIUM CHLORIDE 0.9 % (FLUSH) 0.9 %
5-40 SYRINGE (ML) INJECTION PRN
Status: CANCELLED | OUTPATIENT
Start: 2024-06-26

## 2024-06-18 RX ORDER — ACETAMINOPHEN 325 MG/1
1000 TABLET ORAL ONCE
Status: CANCELLED | OUTPATIENT
Start: 2024-06-26 | End: 2024-06-18

## 2024-06-18 RX ORDER — SODIUM CHLORIDE 0.9 % (FLUSH) 0.9 %
5-40 SYRINGE (ML) INJECTION EVERY 12 HOURS SCHEDULED
Status: CANCELLED | OUTPATIENT
Start: 2024-06-26

## 2024-06-19 ENCOUNTER — TELEPHONE (OUTPATIENT)
Dept: ORTHOPEDIC SURGERY | Age: 69
End: 2024-06-19

## 2024-06-19 NOTE — TELEPHONE ENCOUNTER
General Question     Subject: PRIOR AUTHORIZATION  Patient and /or Facility Request: Galindo Dickson \"[place]\"   Contact Number: [phone]    ZACHARY WITH [facility] CALLED REGARDING A PA THAT WAS DENIED FOR THE PATIENT LT KNEE    HE STATED [facility] CANCELED THIS DUE TO THIS BEING DUPLICATED    PA # [id number]    PLEASE CALL BACK THE ABOVE NUMBER

## 2024-06-20 NOTE — PROGRESS NOTES
6/20/2024 1010 AM:    TI COMPLETED/TS  USE H&P FROM  IN EPIC NOTES 5/28/2024, LABS AND EKG TRACING DONE IN EPIC 4/11/2024/TS

## 2024-06-20 NOTE — PROGRESS NOTES
6/20/2024 1011 AM:        [facility] PRE-SURGICAL TESTING INSTRUCTIONS                      PRIOR TO PROCEDURE DATE:    1. PLEASE FOLLOW ANY INSTRUCTIONS GIVEN TO YOU PER YOUR SURGEON.      2. Arrange for someone to drive you home and be with you for the first 24 hours after discharge for your safety after your procedure for which you received sedation. Ensure it is someone we can share information with regarding your discharge.     NOTE: At this time ONLY 2 ADULTS may accompany you. NO CHILDREN UNDER AGE OF 16.    One person ENCOURAGED to stay at hospital entire time if outpatient surgery      3. You must contact your surgeon for instructions IF:  You are taking any blood thinners, aspirin, anti-inflammatory or vitamins.   Contact your ordering physician/surgeon for medication instructions as soon as possible, especially if taking blood thinners, aspirin, heart, or diabetic medication. STOP SUPPLEMENTS/VITAMINS/NON-STEROIDAL ANTI-INFLAMMATORY MEDICATION 7 DAYS PRIOR TO PROCEDURE.  There is a change in your physical condition such as a cold, fever, rash, cuts, sores, or any other infection, especially near your surgical site.    4. Do not drink alcohol the day before or day of your procedure.  Do not use any recreational marijuana at least 24 hours or street drugs (heroin, cocaine) at minimum 5 days prior to your procedure.     5. A Pre-Surgical History and Physical MUST be completed WITHIN 30 DAYS OR LESS prior to your procedure.by your Physician or an Urgent Care        THE DAY OF YOUR PROCEDURE:  1.  Follow instructions for ARRIVAL TIME as DIRECTED BY YOUR SURGEON. [address] [address]     2. Enter the MAIN entrance from [facility] and follow the signs to the free Parking Garage or  Parking (offered free of charge 7 am-5pm).      3. Enter the Main Entrance of the hospital (do not enter from the lower level of the parking garage). Upon entrance, check in with

## 2024-06-21 ENCOUNTER — HOSPITAL ENCOUNTER (OUTPATIENT)
Dept: PHYSICAL THERAPY | Age: 69
Setting detail: THERAPIES SERIES
Discharge: HOME | End: 2024-06-21
Payer: MEDICARE

## 2024-06-21 PROCEDURE — 97140 MANUAL THERAPY 1/> REGIONS: CPT | Performed by: PHYSICAL THERAPIST

## 2024-06-21 PROCEDURE — 97016 VASOPNEUMATIC DEVICE THERAPY: CPT | Performed by: PHYSICAL THERAPIST

## 2024-06-21 PROCEDURE — 97110 THERAPEUTIC EXERCISES: CPT | Performed by: PHYSICAL THERAPIST

## 2024-06-21 NOTE — FLOWSHEET NOTE
[facility]- Outpatient Rehabilitation and Therapy [address], [place] [address] office: [phone] fax: [phone]          Physical Therapy: TREATMENT/PROGRESS NOTE   Patient: Galindo Dickson (68 y.o. male)   Examination Date: 2024   :  1955 MRN: [id number]   Visit #: 5   Insurance Allowable Auth Needed   [place] []Yes    [x]No    Insurance: Payor: MEDICARE / Plan: MEDICARE PART A AND B / Product Type: *No Product type* /   Insurance ID: [id number] - (Medicare)  Secondary Insurance (if applicable): [facility]   Treatment Diagnosis:     ICD-10-CM    1. Left knee pain, unspecified chronicity  M25.562       2. Weakness  R53.1       3. Gait abnormality  R26.9          Medical Diagnosis:  Left knee pain [M25.562]  S/P L TKR & MCL repair 24  S/P RICO + qpump 24   Referring Physician: Brian Sutherland MD  PCP: Tamiko Conteh MD       Plan of care signed (Y/N): Y    Date of Patient follow up with Physician: Patient was seen in conjunction with Dr. Brian Sutherland to establish initial/subsequent patient care plan.    24: seen by JAVIER Corona - had to do a full repair of MCL with anchors and sutures along the length of the MCL; fig-4 ROM to 90 deg max for the first 2 weeks at least; thigh discomfort is due to tourniquet; okay to take tylenol between doses of oxycodone, tylenol PM at night or 2 tabs of oxycodone at bedtime to help get more longevity and better sleep  24: seen in MD office; RICO scheduled for 24: med discussion, RICO tomorrow, MDP will be prescribed again tomorrow  24: seen by Shai - leave Qpump in until it runs out; immobilizer can be d/c when qpump is done; push ROM     Progress Report/POC: NO  POC update due: (10 visits /OR AUTH LIMITS, whichever is less)  2024                                             Precautions/ Contra-indications:           Latex allergy:  NO  Pacemaker:    NO  Contraindications for

## 2024-06-24 ENCOUNTER — TELEPHONE (OUTPATIENT)
Dept: ORTHOPEDIC SURGERY | Age: 69
End: 2024-06-24

## 2024-06-24 ENCOUNTER — HOSPITAL ENCOUNTER (OUTPATIENT)
Dept: PHYSICAL THERAPY | Age: 69
Setting detail: THERAPIES SERIES
Discharge: HOME | End: 2024-06-24
Payer: MEDICARE

## 2024-06-24 DIAGNOSIS — Z96.652 S/P TOTAL KNEE ARTHROPLASTY, LEFT: Primary | ICD-10-CM

## 2024-06-24 PROCEDURE — 97140 MANUAL THERAPY 1/> REGIONS: CPT | Performed by: PHYSICAL THERAPIST

## 2024-06-24 PROCEDURE — 97110 THERAPEUTIC EXERCISES: CPT | Performed by: PHYSICAL THERAPIST

## 2024-06-24 PROCEDURE — 97016 VASOPNEUMATIC DEVICE THERAPY: CPT | Performed by: PHYSICAL THERAPIST

## 2024-06-24 RX ORDER — OXYCODONE HYDROCHLORIDE 5 MG/1
5-10 TABLET ORAL EVERY 4 HOURS PRN
Qty: 60 TABLET | Refills: 0 | Status: ON HOLD | OUTPATIENT
Start: 2024-06-24 | End: 2024-06-26 | Stop reason: HOSPADM

## 2024-06-24 NOTE — PROGRESS NOTES
noted a 30 MED of opioids due to the fact that he has undergone major orthopaedic surgery as outlined in rule 4731-11-13.  Dosages and further duration of the pain medication will be re-evaluated at his post op visit.  Patient was educated on dosing expectations and limits of prescribing as a result of the new [facility] rules enacted August 31, 2017.  Please also note that this is not the initial opioid prescription issued to this patient but a continuation of medication utilized during the hospital admission as noted in the medical record.  OARRS report has also been utilized to screen for any abuse history or suspicious activity as outlined in [place] State Law.  All efforts have been taken to prevent abuse potential and misuse of opioid medications.          Shai Nuñez PA-C    Physician Assistant - Certified  Orthopedic Surgery   [facility]    06/24/24 10:59 AM        This dictation was performed with a verbal recognition program (DRAGON) and it was checked for errors.  It is possible that there are still dictated errors within this office note.  If so, please bring any errors to my attention for an addendum.  All efforts were made to ensure that this office note is accurate.

## 2024-06-24 NOTE — FLOWSHEET NOTE
and/or copied from initial evaluation, reassessments and prior notes for documentation efficiency.

## 2024-06-24 NOTE — TELEPHONE ENCOUNTER
THE REFERENCE/AUTH NUMBER IS [id number] per [facility].     Auth paperwork should be received via fax today in [facility] office.     Called 6/24 @ [facility]. [phone]

## 2024-06-25 ENCOUNTER — OFFICE VISIT (OUTPATIENT)
Dept: ORTHOPEDIC SURGERY | Age: 69
End: 2024-06-25

## 2024-06-25 VITALS — WEIGHT: 180 LBS | HEIGHT: 69 IN | BODY MASS INDEX: 26.66 KG/M2

## 2024-06-25 DIAGNOSIS — M25.662 DECREASED RANGE OF MOTION OF LEFT KNEE: ICD-10-CM

## 2024-06-25 DIAGNOSIS — M25.562 ACUTE PAIN OF LEFT KNEE: ICD-10-CM

## 2024-06-25 DIAGNOSIS — M24.662 FIBROSIS OF LEFT KNEE JOINT: ICD-10-CM

## 2024-06-25 DIAGNOSIS — Z01.818 PRE-OP EXAMINATION: Primary | ICD-10-CM

## 2024-06-25 DIAGNOSIS — Z96.652 S/P TOTAL KNEE ARTHROPLASTY, LEFT: ICD-10-CM

## 2024-06-25 PROCEDURE — PREOPEXAM PRE-OP EXAM: Performed by: PHYSICIAN ASSISTANT

## 2024-06-25 NOTE — PROGRESS NOTES
alcohol per week     Comment: OCCASSION    Drug use: Yes     Types: Marijuana (Weed)    Sexual activity: Not Currently     Social Determinants of Health     Financial Resource Strain: Low Risk  (10/17/2022)    Overall Financial Resource Strain (CARDIA)     Difficulty of Paying Living Expenses: Not hard at all   Food Insecurity: No Food Insecurity (5/9/2024)    Hunger Vital Sign     Worried About Running Out of Food in the Last Year: Never true     Ran Out of Food in the Last Year: Never true   Transportation Needs: No Transportation Needs (5/9/2024)    PRAPARE - Transportation     Lack of Transportation (Medical): No     Lack of Transportation (Non-Medical): No   Physical Activity: Sufficiently Active (10/12/2021)    Exercise Vital Sign     Days of Exercise per Week: 5 days     Minutes of Exercise per Session: 50 min   Stress: No Stress Concern Present (10/12/2021)    [employer] [place] [employer] - Occupational Stress Questionnaire     Feeling of Stress : Not at all   Social Connections: Unknown (10/12/2021)    Social Connection and Isolation Panel [NHANES]     Frequency of Communication with Friends and Family: More than three times a week     Frequency of Social Gatherings with Friends and Family: Twice a week     Attends Club or Organization Meetings: Never     Marital Status:    Housing Stability: Low Risk  (5/9/2024)    Housing Stability Vital Sign     Unable to Pay for Housing in the Last Year: No     Number of Places Lived in the Last Year: 2     Unstable Housing in the Last Year: No       Current Outpatient Medications   Medication Sig Dispense Refill    oxyCODONE (ROXICODONE) 5 MG immediate release tablet Take 1-2 tablets by mouth every 4 hours as needed for Pain for up to 5 days. Intended supply: 5 days. Take lowest dose possible to manage pain Max Daily Amount: 60 mg 60 tablet 0    pravastatin (PRAVACHOL) 40 MG tablet Take 1 tablet by mouth daily 90 tablet 0    aspirin 81 MG EC tablet

## 2024-06-26 ENCOUNTER — ANESTHESIA EVENT (OUTPATIENT)
Dept: OPERATING ROOM | Age: 69
End: 2024-06-26
Payer: MEDICARE

## 2024-06-26 ENCOUNTER — HOSPITAL ENCOUNTER (OUTPATIENT)
Age: 69
Setting detail: OUTPATIENT SURGERY
Discharge: HOME | End: 2024-06-26
Attending: ORTHOPAEDIC SURGERY | Admitting: ORTHOPAEDIC SURGERY
Payer: MEDICARE

## 2024-06-26 ENCOUNTER — ANESTHESIA (OUTPATIENT)
Dept: OPERATING ROOM | Age: 69
End: 2024-06-26
Payer: MEDICARE

## 2024-06-26 VITALS
TEMPERATURE: 97.8 F | RESPIRATION RATE: 17 BRPM | OXYGEN SATURATION: 96 % | HEIGHT: 69 IN | DIASTOLIC BLOOD PRESSURE: 68 MMHG | SYSTOLIC BLOOD PRESSURE: 113 MMHG | BODY MASS INDEX: 25.68 KG/M2 | WEIGHT: 173.4 LBS | HEART RATE: 71 BPM

## 2024-06-26 DIAGNOSIS — M17.12 PRIMARY OSTEOARTHRITIS OF LEFT KNEE: ICD-10-CM

## 2024-06-26 DIAGNOSIS — Z98.890 S/P LEFT KNEE ARTHROSCOPY: Primary | ICD-10-CM

## 2024-06-26 PROCEDURE — 7100000001 HC PACU RECOVERY - ADDTL 15 MIN: Performed by: ORTHOPAEDIC SURGERY

## 2024-06-26 PROCEDURE — 87075 CULTR BACTERIA EXCEPT BLOOD: CPT

## 2024-06-26 PROCEDURE — 7100000010 HC PHASE II RECOVERY - FIRST 15 MIN: Performed by: ORTHOPAEDIC SURGERY

## 2024-06-26 PROCEDURE — 6360000002 HC RX W HCPCS: Performed by: ANESTHESIOLOGY

## 2024-06-26 PROCEDURE — 6360000002 HC RX W HCPCS: Performed by: PHYSICIAN ASSISTANT

## 2024-06-26 PROCEDURE — 3700000000 HC ANESTHESIA ATTENDED CARE: Performed by: ORTHOPAEDIC SURGERY

## 2024-06-26 PROCEDURE — 7100000000 HC PACU RECOVERY - FIRST 15 MIN: Performed by: ORTHOPAEDIC SURGERY

## 2024-06-26 PROCEDURE — 6360000002 HC RX W HCPCS: Performed by: ORTHOPAEDIC SURGERY

## 2024-06-26 PROCEDURE — C1776 JOINT DEVICE (IMPLANTABLE): HCPCS | Performed by: ORTHOPAEDIC SURGERY

## 2024-06-26 PROCEDURE — 87116 MYCOBACTERIA CULTURE: CPT

## 2024-06-26 PROCEDURE — 87206 SMEAR FLUORESCENT/ACID STAI: CPT

## 2024-06-26 PROCEDURE — 87102 FUNGUS ISOLATION CULTURE: CPT

## 2024-06-26 PROCEDURE — 2500000003 HC RX 250 WO HCPCS: Performed by: NURSE ANESTHETIST, CERTIFIED REGISTERED

## 2024-06-26 PROCEDURE — 88305 TISSUE EXAM BY PATHOLOGIST: CPT

## 2024-06-26 PROCEDURE — 2580000003 HC RX 258: Performed by: PHYSICIAN ASSISTANT

## 2024-06-26 PROCEDURE — 2580000003 HC RX 258: Performed by: NURSE ANESTHETIST, CERTIFIED REGISTERED

## 2024-06-26 PROCEDURE — 2709999900 HC NON-CHARGEABLE SUPPLY: Performed by: ORTHOPAEDIC SURGERY

## 2024-06-26 PROCEDURE — 3600000014 HC SURGERY LEVEL 4 ADDTL 15MIN: Performed by: ORTHOPAEDIC SURGERY

## 2024-06-26 PROCEDURE — 6370000000 HC RX 637 (ALT 250 FOR IP): Performed by: PHYSICIAN ASSISTANT

## 2024-06-26 PROCEDURE — 87070 CULTURE OTHR SPECIMN AEROBIC: CPT

## 2024-06-26 PROCEDURE — 7100000011 HC PHASE II RECOVERY - ADDTL 15 MIN: Performed by: ORTHOPAEDIC SURGERY

## 2024-06-26 PROCEDURE — 3700000001 HC ADD 15 MINUTES (ANESTHESIA): Performed by: ORTHOPAEDIC SURGERY

## 2024-06-26 PROCEDURE — 6360000002 HC RX W HCPCS: Performed by: NURSE ANESTHETIST, CERTIFIED REGISTERED

## 2024-06-26 PROCEDURE — 87205 SMEAR GRAM STAIN: CPT

## 2024-06-26 PROCEDURE — 2500000003 HC RX 250 WO HCPCS: Performed by: ORTHOPAEDIC SURGERY

## 2024-06-26 PROCEDURE — 2580000003 HC RX 258: Performed by: ANESTHESIOLOGY

## 2024-06-26 PROCEDURE — 3600000004 HC SURGERY LEVEL 4 BASE: Performed by: ORTHOPAEDIC SURGERY

## 2024-06-26 PROCEDURE — 2500000003 HC RX 250 WO HCPCS: Performed by: ANESTHESIOLOGY

## 2024-06-26 RX ORDER — HYDROMORPHONE HYDROCHLORIDE 1 MG/ML
0.25 INJECTION, SOLUTION INTRAMUSCULAR; INTRAVENOUS; SUBCUTANEOUS EVERY 5 MIN PRN
Status: DISCONTINUED | OUTPATIENT
Start: 2024-06-26 | End: 2024-06-26 | Stop reason: HOSPADM

## 2024-06-26 RX ORDER — HYDROMORPHONE HYDROCHLORIDE 2 MG/1
2 TABLET ORAL EVERY 6 HOURS PRN
Qty: 20 TABLET | Refills: 0 | Status: SHIPPED | OUTPATIENT
Start: 2024-06-26 | End: 2024-07-01 | Stop reason: SDUPTHER

## 2024-06-26 RX ORDER — SUCCINYLCHOLINE/SOD CL,ISO/PF 200MG/10ML
SYRINGE (ML) INTRAVENOUS PRN
Status: DISCONTINUED | OUTPATIENT
Start: 2024-06-26 | End: 2024-06-26 | Stop reason: SDUPTHER

## 2024-06-26 RX ORDER — DEXAMETHASONE SODIUM PHOSPHATE 4 MG/ML
INJECTION, SOLUTION INTRA-ARTICULAR; INTRALESIONAL; INTRAMUSCULAR; INTRAVENOUS; SOFT TISSUE PRN
Status: DISCONTINUED | OUTPATIENT
Start: 2024-06-26 | End: 2024-06-26 | Stop reason: SDUPTHER

## 2024-06-26 RX ORDER — SODIUM CHLORIDE, SODIUM LACTATE, POTASSIUM CHLORIDE, AND CALCIUM CHLORIDE .6; .31; .03; .02 G/100ML; G/100ML; G/100ML; G/100ML
IRRIGANT IRRIGATION PRN
Status: DISCONTINUED | OUTPATIENT
Start: 2024-06-26 | End: 2024-06-26 | Stop reason: HOSPADM

## 2024-06-26 RX ORDER — SODIUM CHLORIDE 0.9 % (FLUSH) 0.9 %
5-40 SYRINGE (ML) INJECTION EVERY 12 HOURS SCHEDULED
Status: DISCONTINUED | OUTPATIENT
Start: 2024-06-26 | End: 2024-06-26 | Stop reason: HOSPADM

## 2024-06-26 RX ORDER — SODIUM CHLORIDE 0.9 % (FLUSH) 0.9 %
5-40 SYRINGE (ML) INJECTION PRN
Status: DISCONTINUED | OUTPATIENT
Start: 2024-06-26 | End: 2024-06-26 | Stop reason: HOSPADM

## 2024-06-26 RX ORDER — FENTANYL CITRATE 50 UG/ML
50 INJECTION, SOLUTION INTRAMUSCULAR; INTRAVENOUS EVERY 5 MIN PRN
Status: DISCONTINUED | OUTPATIENT
Start: 2024-06-26 | End: 2024-06-26 | Stop reason: HOSPADM

## 2024-06-26 RX ORDER — ACETAMINOPHEN 325 MG/1
1000 TABLET ORAL ONCE
Status: COMPLETED | OUTPATIENT
Start: 2024-06-26 | End: 2024-06-26

## 2024-06-26 RX ORDER — SODIUM CHLORIDE, SODIUM LACTATE, POTASSIUM CHLORIDE, CALCIUM CHLORIDE 600; 310; 30; 20 MG/100ML; MG/100ML; MG/100ML; MG/100ML
INJECTION, SOLUTION INTRAVENOUS CONTINUOUS
Status: DISCONTINUED | OUTPATIENT
Start: 2024-06-26 | End: 2024-06-26 | Stop reason: HOSPADM

## 2024-06-26 RX ORDER — ROCURONIUM BROMIDE 10 MG/ML
INJECTION, SOLUTION INTRAVENOUS PRN
Status: DISCONTINUED | OUTPATIENT
Start: 2024-06-26 | End: 2024-06-26 | Stop reason: SDUPTHER

## 2024-06-26 RX ORDER — PENICILLIN V POTASSIUM 500 MG/1
500 TABLET ORAL 2 TIMES DAILY
Qty: 28 TABLET | Refills: 0 | Status: SHIPPED | OUTPATIENT
Start: 2024-06-26 | End: 2024-06-27 | Stop reason: ALTCHOICE

## 2024-06-26 RX ORDER — ROPIVACAINE HYDROCHLORIDE 5 MG/ML
INJECTION, SOLUTION EPIDURAL; INFILTRATION; PERINEURAL PRN
Status: DISCONTINUED | OUTPATIENT
Start: 2024-06-26 | End: 2024-06-26 | Stop reason: ALTCHOICE

## 2024-06-26 RX ORDER — METOCLOPRAMIDE HYDROCHLORIDE 5 MG/ML
10 INJECTION INTRAMUSCULAR; INTRAVENOUS
Status: DISCONTINUED | OUTPATIENT
Start: 2024-06-26 | End: 2024-06-26 | Stop reason: HOSPADM

## 2024-06-26 RX ORDER — LABETALOL HYDROCHLORIDE 5 MG/ML
10 INJECTION, SOLUTION INTRAVENOUS
Status: DISCONTINUED | OUTPATIENT
Start: 2024-06-26 | End: 2024-06-26 | Stop reason: HOSPADM

## 2024-06-26 RX ORDER — ONDANSETRON 2 MG/ML
4 INJECTION INTRAMUSCULAR; INTRAVENOUS
Status: DISCONTINUED | OUTPATIENT
Start: 2024-06-26 | End: 2024-06-26 | Stop reason: HOSPADM

## 2024-06-26 RX ORDER — SODIUM CHLORIDE 9 MG/ML
INJECTION, SOLUTION INTRAVENOUS PRN
Status: DISCONTINUED | OUTPATIENT
Start: 2024-06-26 | End: 2024-06-26 | Stop reason: HOSPADM

## 2024-06-26 RX ORDER — FENTANYL CITRATE 50 UG/ML
INJECTION, SOLUTION INTRAMUSCULAR; INTRAVENOUS PRN
Status: DISCONTINUED | OUTPATIENT
Start: 2024-06-26 | End: 2024-06-26 | Stop reason: SDUPTHER

## 2024-06-26 RX ORDER — HYDROMORPHONE HYDROCHLORIDE 2 MG/ML
INJECTION, SOLUTION INTRAMUSCULAR; INTRAVENOUS; SUBCUTANEOUS PRN
Status: DISCONTINUED | OUTPATIENT
Start: 2024-06-26 | End: 2024-06-26 | Stop reason: SDUPTHER

## 2024-06-26 RX ORDER — NALOXONE HYDROCHLORIDE 0.4 MG/ML
INJECTION, SOLUTION INTRAMUSCULAR; INTRAVENOUS; SUBCUTANEOUS PRN
Status: DISCONTINUED | OUTPATIENT
Start: 2024-06-26 | End: 2024-06-26 | Stop reason: HOSPADM

## 2024-06-26 RX ORDER — LIDOCAINE HYDROCHLORIDE 10 MG/ML
1 INJECTION, SOLUTION EPIDURAL; INFILTRATION; INTRACAUDAL; PERINEURAL
Status: DISCONTINUED | OUTPATIENT
Start: 2024-06-26 | End: 2024-06-26 | Stop reason: HOSPADM

## 2024-06-26 RX ORDER — CEPHALEXIN 500 MG/1
500 CAPSULE ORAL 2 TIMES DAILY
Qty: 28 CAPSULE | Refills: 0 | OUTPATIENT
Start: 2024-06-26 | End: 2024-07-10

## 2024-06-26 RX ORDER — MIDAZOLAM HYDROCHLORIDE 1 MG/ML
2 INJECTION INTRAMUSCULAR; INTRAVENOUS
Status: DISCONTINUED | OUTPATIENT
Start: 2024-06-26 | End: 2024-06-26 | Stop reason: HOSPADM

## 2024-06-26 RX ORDER — ONDANSETRON 2 MG/ML
INJECTION INTRAMUSCULAR; INTRAVENOUS PRN
Status: DISCONTINUED | OUTPATIENT
Start: 2024-06-26 | End: 2024-06-26 | Stop reason: SDUPTHER

## 2024-06-26 RX ORDER — LIDOCAINE HYDROCHLORIDE 20 MG/ML
INJECTION, SOLUTION INTRAVENOUS PRN
Status: DISCONTINUED | OUTPATIENT
Start: 2024-06-26 | End: 2024-06-26 | Stop reason: SDUPTHER

## 2024-06-26 RX ORDER — PROPOFOL 10 MG/ML
INJECTION, EMULSION INTRAVENOUS PRN
Status: DISCONTINUED | OUTPATIENT
Start: 2024-06-26 | End: 2024-06-26 | Stop reason: SDUPTHER

## 2024-06-26 RX ADMIN — WATER 2000 MG: 1 INJECTION INTRAMUSCULAR; INTRAVENOUS; SUBCUTANEOUS at 16:57

## 2024-06-26 RX ADMIN — PROPOFOL 130 MG: 10 INJECTION, EMULSION INTRAVENOUS at 16:53

## 2024-06-26 RX ADMIN — HYDROMORPHONE HYDROCHLORIDE 0.25 MG: 1 INJECTION, SOLUTION INTRAMUSCULAR; INTRAVENOUS; SUBCUTANEOUS at 20:04

## 2024-06-26 RX ADMIN — SODIUM CHLORIDE, POTASSIUM CHLORIDE, SODIUM LACTATE AND CALCIUM CHLORIDE: 600; 310; 30; 20 INJECTION, SOLUTION INTRAVENOUS at 14:05

## 2024-06-26 RX ADMIN — TRANEXAMIC ACID 1000 MG: 100 INJECTION, SOLUTION INTRAVENOUS at 17:24

## 2024-06-26 RX ADMIN — HYDROMORPHONE HYDROCHLORIDE 0.5 MG: 2 INJECTION, SOLUTION INTRAMUSCULAR; INTRAVENOUS; SUBCUTANEOUS at 18:02

## 2024-06-26 RX ADMIN — ROCURONIUM BROMIDE 10 MG: 10 INJECTION, SOLUTION INTRAVENOUS at 18:17

## 2024-06-26 RX ADMIN — HYDROMORPHONE HYDROCHLORIDE 0.25 MG: 1 INJECTION, SOLUTION INTRAMUSCULAR; INTRAVENOUS; SUBCUTANEOUS at 19:58

## 2024-06-26 RX ADMIN — SODIUM CHLORIDE, POTASSIUM CHLORIDE, SODIUM LACTATE AND CALCIUM CHLORIDE: 600; 310; 30; 20 INJECTION, SOLUTION INTRAVENOUS at 18:36

## 2024-06-26 RX ADMIN — FENTANYL CITRATE 50 MCG: 50 INJECTION, SOLUTION INTRAMUSCULAR; INTRAVENOUS at 17:57

## 2024-06-26 RX ADMIN — FENTANYL CITRATE 50 MCG: 50 INJECTION, SOLUTION INTRAMUSCULAR; INTRAVENOUS at 17:49

## 2024-06-26 RX ADMIN — HYDROMORPHONE HYDROCHLORIDE 0.5 MG: 2 INJECTION, SOLUTION INTRAMUSCULAR; INTRAVENOUS; SUBCUTANEOUS at 18:12

## 2024-06-26 RX ADMIN — ONDANSETRON 4 MG: 2 INJECTION INTRAMUSCULAR; INTRAVENOUS at 16:57

## 2024-06-26 RX ADMIN — FENTANYL CITRATE 50 MCG: 50 INJECTION, SOLUTION INTRAMUSCULAR; INTRAVENOUS at 17:44

## 2024-06-26 RX ADMIN — Medication 120 MG: at 16:53

## 2024-06-26 RX ADMIN — ROCURONIUM BROMIDE 10 MG: 10 INJECTION, SOLUTION INTRAVENOUS at 16:53

## 2024-06-26 RX ADMIN — ACETAMINOPHEN 975 MG: 325 TABLET ORAL at 13:50

## 2024-06-26 RX ADMIN — SUGAMMADEX 200 MG: 100 INJECTION, SOLUTION INTRAVENOUS at 18:51

## 2024-06-26 RX ADMIN — ROCURONIUM BROMIDE 30 MG: 10 INJECTION, SOLUTION INTRAVENOUS at 16:57

## 2024-06-26 RX ADMIN — FENTANYL CITRATE 50 MCG: 50 INJECTION, SOLUTION INTRAMUSCULAR; INTRAVENOUS at 16:53

## 2024-06-26 RX ADMIN — LIDOCAINE HYDROCHLORIDE 50 MG: 20 INJECTION, SOLUTION INTRAVENOUS at 16:53

## 2024-06-26 RX ADMIN — HYDROMORPHONE HYDROCHLORIDE 0.5 MG: 2 INJECTION, SOLUTION INTRAMUSCULAR; INTRAVENOUS; SUBCUTANEOUS at 18:07

## 2024-06-26 RX ADMIN — DEXAMETHASONE SODIUM PHOSPHATE 4 MG: 4 INJECTION INTRA-ARTICULAR; INTRALESIONAL; INTRAMUSCULAR; INTRAVENOUS; SOFT TISSUE at 16:57

## 2024-06-26 RX ADMIN — HYDROMORPHONE HYDROCHLORIDE 0.5 MG: 2 INJECTION, SOLUTION INTRAMUSCULAR; INTRAVENOUS; SUBCUTANEOUS at 18:17

## 2024-06-26 ASSESSMENT — PAIN DESCRIPTION - LOCATION
LOCATION: KNEE

## 2024-06-26 ASSESSMENT — PAIN DESCRIPTION - ORIENTATION
ORIENTATION: LEFT

## 2024-06-26 ASSESSMENT — PAIN SCALES - GENERAL
PAINLEVEL_OUTOF10: 5
PAINLEVEL_OUTOF10: 4
PAINLEVEL_OUTOF10: 4
PAINLEVEL_OUTOF10: 0
PAINLEVEL_OUTOF10: 5
PAINLEVEL_OUTOF10: 4

## 2024-06-26 ASSESSMENT — PAIN DESCRIPTION - DESCRIPTORS
DESCRIPTORS: ACHING
DESCRIPTORS: DISCOMFORT
DESCRIPTORS: ACHING

## 2024-06-26 ASSESSMENT — COPD QUESTIONNAIRES: CAT_SEVERITY: MILD

## 2024-06-26 ASSESSMENT — PAIN DESCRIPTION - PAIN TYPE
TYPE: SURGICAL PAIN
TYPE: SURGICAL PAIN
TYPE: CHRONIC PAIN

## 2024-06-26 ASSESSMENT — PAIN DESCRIPTION - FREQUENCY
FREQUENCY: CONTINUOUS

## 2024-06-26 ASSESSMENT — PAIN DESCRIPTION - ONSET
ONSET: ON-GOING
ONSET: ON-GOING

## 2024-06-26 NOTE — DISCHARGE INSTRUCTIONS
PATIENT INSTRUCTIONS FOLLOWING OUTPATIENT   ARTHROSCOPIC KNEE SURGERY    1. Elevate the operated area above heart level and apply ice bag 20 minutes of every hour to operative extremity.  2. Dressing will be removed in the clinic / PT. DO NOT remove sutures, staples or Steri-strips.  3. Use crutches as needed.  4. Toe touch weight bearing with crutches for assistance to operative extremity.  5. Ankle pumps frequently  6. Follow up with previously scheduled appointment or call the office for an appointment if you do not already have one.  7. Call your doctor if:   Your incision becomes red, swollen or develops drainage  If the wound becomes increasingly painful uncontrolled with the pain medications.   If you develop fever greater than 101 degrees after the first 48 hours.  8. Please call with questions/concerns.      Board Certified Orthopaedic Surgeon  President and Medical Director  [facility] [employer] [facility] AMBULATORY PROCEDURE DISCHARGE INSTRUCTIONS    There are potential side effects of anesthesia or sedation you may experience for the first 24 hours.  These side effects include:    Confusion or Memory loss, Dizziness, or Delayed Reaction Times   [x]A responsible person should be with you for the next 24 hours.  Do not operate any vehicles (automobiles, bicycles, motorcycles) or power tools or machinery for 24 hours.  Do not sign any legal documents or make any legal decisions for 24 hours. Do not drink alcohol for 24 hours or while taking narcotic pain medication.      Nausea    [x]Start with light diet and progress to your normal diet as you feel like eating. However, if you experience nausea or repeated episodes of vomiting which persist beyond 12-24 hours, notify your physician.  Once nausea has passed, remember to keep drinking fluids.    Difficulty Passing Urine  [x]Drink extra amounts of fluid today.  Notify your physician if you have not  urinated within 8 hours after your procedure or you feel uncomfortable.      Irritated Throat from a Breathing Tube  [x]Drink extra amounts of fluid today.  Lozenges may help.    Muscle Aches  [x]You may experience some generalized body aches as your muscles recover from medications used to relax them during surgery.  These will gradually subside.    MEDICATION INSTRUCTIONS:  [x]Prescription(S) x  0   sent with you.  Use as directed.  When taking pain medications, you may experience the side effect of dizziness or drowsiness.  Do not drink alcohol or drive when taking these medications.  [x]Prescription(S) x    2      Called to Pharmacy Name and location:     [x]Give the list of your medications to your primary care physician on your next visit. Keep your med list updated and carry it with in case of emergencies.    [x] Narcotic pain medications can cause the side effect of significant constipation.  You may want to add a stool softener to your postoperative medication schedule or speak to your surgeon on how best to manage this side effect.    NARCOTIC SAFETY:  Your pain medicine is only for you to take.  Safely store your medicines.  Store pills up high and out of reach of children and pets.  Ensure safety caps are snapped tightly  Keep track of how many pills you have left    Unused medication can be disposed of by taking them to a drop-off box or take-back program that is authorized by the [employer].  Access to a site near you can be found on the [employer] website (deadiversion.Financeitoj.gov).    If you have a CPAP machine, it is very important that you use it daily during all periods of sleep and daytime rest during your recovery at home.  Surgery and Anesthesia place a significant amount of stress on your body.  Using your CPAP will help keep you safe and lessen the negative effects of that stress.    FOLLOW-UP RECOVERY CARE:  [x]Call the office at [phone] for follow-up appointment and

## 2024-06-26 NOTE — ANESTHESIA POSTPROCEDURE EVALUATION
Department of Anesthesiology  Postprocedure Note    Patient: Galindo Dickson  MRN: [id number]  YOB: 1955  Date of evaluation: 6/26/2024    Procedure Summary       Date: 06/26/24 Room / Location: [address]    Anesthesia Start: 1649 Anesthesia Stop: 1900    Procedure: LEFT KNEE ARTHROSCOPY WITH SYNOVECTOMY (Left: Knee) Diagnosis:       Primary osteoarthritis of left knee      (Primary osteoarthritis of left knee [M17.12])    Surgeons: Brian Sutherland MD Responsible Provider: Chaparro Buckner MD    Anesthesia Type: general ASA Status: 2            Anesthesia Type: No value filed.    Emeterio Phase I: Emeterio Score: 8    Emeterio Phase II:      Anesthesia Post Evaluation    Patient location during evaluation: PACU  Patient participation: complete - patient participated  Level of consciousness: awake and alert  Pain score: 0  Airway patency: patent  Nausea & Vomiting: no nausea and no vomiting  Cardiovascular status: hemodynamically stable  Respiratory status: acceptable  Hydration status: euvolemic  Pain management: adequate    No notable events documented.

## 2024-06-26 NOTE — PROGRESS NOTES
Patient arrived to PACU post LEFT KNEE ARTHROSCOPY WITH SYNOVECTOMY - Left with Dr. Sutherland. VSS on arrival. CRNA gave PACU RN report at bedside stating no complications during procedure. Dressing to surgical site clean, dry and intact. Patient shows no signs of pain at this time. Will continue to monitor.

## 2024-06-26 NOTE — ANESTHESIA PRE PROCEDURE
Department of Anesthesiology  Preprocedure Note       Name:  Galindo Dickson   Age:  68 y.o.  :  1955                                          MRN:  [id number]         Date:  2024      Surgeon: Surgeon(s):  Brian Sutherland MD    Procedure: Procedure(s):  LEFT KNEE ARTHROSCOPY WITH LYSIS OF ADHESIONS    Medications prior to admission:   Prior to Admission medications    Medication Sig Start Date End Date Taking? Authorizing Provider   oxyCODONE (ROXICODONE) 5 MG immediate release tablet Take 1-2 tablets by mouth every 4 hours as needed for Pain for up to 5 days. Intended supply: 5 days. Take lowest dose possible to manage pain Max Daily Amount: 60 mg 24  Shai Nuñez PA-C   pravastatin (PRAVACHOL) 40 MG tablet Take 1 tablet by mouth daily 24   Tamiko Conteh MD   aspirin 81 MG EC tablet Take 1 tablet by mouth in the morning and at bedtime  Patient taking differently: Take 1 tablet by mouth daily 24   Luis Mckinnon MD   senna (SENOKOT) 8.6 MG TABS tablet Take 1 tablet by mouth daily 24   Luis Mckinnon MD   Calcium-Magnesium-Vitamin D (CALCIUM 1200+D3 PO)  23   ProviderWilfredo MD   tamsulosin (FLOMAX) 0.4 MG capsule Take 1 capsule by mouth daily 24   Talon Campbell MD   NONFORMULARY SMOKES MARIJUANA    Provider, MD Wilfredo   Handicap Placard MISC by Does not apply route One year 24   Shai Nuñez PA-C   Flaxseed, Linseed, (FLAX SEED OIL) 1000 MG CAPS Take  by mouth.    ProviderWilfredo MD       Current medications:    Current Facility-Administered Medications   Medication Dose Route Frequency Provider Last Rate Last Admin   • lidocaine PF 1 % injection 1 mL  1 mL IntraDERmal Once PRN Orlin Mcgee DO       • lactated ringers IV soln infusion   IntraVENous Continuous Orlin Mcgee, DO       • sodium chloride flush 0.9 % injection 5-40 mL  5-40 mL IntraVENous 2 times per day Orlin Mcgee DO       • sodium chloride

## 2024-06-26 NOTE — H&P
H&P reviewed. No changes.           Shai Nuñez PA-C    Physician Assistant - Certified  Orthopedic Surgery   [facility]    06/26/24 1:37 PM

## 2024-06-27 ENCOUNTER — HOSPITAL ENCOUNTER (OUTPATIENT)
Dept: PHYSICAL THERAPY | Age: 69
Setting detail: THERAPIES SERIES
Discharge: HOME | End: 2024-06-27
Payer: MEDICARE

## 2024-06-27 DIAGNOSIS — Z96.652 S/P TOTAL KNEE ARTHROPLASTY, LEFT: Primary | ICD-10-CM

## 2024-06-27 DIAGNOSIS — M24.662 FIBROSIS OF LEFT KNEE JOINT: ICD-10-CM

## 2024-06-27 LAB
ACID FAST STN SPEC QL: NORMAL
LOEFFLER MB STN SPEC: NORMAL

## 2024-06-27 PROCEDURE — 97110 THERAPEUTIC EXERCISES: CPT | Performed by: PHYSICAL THERAPIST

## 2024-06-27 PROCEDURE — 97016 VASOPNEUMATIC DEVICE THERAPY: CPT | Performed by: PHYSICAL THERAPIST

## 2024-06-27 PROCEDURE — 97140 MANUAL THERAPY 1/> REGIONS: CPT | Performed by: PHYSICAL THERAPIST

## 2024-06-27 RX ORDER — CEPHALEXIN 500 MG/1
500 CAPSULE ORAL 2 TIMES DAILY
Qty: 28 CAPSULE | Refills: 0 | Status: SHIPPED | OUTPATIENT
Start: 2024-06-27 | End: 2024-07-11

## 2024-06-27 NOTE — PROGRESS NOTES
PACU Discharge Note    Current Allergies: Metronidazole, Naproxen, Atorvastatin, Codeine, Flagyl [metronidazole], and Lipitor    Pt meets criteria for discharge to home per Angel Score and ASPAN standards.    Discussed with patient and responsible individual receiving instructions how to measure pain per numerical scale and when to contact doctor if prescribed medications are not helping with post operative pain    Discharge instructions reviewed with patient and family.  Both verbalized understanding of instructions. Gave patient and family opportunity to ask questions.  All questions reviewed and answered. Documents signed and copy of discharge instructions given.       Vitals:    06/26/24 2015   BP: 113/68   Pulse: 71   Resp: 17   Temp: 97.8 °F (36.6 °C)   SpO2: 96%      BP within 20% of pt's admitting BP per ANGEL SCORE    In: 1100 [I.V.:1100]  Out: 410       Pain assessment:  present - adequately treated  Pain Level: 4 (tolerable)    Offered patient opportunity to use restroom prior to discharge. Patient voided    Patient has all belongings at discharge from PACU.  PACU RN called and updated patient's family.     Patient discharged to home/self care via wheel chair by transporter/RN with a responsible individual.      6/26/2024 8:34 PM

## 2024-06-27 NOTE — OP NOTE
[address] [address]                            OPERATIVE REPORT      PATIENT NAME: IRVING GONZALEZ              : 1955  MED REC NO: [id number]                      ROOM: OR  ACCOUNT NO: [id number]                       ADMIT DATE: 2024  PROVIDER: Brian Sutherland MD      DATE OF PROCEDURE:  2024    SURGEON:  Brian Sutherland MD    PREOPERATIVE DIAGNOSIS:  Arthrofibrosis, left knee, status post total knee replacement.    POSTOPERATIVE DIAGNOSIS:  Arthrofibrosis, left knee, status post total knee replacement.    SURGICAL PROCEDURE:  Arthroscopic anterior synovectomy, extensive scar tissue, suprapatellar, infrapatellar, medial and lateral knee regions.    FIRST ASSISTANT:  Erick Bridges.    ANESTHESIA:  General.    OPERATIVE INDICATIONS:  This patient has been struggling with gaining knee motion.  He had an uneventful total knee replacement except he did have partial repair of the medial collateral ligament as it did need a release due to severe varus alignment and contracture.  At the third week, he was in the range of 8-60 degrees and a very gentle manipulation was performed, getting up to approximately 90 degrees and restoring patella mobility.  However, 3 weeks following that, he promptly went back to the same range of motion without a benefit despite adequate and good therapy.  Decision was made at this time that he did require very early arthroscopic releases of scar tissue and removal of the extensive scar tissue that had formed.  I did advise him that there is a slight risk for infection as the etiology of the scar tissue production, even though his knee has only been slightly warm and does not fulfill the criteria at the present time of a postoperative infectious knee replacement.    OPERATIVE FINDINGS:  I did perform a the extensive anterior synovectomy relieving scar tissue in the suprapatellar region, anterior

## 2024-06-27 NOTE — PLAN OF CARE
(Mobilization/manipulation, manual lymphatic drainage, manual traction) for the purpose of modulating pain, promoting relaxation,  increasing ROM, reducing/eliminating soft tissue swelling/inflammation/restriction, improving soft tissue extensibility and allowing for proper ROM for normal function with self care, mobility, lifting and ambulation  (39882) VASOPNEUMATIC  (99629) ADL - Provided self-care/home management training related to activities of daily living and compensatory training, and/or use of adaptive equipment for improvement with: ADLs and compensatory training, meal preparation, safety procedures and instruction in use of adaptive equipment, including bathing, grooming, dressing, personal hygiene, basic household cleaning and chores.       GOALS     GOALS: 6/27/24  Patient stated goal: Return to PLOF without restriction.  [x] Progressing: [] Met: [x] Not Met: [] Adjusted    Therapist goals for Patient:   Short Term Goals: To be achieved in: 1-2 visit(s)  1. Independent in HEP and progression per patient tolerance, in order to prevent re-injury.   [x] Progressing: [] Met: [x] Not Met: [] Adjusted      Long Term Goals:   Long Term Goals: To be achieved in: 8 weeks  Disability index score of 50% or less for the WOMAC to assist with return top prior level of function.    Status: [x] Progressing: [] Met: [x] Not Met: [] Adjusted  Improve  knee PROM  Flexion to 115 degrees or  better to allow for proper joint functioning as indicated by patients functional deficits.  Status: [x] Progressing: [] Met: [x] Not Met: [] Adjusted  Pt to improve strength to show motor control/activation of quadriceps to facilitate functional mobility and ADLs.   Status: [x] Progressing: [] Met: [x] Not Met: [] Adjusted  Patient will return to walking around house without increased symptoms or restriction to work towards return to prior level of function.  Status: [x] Progressing: [] Met: [x] Not Met: [] Adjusted  Patient will

## 2024-06-27 NOTE — OR NURSING
6/26/2024   1700: synovasure kit taken from coordinator's office  1740: kit was opened by Alissa Brennan RN  1745: specimen was withdrawn from patient by Dr. Sutherland and handed to [facility]  1750: kit instruction read by Thai Hamm RN and filled out the forms attached  1755: specimen was passed to Alissa Brennan RN and transferred it to the tubes, labeled and packed together with Thai Hamm RN  1810: specimen, forms and package had verified with Shai Nuñez PA-C  1815: specimen/ kit was taken out of room by Thai Hamm RN and to laboratory by Alissa Brennan RN

## 2024-06-28 ENCOUNTER — HOSPITAL ENCOUNTER (OUTPATIENT)
Dept: PHYSICAL THERAPY | Age: 69
Setting detail: THERAPIES SERIES
Discharge: HOME | End: 2024-06-28
Payer: MEDICARE

## 2024-06-28 DIAGNOSIS — M24.661 ARTHROFIBROSIS OF KNEE JOINT, RIGHT: Primary | ICD-10-CM

## 2024-06-28 PROCEDURE — 97140 MANUAL THERAPY 1/> REGIONS: CPT | Performed by: PHYSICAL THERAPIST

## 2024-06-28 PROCEDURE — 97016 VASOPNEUMATIC DEVICE THERAPY: CPT | Performed by: PHYSICAL THERAPIST

## 2024-06-28 PROCEDURE — 97110 THERAPEUTIC EXERCISES: CPT | Performed by: PHYSICAL THERAPIST

## 2024-06-28 NOTE — PLAN OF CARE
[facility]- Outpatient Rehabilitation and Therapy [address], [address], [place] [address] office: [phone] fax: [phone]      Physical Therapy Re-Certification Plan of Care    Dear Brian Sutherland MD  ,    We had the pleasure of treating the following patient for physical therapy services at [facility]. A summary of our findings can be found in the updated assessment below.  This includes our plan of care.  If you have any questions or concerns regarding these findings, please do not hesitate to contact me at the office phone number checked above.  Thank you for the referral.     Physician Signature:________________________________Date:__________________  By signing above (or electronic signature), therapist's plan is approved by physician        Overall Response to Treatment:  1 day s/p left knee scope/MIRELA/RICO.  Recommend PT  2-3x/week for 6 weeks     Total Visits: 8     Recommendation:    [x] Continue PT 2-3x / wk for 6 weeks.   [] Hold PT, pending MD visit   [] Discharge to [facility]. Follow up with PT or MD PRN.       Physical Therapy: TREATMENT/PROGRESS NOTE   Patient: Galindo Dickson (68 y.o. male)   Examination Date: 2024   :  1955 MRN: [id number]   Visit #: 8   Insurance Allowable Auth Needed   [place] []Yes    [x]No    Insurance: Payor: MEDICARE / Plan: MEDICARE PART A AND B / Product Type: *No Product type* /   Insurance ID: [id number] - (Medicare)  Secondary Insurance (if applicable): [facility]   Treatment Diagnosis:     ICD-10-CM    1. Left knee pain, unspecified chronicity  M25.562       2. Weakness  R53.1       3. Gait abnormality  R26.9          Medical Diagnosis:  Left knee pain [M25.562]  S/P L TKR & MCL repair 24  S/P RICO + qpump 24  S/P MIRELA + RICO 24   Referring Physician: Brian Sutherland MD  PCP: Tamiko Conteh MD       Plan of care signed (Y/N): Y    Date of Patient follow up with Physician: Patient was seen

## 2024-06-29 LAB
BACTERIA SNV CULT: NORMAL
BACTERIA SPEC ANAEROBE CULT: NORMAL

## 2024-07-01 ENCOUNTER — APPOINTMENT (OUTPATIENT)
Dept: PHYSICAL THERAPY | Age: 69
End: 2024-07-01
Payer: COMMERCIAL

## 2024-07-01 ENCOUNTER — HOSPITAL ENCOUNTER (OUTPATIENT)
Dept: PHYSICAL THERAPY | Age: 69
Setting detail: THERAPIES SERIES
Discharge: HOME | End: 2024-07-01
Payer: COMMERCIAL

## 2024-07-01 DIAGNOSIS — Z98.890 S/P LEFT KNEE ARTHROSCOPY: ICD-10-CM

## 2024-07-01 LAB
BACTERIA SNV CULT: NORMAL
BACTERIA SPEC ANAEROBE CULT: NORMAL

## 2024-07-01 PROCEDURE — 97016 VASOPNEUMATIC DEVICE THERAPY: CPT | Performed by: PHYSICAL THERAPIST

## 2024-07-01 PROCEDURE — 97140 MANUAL THERAPY 1/> REGIONS: CPT | Performed by: PHYSICAL THERAPIST

## 2024-07-01 PROCEDURE — 97110 THERAPEUTIC EXERCISES: CPT | Performed by: PHYSICAL THERAPIST

## 2024-07-01 RX ORDER — HYDROMORPHONE HYDROCHLORIDE 2 MG/1
2 TABLET ORAL EVERY 6 HOURS PRN
Qty: 20 TABLET | Refills: 0 | Status: SHIPPED | OUTPATIENT
Start: 2024-07-01 | End: 2024-07-06 | Stop reason: SDUPTHER

## 2024-07-01 NOTE — FLOWSHEET NOTE
(07839)     Aquatic Therex (45784)    Dry Needle 3+ muscle (48346)     Iontophoresis (24814)    VASO (92637) 1    Ultrasound (02173)    Group Therapy (61608)     Estim Attended (31040)    Canalith Repositioning (80374)     Other:    Other:    Total Timed Code Tx Minutes 46' 3  1     Total Treatment Minutes 8:08 - 9:25  77'       Charge Justification:  (98848) THERAPEUTIC EXERCISE - Provided verbal/tactile cueing for activities related to strengthening, flexibility, endurance, ROM performed to prevent loss of range of motion, maintain or improve muscular strength or increase flexibility, following either an injury or surgery.   (29641) HOME EXERCISE PROGRAM - Reviewed/Progressed HEP activities related to strengthening, flexibility, endurance, ROM performed to prevent loss of range of motion, maintain or improve muscular strength or increase flexibility, following either an injury or surgery.  (17682) MANUAL THERAPY -  Manual therapy techniques, 1 or more regions, each 15 minutes (Mobilization/manipulation, manual lymphatic drainage, manual traction) for the purpose of modulating pain, promoting relaxation,  increasing ROM, reducing/eliminating soft tissue swelling/inflammation/restriction, improving soft tissue extensibility and allowing for proper ROM for normal function with self care, mobility, lifting and ambulation  (11645) VASOPNEUMATIC      GOALS     GOALS: 6/27/24  Patient stated goal: Return to PLOF without restriction.  [x] Progressing: [] Met: [x] Not Met: [] Adjusted    Therapist goals for Patient:   Short Term Goals: To be achieved in: 1-2 visit(s)  1. Independent in HEP and progression per patient tolerance, in order to prevent re-injury.   [x] Progressing: [] Met: [x] Not Met: [] Adjusted      Long Term Goals:   Long Term Goals: To be achieved in: 8 weeks  Disability index score of 50% or less for the WOMAC to assist with return top prior level of function.    Status: [x] Progressing: [] Met: [x] Not

## 2024-07-02 ENCOUNTER — OFFICE VISIT (OUTPATIENT)
Dept: ORTHOPEDIC SURGERY | Age: 69
End: 2024-07-02

## 2024-07-02 VITALS — HEIGHT: 69 IN | WEIGHT: 173 LBS | BODY MASS INDEX: 25.62 KG/M2

## 2024-07-02 DIAGNOSIS — M24.662 FIBROSIS OF LEFT KNEE JOINT: ICD-10-CM

## 2024-07-02 DIAGNOSIS — Z96.652 S/P TOTAL KNEE ARTHROPLASTY, LEFT: ICD-10-CM

## 2024-07-02 DIAGNOSIS — Z09 POSTOP CHECK: Primary | ICD-10-CM

## 2024-07-02 PROCEDURE — 99024 POSTOP FOLLOW-UP VISIT: CPT | Performed by: PHYSICIAN ASSISTANT

## 2024-07-02 RX ORDER — PREDNISONE 10 MG/1
10 TABLET ORAL DAILY
Qty: 14 TABLET | Refills: 0 | Status: SHIPPED | OUTPATIENT
Start: 2024-07-02 | End: 2024-07-12 | Stop reason: SDUPTHER

## 2024-07-05 ENCOUNTER — HOSPITAL ENCOUNTER (OUTPATIENT)
Dept: PHYSICAL THERAPY | Age: 69
Setting detail: THERAPIES SERIES
Discharge: HOME | End: 2024-07-05
Payer: COMMERCIAL

## 2024-07-05 PROCEDURE — 97140 MANUAL THERAPY 1/> REGIONS: CPT | Performed by: PHYSICAL THERAPIST

## 2024-07-05 PROCEDURE — 97016 VASOPNEUMATIC DEVICE THERAPY: CPT | Performed by: PHYSICAL THERAPIST

## 2024-07-05 PROCEDURE — 97110 THERAPEUTIC EXERCISES: CPT | Performed by: PHYSICAL THERAPIST

## 2024-07-05 NOTE — FLOWSHEET NOTE
Act (29589)    [facility]. Traction (66292)     Gait (30089)    Dry Needle 1-2 muscle (40668)     Aquatic Therex (79606)    Dry Needle 3+ muscle (09355)     Iontophoresis (26732)    VASO (59176) 1    Ultrasound (22490)    Group Therapy (64636)     Estim Attended (61587)    Canalith Repositioning (12396)     Other:    Other:    Total Timed Code Tx Minutes 47' 3  1     Total Treatment Minutes 8:40-10:00  80'       Charge Justification:  (25943) THERAPEUTIC EXERCISE - Provided verbal/tactile cueing for activities related to strengthening, flexibility, endurance, ROM performed to prevent loss of range of motion, maintain or improve muscular strength or increase flexibility, following either an injury or surgery.   (73440) HOME EXERCISE PROGRAM - Reviewed/Progressed HEP activities related to strengthening, flexibility, endurance, ROM performed to prevent loss of range of motion, maintain or improve muscular strength or increase flexibility, following either an injury or surgery.  (58311) MANUAL THERAPY -  Manual therapy techniques, 1 or more regions, each 15 minutes (Mobilization/manipulation, manual lymphatic drainage, manual traction) for the purpose of modulating pain, promoting relaxation,  increasing ROM, reducing/eliminating soft tissue swelling/inflammation/restriction, improving soft tissue extensibility and allowing for proper ROM for normal function with self care, mobility, lifting and ambulation  (98146) VASOPNEUMATIC      GOALS     GOALS: 6/27/24  Patient stated goal: Return to PLOF without restriction.  [x] Progressing: [] Met: [x] Not Met: [] Adjusted    Therapist goals for Patient:   Short Term Goals: To be achieved in: 1-2 visit(s)  1. Independent in HEP and progression per patient tolerance, in order to prevent re-injury.   [x] Progressing: [] Met: [x] Not Met: [] Adjusted      Long Term Goals:   Long Term Goals: To be achieved in: 8 weeks  Disability index score of 50% or less for the WOMAC to assist with

## 2024-07-06 DIAGNOSIS — Z98.890 S/P LEFT KNEE ARTHROSCOPY: ICD-10-CM

## 2024-07-06 RX ORDER — HYDROMORPHONE HYDROCHLORIDE 2 MG/1
2 TABLET ORAL EVERY 6 HOURS PRN
Qty: 20 TABLET | Refills: 0 | Status: SHIPPED | OUTPATIENT
Start: 2024-07-06 | End: 2024-07-11

## 2024-07-08 ENCOUNTER — HOSPITAL ENCOUNTER (OUTPATIENT)
Dept: PHYSICAL THERAPY | Age: 69
Setting detail: THERAPIES SERIES
Discharge: HOME | End: 2024-07-08
Payer: MEDICARE

## 2024-07-08 LAB
BACTERIA SNV CULT: NORMAL
BACTERIA SPEC ANAEROBE CULT: NORMAL
FUNGUS SPEC CULT: NORMAL
LOEFFLER MB STN SPEC: NORMAL

## 2024-07-08 PROCEDURE — 97110 THERAPEUTIC EXERCISES: CPT | Performed by: PHYSICAL THERAPIST

## 2024-07-08 PROCEDURE — 97016 VASOPNEUMATIC DEVICE THERAPY: CPT | Performed by: PHYSICAL THERAPIST

## 2024-07-08 PROCEDURE — 97140 MANUAL THERAPY 1/> REGIONS: CPT | Performed by: PHYSICAL THERAPIST

## 2024-07-08 NOTE — FLOWSHEET NOTE
weekly - 1 sets - 10 reps - 10 sec hold    ASSESSMENT     Today's Assessment:  12 days s/p left knee MIRELA/RICO. Edema is improving since last visit and remains well controlled. PFJ mobility was mildly limited today, primarily superior/inferior, but improved with mobilizations and stretching.  Extension ROM is showing mild improvements, reaching -5 cold. Flexion ROM remains limited, only reaching 85 at the end of the session.      During therapy this date, patient required verbal cueing, muscle facilitation, and manual interventions for modulating pain, increasing ROM, and reduce/eliminate soft tissue swelling/inflammation/restriction.Patient will continue to benefit from ongoing evaluation and advanced clinical decision from a Physical Therapist to address and improve pain control, ROM, muscle strength, neuromuscular control, and ADL status to safely return to PLOF without symptoms or restrictions. and Patient had fair  tolerance to today's session, reporting improved ROM, increased pain, and challenge with program completed. Able to progress  intensity on stretching. Continues to display deficits in stiffness, weakness, decreased NM control, and decreased balance control which required ongoing skilled physical therapy and decision making.       Medical Necessity Documentation:  I certify that this patient meets the below criteria necessary for medical necessity for care and/or justification of therapy services:  The patient has functional impairments and/or activity limitations and would benefit from continued outpatient therapy services to address the deficits outlined in the patients goals  The patient has a musculoskeletal condition(s) with a corresponding ICD-10 code that is of complexity and severity that require skilled therapeutic intervention. This has a direct and significant impact on the need for therapy and significantly impacts the rate of recovery.       Return to Play: NA    Prognosis for POC: [x]

## 2024-07-08 NOTE — PROGRESS NOTES
condition.     All the patient's questions were answered while in the clinic.  The patient is understanding of all instructions and agrees with the plan.      Treatment Plan:    Postop instructions  Total joint protocol  Arthrofibrosis protocol  Physical therapy/home exercise program  Ambulate with an assistive device  Weightbearing as tolerated  Appropriate utilization of narcotics  Activity modification/Rest   Ice 20 minutes ever 1-2 hours PRN  Take medications as prescribed/instructed  Elevation   Lightweight exercise/low impact exercise  Appropriate diet/weight management      Follow up: 1 week and as needed        Shai Nuñez PA-C    Physician Assistant - Certified  Orthopedic Surgery   [facility]    07/08/24 3:51 PM      This dictation was performed with a verbal recognition program (DRAGON) and it was checked for errors.  It is possible that there are still dictated errors within this office note.  If so, please bring any errors to my attention for an addendum.  All efforts were made to ensure that this office note is accurate.

## 2024-07-09 LAB
ACID FAST STN SPEC QL: NORMAL
MYCOBACTERIUM SPEC CULT: NORMAL

## 2024-07-11 ENCOUNTER — ANESTHESIA EVENT (OUTPATIENT)
Dept: POSTOP/PACU | Age: 69
End: 2024-07-11
Payer: MEDICARE

## 2024-07-11 ENCOUNTER — TELEPHONE (OUTPATIENT)
Dept: ORTHOPEDIC SURGERY | Age: 69
End: 2024-07-11

## 2024-07-11 ENCOUNTER — HOSPITAL ENCOUNTER (OUTPATIENT)
Dept: PREADMISSION TESTING | Age: 69
End: 2024-07-11
Payer: MEDICARE

## 2024-07-11 ENCOUNTER — HOSPITAL ENCOUNTER (OUTPATIENT)
Dept: PHYSICAL THERAPY | Age: 69
Setting detail: THERAPIES SERIES
Discharge: HOME | End: 2024-07-11
Payer: MEDICARE

## 2024-07-11 VITALS — HEIGHT: 69 IN | BODY MASS INDEX: 25.62 KG/M2 | WEIGHT: 173 LBS

## 2024-07-11 DIAGNOSIS — Z98.890 S/P LEFT KNEE ARTHROSCOPY: ICD-10-CM

## 2024-07-11 PROCEDURE — 97140 MANUAL THERAPY 1/> REGIONS: CPT | Performed by: PHYSICAL THERAPIST

## 2024-07-11 PROCEDURE — 97016 VASOPNEUMATIC DEVICE THERAPY: CPT | Performed by: PHYSICAL THERAPIST

## 2024-07-11 PROCEDURE — 97110 THERAPEUTIC EXERCISES: CPT | Performed by: PHYSICAL THERAPIST

## 2024-07-11 RX ORDER — HYDROMORPHONE HYDROCHLORIDE 2 MG/1
2 TABLET ORAL EVERY 6 HOURS PRN
Qty: 20 TABLET | Refills: 0 | Status: SHIPPED | OUTPATIENT
Start: 2024-07-11 | End: 2024-07-16

## 2024-07-11 NOTE — FLOWSHEET NOTE
increasing ROM, reducing/eliminating soft tissue swelling/inflammation/restriction, improving soft tissue extensibility and allowing for proper ROM for normal function with self care, mobility, lifting and ambulation  (42533) VASOPNEUMATIC      GOALS     GOALS: 6/27/24  Patient stated goal: Return to PLOF without restriction.  [x] Progressing: [] Met: [x] Not Met: [] Adjusted    Therapist goals for Patient:   Short Term Goals: To be achieved in: 1-2 visit(s)  1. Independent in HEP and progression per patient tolerance, in order to prevent re-injury.   [x] Progressing: [] Met: [x] Not Met: [] Adjusted      Long Term Goals:   Long Term Goals: To be achieved in: 8 weeks  Disability index score of 50% or less for the WOMAC to assist with return top prior level of function.    Status: [x] Progressing: [] Met: [x] Not Met: [] Adjusted  Improve  knee PROM  Flexion to 115 degrees or  better to allow for proper joint functioning as indicated by patients functional deficits.  Status: [x] Progressing: [] Met: [x] Not Met: [] Adjusted  Pt to improve strength to show motor control/activation of quadriceps to facilitate functional mobility and ADLs.   Status: [x] Progressing: [] Met: [x] Not Met: [] Adjusted  Patient will return to walking around house without increased symptoms or restriction to work towards return to prior level of function.  Status: [x] Progressing: [] Met: [x] Not Met: [] Adjusted  Patient will increase LE function to allow independence in all self-care activities.             Status: [x] Progressing: [] Met: [x] Not Met: [] Adjusted      TREATMENT PLAN     Frequency/Duration: 2-3x/week for 8 weeks for the following treatment interventions:    Interventions:  Therapeutic Exercise (22280) including: strength training, ROM, and functional mobility  Therapeutic Activities (59783) including: functional mobility training and education.  Neuromuscular Re-education (55304) activation and proprioception, including

## 2024-07-11 NOTE — PROGRESS NOTES
7/11/2024 12:32pm JAVIER Gibbons will do H&P day of procedure and has been informed of the need for orders/consents via teams; he states he will placed orders/consents today. -db

## 2024-07-11 NOTE — TELEPHONE ENCOUNTER
Patient underwent total knee replacement with Dr. Sutherland on 5/8/2024. Patient began struggling with motion and showed signs of arthrofibrosis post operatively. He underwent a scope procedure to remove scar tissue on 6/26/24. He was making progress with motion and was able to to achieve 80 degrees of flexion. At this point, we feel it is medically necessary for him to undergo a joint manipulation as he continues to show signs of arthrofibrosis in the knee joint. We feel if he does not undergo a manipulation, he will begin regressing in his progress. 80 degrees of knee motion is not an acceptable level of function after total knee replacement and a manipulation is necessary to help this patient achieve a more functional level of knee motion.    I, Samantha Duque ATC, am scribing for Dr. Brian Sutherland MD on this date of 07/11/24 at 9:24 AM

## 2024-07-11 NOTE — PROGRESS NOTES
PRE-OP INSTRUCTIONS FOR PROCEDURE WITH ANESTHESIA PATIENT    LEFT  KNEE MANIPULATION     Arrive at 530AM for your procedure on 7/12/2024 at 715AM   Arrange for someone to drive you home and be with you for the first 24 hours after discharge.     We allow ONLY 2 ADULTS to accompany you  One person MUST stay at hospital entire time for this outpatient test    Enter the MAIN entrance located on [address] and report to the surgical desk on the LEFT side of the lobby. Please park in the parking garage or there is free  Parking available after 7am for your use.    Bring your insurance card & photo ID with you to register.  MEDICATIONS:  Take MAY TAKE HYDROMORPHONE  am of procedure with small sip of water.  Per anesthesia, do not take OTHER MEDICATIONS UNTIL AFTER PROCEDURE.  A Pre-Surgical History and Physical MUST be completed WITHIN 30 DAYS OR LESS prior to your procedure by your Physician or an Urgent Care.  DO NOT EAT ANYTHING 8 hours prior to arrival for your procedure.  You may have sips of WATER ONLY (up to 8 ounces) 4 hours prior to your arrival for procedure. Then nothing further 4 hours prior to arriving at hospital.   No gum, candy, mints, or ice chips day of procedure.   Please refrain from drinking alcohol the day before or day of your procedure.   Do not use any recreational marijuana at least 24 hours or street drugs (heroin, cocaine) at minimum 5 days prior to your procedure.   Please do not smoke the day of your procedure.    Dress in loose, comfortable clothing appropriate for redressing after your procedure.   Do not wear jewelry (including body piercings), make-up, fingernail polish, lotion, powders, or metal hairclips.   Contacts, glasses, dentures, and hearing aids will need to be removed prior to procedure .  Bring your case(s) to protect them while you are in your procedure.    If you use a CPAP, please bring it with you on the day of your procedure.  If you use oxygen at home, please

## 2024-07-12 ENCOUNTER — HOSPITAL ENCOUNTER (OUTPATIENT)
Dept: POSTOP/PACU | Age: 69
Discharge: HOME | End: 2024-07-12
Payer: MEDICARE

## 2024-07-12 ENCOUNTER — APPOINTMENT (OUTPATIENT)
Dept: PHYSICAL THERAPY | Age: 69
End: 2024-07-12
Payer: COMMERCIAL

## 2024-07-12 ENCOUNTER — ANESTHESIA (OUTPATIENT)
Dept: POSTOP/PACU | Age: 69
End: 2024-07-12
Payer: MEDICARE

## 2024-07-12 VITALS
OXYGEN SATURATION: 100 % | SYSTOLIC BLOOD PRESSURE: 120 MMHG | HEIGHT: 69 IN | DIASTOLIC BLOOD PRESSURE: 80 MMHG | TEMPERATURE: 97 F | BODY MASS INDEX: 24.87 KG/M2 | HEART RATE: 61 BPM | RESPIRATION RATE: 15 BRPM | WEIGHT: 167.9 LBS

## 2024-07-12 DIAGNOSIS — Z96.652 S/P TOTAL KNEE ARTHROPLASTY, LEFT: ICD-10-CM

## 2024-07-12 PROCEDURE — 6360000002 HC RX W HCPCS

## 2024-07-12 PROCEDURE — 3700000001 HC ADD 15 MINUTES (ANESTHESIA): Performed by: ANESTHESIOLOGY

## 2024-07-12 PROCEDURE — 2580000003 HC RX 258: Performed by: ANESTHESIOLOGY

## 2024-07-12 PROCEDURE — 7100000001 HC PACU RECOVERY - ADDTL 15 MIN: Performed by: ANESTHESIOLOGY

## 2024-07-12 PROCEDURE — 6370000000 HC RX 637 (ALT 250 FOR IP): Performed by: PHYSICIAN ASSISTANT

## 2024-07-12 PROCEDURE — 7100000000 HC PACU RECOVERY - FIRST 15 MIN: Performed by: ANESTHESIOLOGY

## 2024-07-12 PROCEDURE — 2580000003 HC RX 258

## 2024-07-12 PROCEDURE — 3700000000 HC ANESTHESIA ATTENDED CARE: Performed by: ANESTHESIOLOGY

## 2024-07-12 PROCEDURE — 7100000011 HC PHASE II RECOVERY - ADDTL 15 MIN: Performed by: ANESTHESIOLOGY

## 2024-07-12 PROCEDURE — 7100000010 HC PHASE II RECOVERY - FIRST 15 MIN: Performed by: ANESTHESIOLOGY

## 2024-07-12 PROCEDURE — 2500000003 HC RX 250 WO HCPCS

## 2024-07-12 PROCEDURE — 3600000500 HC JOINT MANIPULATION: Performed by: ANESTHESIOLOGY

## 2024-07-12 RX ORDER — PROCHLORPERAZINE EDISYLATE 5 MG/ML
5 INJECTION INTRAMUSCULAR; INTRAVENOUS
Status: DISCONTINUED | OUTPATIENT
Start: 2024-07-12 | End: 2024-07-13 | Stop reason: HOSPADM

## 2024-07-12 RX ORDER — OXYCODONE HYDROCHLORIDE 5 MG/1
5 TABLET ORAL
Status: DISCONTINUED | OUTPATIENT
Start: 2024-07-12 | End: 2024-07-13 | Stop reason: HOSPADM

## 2024-07-12 RX ORDER — LABETALOL HYDROCHLORIDE 5 MG/ML
10 INJECTION, SOLUTION INTRAVENOUS
Status: DISCONTINUED | OUTPATIENT
Start: 2024-07-12 | End: 2024-07-13 | Stop reason: HOSPADM

## 2024-07-12 RX ORDER — HYDROMORPHONE HYDROCHLORIDE 2 MG/1
2 TABLET ORAL
Status: COMPLETED | OUTPATIENT
Start: 2024-07-12 | End: 2024-07-12

## 2024-07-12 RX ORDER — HYDRALAZINE HYDROCHLORIDE 20 MG/ML
10 INJECTION INTRAMUSCULAR; INTRAVENOUS
Status: DISCONTINUED | OUTPATIENT
Start: 2024-07-12 | End: 2024-07-13 | Stop reason: HOSPADM

## 2024-07-12 RX ORDER — ONDANSETRON 2 MG/ML
4 INJECTION INTRAMUSCULAR; INTRAVENOUS
Status: DISCONTINUED | OUTPATIENT
Start: 2024-07-12 | End: 2024-07-13 | Stop reason: HOSPADM

## 2024-07-12 RX ORDER — SODIUM CHLORIDE 0.9 % (FLUSH) 0.9 %
5-40 SYRINGE (ML) INJECTION EVERY 12 HOURS SCHEDULED
Status: DISCONTINUED | OUTPATIENT
Start: 2024-07-12 | End: 2024-07-13 | Stop reason: HOSPADM

## 2024-07-12 RX ORDER — LIDOCAINE HYDROCHLORIDE 20 MG/ML
INJECTION, SOLUTION EPIDURAL; INFILTRATION; INTRACAUDAL; PERINEURAL PRN
Status: DISCONTINUED | OUTPATIENT
Start: 2024-07-12 | End: 2024-07-12 | Stop reason: SDUPTHER

## 2024-07-12 RX ORDER — PROPOFOL 10 MG/ML
INJECTION, EMULSION INTRAVENOUS PRN
Status: DISCONTINUED | OUTPATIENT
Start: 2024-07-12 | End: 2024-07-12 | Stop reason: SDUPTHER

## 2024-07-12 RX ORDER — SODIUM CHLORIDE 9 MG/ML
INJECTION, SOLUTION INTRAVENOUS PRN
Status: DISCONTINUED | OUTPATIENT
Start: 2024-07-12 | End: 2024-07-13 | Stop reason: HOSPADM

## 2024-07-12 RX ORDER — FENTANYL CITRATE 50 UG/ML
INJECTION, SOLUTION INTRAMUSCULAR; INTRAVENOUS PRN
Status: DISCONTINUED | OUTPATIENT
Start: 2024-07-12 | End: 2024-07-12 | Stop reason: SDUPTHER

## 2024-07-12 RX ORDER — SODIUM CHLORIDE 0.9 % (FLUSH) 0.9 %
5-40 SYRINGE (ML) INJECTION PRN
Status: DISCONTINUED | OUTPATIENT
Start: 2024-07-12 | End: 2024-07-13 | Stop reason: HOSPADM

## 2024-07-12 RX ORDER — MEPERIDINE HYDROCHLORIDE 25 MG/ML
12.5 INJECTION INTRAMUSCULAR; INTRAVENOUS; SUBCUTANEOUS EVERY 5 MIN PRN
Status: DISCONTINUED | OUTPATIENT
Start: 2024-07-12 | End: 2024-07-13 | Stop reason: HOSPADM

## 2024-07-12 RX ORDER — SODIUM CHLORIDE, SODIUM LACTATE, POTASSIUM CHLORIDE, CALCIUM CHLORIDE 600; 310; 30; 20 MG/100ML; MG/100ML; MG/100ML; MG/100ML
INJECTION, SOLUTION INTRAVENOUS CONTINUOUS PRN
Status: DISCONTINUED | OUTPATIENT
Start: 2024-07-12 | End: 2024-07-12 | Stop reason: SDUPTHER

## 2024-07-12 RX ORDER — PREDNISONE 10 MG/1
TABLET ORAL
Qty: 21 TABLET | Refills: 0 | Status: SHIPPED | OUTPATIENT
Start: 2024-07-12 | End: 2024-07-26

## 2024-07-12 RX ORDER — HYDROMORPHONE HYDROCHLORIDE 1 MG/ML
0.25 INJECTION, SOLUTION INTRAMUSCULAR; INTRAVENOUS; SUBCUTANEOUS
Status: DISCONTINUED | OUTPATIENT
Start: 2024-07-12 | End: 2024-07-13 | Stop reason: HOSPADM

## 2024-07-12 RX ORDER — SODIUM CHLORIDE, SODIUM LACTATE, POTASSIUM CHLORIDE, CALCIUM CHLORIDE 600; 310; 30; 20 MG/100ML; MG/100ML; MG/100ML; MG/100ML
INJECTION, SOLUTION INTRAVENOUS ONCE
Status: COMPLETED | OUTPATIENT
Start: 2024-07-12 | End: 2024-07-12

## 2024-07-12 RX ORDER — HYDROMORPHONE HYDROCHLORIDE 1 MG/ML
0.5 INJECTION, SOLUTION INTRAMUSCULAR; INTRAVENOUS; SUBCUTANEOUS EVERY 5 MIN PRN
Status: DISCONTINUED | OUTPATIENT
Start: 2024-07-12 | End: 2024-07-13 | Stop reason: HOSPADM

## 2024-07-12 RX ORDER — NALOXONE HYDROCHLORIDE 0.4 MG/ML
INJECTION, SOLUTION INTRAMUSCULAR; INTRAVENOUS; SUBCUTANEOUS PRN
Status: DISCONTINUED | OUTPATIENT
Start: 2024-07-12 | End: 2024-07-13 | Stop reason: HOSPADM

## 2024-07-12 RX ORDER — HYDROMORPHONE HYDROCHLORIDE 1 MG/ML
0.5 INJECTION, SOLUTION INTRAMUSCULAR; INTRAVENOUS; SUBCUTANEOUS
Status: DISCONTINUED | OUTPATIENT
Start: 2024-07-12 | End: 2024-07-13 | Stop reason: HOSPADM

## 2024-07-12 RX ADMIN — PROPOFOL 150 MG: 10 INJECTION, EMULSION INTRAVENOUS at 07:31

## 2024-07-12 RX ADMIN — SODIUM CHLORIDE, POTASSIUM CHLORIDE, SODIUM LACTATE AND CALCIUM CHLORIDE: 600; 310; 30; 20 INJECTION, SOLUTION INTRAVENOUS at 06:41

## 2024-07-12 RX ADMIN — LIDOCAINE HYDROCHLORIDE 100 MG: 20 INJECTION, SOLUTION EPIDURAL; INFILTRATION; INTRACAUDAL; PERINEURAL at 07:31

## 2024-07-12 RX ADMIN — PROPOFOL 30 MG: 10 INJECTION, EMULSION INTRAVENOUS at 07:39

## 2024-07-12 RX ADMIN — PROPOFOL 50 MG: 10 INJECTION, EMULSION INTRAVENOUS at 07:35

## 2024-07-12 RX ADMIN — HYDROMORPHONE HYDROCHLORIDE 2 MG: 2 TABLET ORAL at 08:23

## 2024-07-12 RX ADMIN — PROPOFOL 50 MG: 10 INJECTION, EMULSION INTRAVENOUS at 07:42

## 2024-07-12 RX ADMIN — FENTANYL CITRATE 100 MCG: 50 INJECTION, SOLUTION INTRAMUSCULAR; INTRAVENOUS at 07:31

## 2024-07-12 RX ADMIN — SODIUM CHLORIDE, SODIUM LACTATE, POTASSIUM CHLORIDE, AND CALCIUM CHLORIDE: .6; .31; .03; .02 INJECTION, SOLUTION INTRAVENOUS at 07:23

## 2024-07-12 ASSESSMENT — PAIN SCALES - GENERAL
PAINLEVEL_OUTOF10: 4

## 2024-07-12 ASSESSMENT — PAIN - FUNCTIONAL ASSESSMENT
PAIN_FUNCTIONAL_ASSESSMENT: 0-10
PAIN_FUNCTIONAL_ASSESSMENT: PREVENTS OR INTERFERES SOME ACTIVE ACTIVITIES AND ADLS
PAIN_FUNCTIONAL_ASSESSMENT: NONE - DENIES PAIN

## 2024-07-12 ASSESSMENT — PAIN DESCRIPTION - DESCRIPTORS: DESCRIPTORS: DULL

## 2024-07-12 NOTE — PROGRESS NOTES
PACU Transfer to [facility]    Vitals:    07/12/24 0824   BP: 117/82   Pulse: 65   Resp: 13   Temp: 97.2 °F (36.2 °C)   SpO2: 100%         Intake/Output Summary (Last 24 hours) at 7/12/2024 0829  Last data filed at 7/12/2024 0824  Gross per 24 hour   Intake 890 ml   Output --   Net 890 ml       Pain assessment:  receiving treatment  Pain Level: 4    Patient transferred to care of SAIRA RN.    7/12/2024 8:29 AM

## 2024-07-12 NOTE — PROGRESS NOTES
Patient admitted to PACU bed #  16  from [facility] @ 0723 in preperation for  Left Knee   manipulation per Dr. Sutherland.  Patient attached to PACU monitoring system.  Patient, procedure and site confirmed with Dr. Sutherland and anesthesia provider at bedside.  Manipulation began at 0731 and ended at 0739.  Pre-procedure flexion 80 degrees; post-procedure flexion 105 degrees. Anesthesia monitored VS's and maintained airway during procedure. Anesthesia start 0723 end time and hand off time 0751

## 2024-07-12 NOTE — ANESTHESIA POSTPROCEDURE EVALUATION
Department of Anesthesiology  Postprocedure Note    Patient: Galindo Dickson  MRN: [id number]  YOB: 1955  Date of evaluation: 7/12/2024    Procedure Summary       Date: 07/12/24 Room / Location: [facility] PACU    Anesthesia Start: 0723 Anesthesia Stop: 0751    Procedure: JOINT MANIPULATION Diagnosis:     Scheduled Providers:  Responsible Provider: Ron Jade MD    Anesthesia Type: MAC ASA Status: 2            Anesthesia Type: No value filed.    Emeterio Phase I: Emeterio Score: 10    Emeterio Phase II:      Anesthesia Post Evaluation    Patient location during evaluation: PACU  Patient participation: complete - patient participated  Level of consciousness: awake and alert  Airway patency: patent  Nausea & Vomiting: no nausea and no vomiting  Cardiovascular status: hemodynamically stable  Respiratory status: acceptable  Hydration status: euvolemic  Multimodal analgesia pain management approach  Pain management: satisfactory to patient    No notable events documented.

## 2024-07-12 NOTE — OP NOTE
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    OPERATIVE PROCEDURE:  Gentle manipulation under chemical analgesia.    OPERATIVE INDICATIONS:  This patient has had the abrupt onset of a moderate arthrofibrosis early after a total knee joint replacement and has undergone a manipulation and arthroscopic debridement of scar tissue where a significant inflammation in the tissues was present, and now the need for second manipulation because he has been unable to regain his knee flexion.  He has undergone multiple tests for infection which to date including cultures of tissue have all been negative.  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  However, it is imperative that continued close diligence for an infectious etiology be maintained.  The Synovasure analysis was also negative as was the cellular analysis of the fluid.    OPERATIVE PROCEDURE:  After an adequate level of chemical analgesia and identification of the signed limb and the time-out procedure, a very gentle manipulation was performed.  Patellar mobilization was performed and this is still about 50% restricted, but he does have a medial lateral glide of about 10 mm.  Full knee extension is present.  At 80

## 2024-07-12 NOTE — H&P
about 2 years ago. His smoking use included cigarettes. He has a 7.5 pack-year smoking history. He has never used smokeless tobacco.  ETOH:   reports that he does not currently use alcohol.  Patient currently lives with spouse     Family History:       Problem Relation Age of Onset    Cancer Father         Prostate ca in 60's    Heart Attack Brother         At age 47    Coronary Art Dis Brother     Diabetes Brother        REVIEW OF SYSTEMS:  CONSTITUTIONAL:  Neg   Recent weight changes,fatigue,fever,chills or night sweats  EYES:  Neg  blurriness,tearing,itching or acute change in vision  EARS:  Neg   hearing loss,tinnitus,vertigo,discharge or earache.  NOSE:  Neg  rhinorrhea,sneezing,itching,allergy or epistaxis  MOUTH/THROAT:  Neg  bleeding gums,hoarseness or sore throat.  RESPIRATORY:   Neg SOB,wheeze,cough,sputum,hemoptysis or brnochitis  CARDIOVASCULAR   Neg : chest pain,palpitations,dyspnea on exertion,orthopnea,paroxysmal nocturnal dyspnea or edema  GASTROINTESTINAL:  Neg   Appetite changes,nausea,vomiting,or diarrhea,indigestion,dysphagia,change in bowel movements, or abdominal pain.  GENITOURINARY:  Neg  Urinary frequency,hesitancy,urgency,polyuria,dysuria,hematuria,nocturia,or incontinence.  HEMATOLOGIC/LYMPHATIC:  Neg  Anemia,bleeding tendency  MUSCULOSKELETAL:  Pos   joint pain,swelling and stiffness  NEUROLOGICAL:  Neg  Loss of Consciousness,memeory loss,forgetfulness,periods of confusion,difficulty concentrating,seizures,decline in intellect,nervousness,insomina,aphasia or dysarthria.  SKIN :  Neg  well healed incision, skin or hair changes,and has no itching,rashes,sores.Denies breast lumps,masses,pain or discharge.  PSYCHIATRIC:  Neg depression,personality changes,anxiety.  ENDOCRINE:  Neg polydipsia,polyuria,abnormal weight changes,heat /cold intolerance.  ALL/IMM :  Neg reactions to drugs other than listed,food,insects,skin rash,trouble breathing,local or general lymph node enlargement or

## 2024-07-12 NOTE — ANESTHESIA PRE PROCEDURE
Department of Anesthesiology  Preprocedure Note       Name:  Galindo Dickson   Age:  68 y.o.  :  1955                                          MRN:  [id number]         Date:  2024      Surgeon: * No surgeons listed *    Procedure: * No procedures listed *    Medications prior to admission:   Prior to Admission medications    Medication Sig Start Date End Date Taking? Authorizing Provider   HYDROmorphone (DILAUDID) 2 MG tablet Take 1 tablet by mouth every 6 hours as needed for Pain for up to 5 days. Max Daily Amount: 8 mg 24  Shai Nuñez PA-C   predniSONE (DELTASONE) 10 MG tablet Take 1 tablet by mouth daily for 14 days 24  Shai Nuñez PA-C   pravastatin (PRAVACHOL) 40 MG tablet Take 1 tablet by mouth daily 24   Tamiko Conteh MD   aspirin 81 MG EC tablet Take 1 tablet by mouth in the morning and at bedtime  Patient taking differently: Take 1 tablet by mouth daily 24   Luis Mckinnon MD   senna (SENOKOT) 8.6 MG TABS tablet Take 1 tablet by mouth daily 24   Luis Mckinnon MD   Calcium-Magnesium-Vitamin D (CALCIUM 1200+D3 PO)  23   ProviderWilfredo MD   tamsulosin (FLOMAX) 0.4 MG capsule Take 1 capsule by mouth daily 24   Talon Campbell MD   NONFORMULARY SMOKES MARIJUANA    Provider, MD Wilfredo   Handicap Placard MISC by Does not apply route One year 24   Shai Nuñez PA-C   Flaxseed, Linseed, (FLAX SEED OIL) 1000 MG CAPS Take  by mouth.    ProviderWilfredo MD       Current medications:    Current Outpatient Medications   Medication Sig Dispense Refill   • HYDROmorphone (DILAUDID) 2 MG tablet Take 1 tablet by mouth every 6 hours as needed for Pain for up to 5 days. Max Daily Amount: 8 mg 20 tablet 0   • predniSONE (DELTASONE) 10 MG tablet Take 1 tablet by mouth daily for 14 days 14 tablet 0   • pravastatin (PRAVACHOL) 40 MG tablet Take 1 tablet by mouth daily 90 tablet 0   • aspirin 81 MG EC tablet Take 1 tablet by mouth in

## 2024-07-12 NOTE — DISCHARGE INSTRUCTIONS
PATIENT INSTRUCTIONS FOLLOWING MANIPULATION      1. Elevate the operated area above heart level and apply ice bag 20 minutes of every hour to operative extremity.  2. Any dressing you may have will be removed in the clinic / PT. DO NOT remove sutures, staples or Steri-strips if you have any.  3. Use crutches as needed.  4. Weight bearing with crutches for assistance to operative extremity.  5. Ankle pumps frequently  6. Follow up with previously scheduled appointment or call the office for an appointment if you do not already have one.  7. Call your doctor if:   Your incision becomes red, swollen or develops drainage  If the wound becomes increasingly painful uncontrolled with the pain medications.   If you develop fever greater than 101 degrees after the first 48 hours.  8. Please call with questions/concerns.      Board Certified Orthopaedic Surgeon  President and Medical Director  [facility] and [facility] AMBULATORY PROCEDURE DISCHARGE INSTRUCTIONS    There are potential side effects of anesthesia or sedation you may experience for the first 24 hours.  These side effects include:    Confusion or Memory loss, Dizziness, or Delayed Reaction Times   [x]A responsible person should be with you for the next 24 hours.  Do not operate any vehicles (automobiles, bicycles, motorcycles) or power tools or machinery for 24 hours.  Do not sign any legal documents or make any legal decisions for 24 hours. Do not drink alcohol for 24 hours or while taking narcotic pain medication.      Nausea    [x]Start with light diet and progress to your normal diet as you feel like eating. However, if you experience nausea or repeated episodes of vomiting which persist beyond 12-24 hours, notify your physician.  Once nausea has passed, remember to keep drinking fluids.    Difficulty Passing Urine  [x]Drink extra amounts of fluid today.  Notify your physician if you have not urinated

## 2024-07-12 NOTE — PROGRESS NOTES
0835 pt alert, denies pain, left leg knee sl swollen, ice to left knee, steri strips in place. Pt states he called his son and he is on his way to pick him up. Pt refusing anything po, just wants to go home.  0845 VSS IV removed.  0850 Shai PA in to see and talk to pt. Shai states he sent pt's pharmacy medications for pt to take, pt aware to pick these up.  0900 pt to car per w/c, home per son. D/C instructions given at care side to pt and his son prior to leaving.    Ambulatory Surgery/Procedure Discharge Note    Vitals:    07/12/24 0839   BP: 120/80   Pulse: 61   Resp: 15   Temp: 97 °F (36.1 °C)   SpO2: 100%       In: 890 [P.O.:240; I.V.:650]  Out: -     Restroom use offered before discharge.  Yes    Pain assessment:  none  Pain Level: 4        Patient discharged to home/self care. Patient discharged via wheel chair by transporter to waiting family/S.O.       7/12/2024 9:07 AM

## 2024-07-15 ENCOUNTER — HOSPITAL ENCOUNTER (OUTPATIENT)
Dept: PHYSICAL THERAPY | Age: 69
Setting detail: THERAPIES SERIES
End: 2024-07-15
Payer: COMMERCIAL

## 2024-07-16 ENCOUNTER — HOSPITAL ENCOUNTER (OUTPATIENT)
Dept: PHYSICAL THERAPY | Age: 69
Setting detail: THERAPIES SERIES
Discharge: HOME | End: 2024-07-16
Payer: MEDICARE

## 2024-07-16 ENCOUNTER — OFFICE VISIT (OUTPATIENT)
Dept: ORTHOPEDIC SURGERY | Age: 69
End: 2024-07-16

## 2024-07-16 VITALS — BODY MASS INDEX: 24.73 KG/M2 | HEIGHT: 69 IN | WEIGHT: 167 LBS

## 2024-07-16 DIAGNOSIS — Z98.890 S/P LEFT KNEE ARTHROSCOPY: ICD-10-CM

## 2024-07-16 DIAGNOSIS — Z09 POSTOP CHECK: Primary | ICD-10-CM

## 2024-07-16 DIAGNOSIS — M24.662 FIBROSIS OF LEFT KNEE JOINT: ICD-10-CM

## 2024-07-16 DIAGNOSIS — Z96.652 S/P TOTAL KNEE ARTHROPLASTY, LEFT: ICD-10-CM

## 2024-07-16 LAB
ACID FAST STN SPEC QL: NORMAL
MYCOBACTERIUM SPEC CULT: NORMAL

## 2024-07-16 PROCEDURE — 97140 MANUAL THERAPY 1/> REGIONS: CPT | Performed by: PHYSICAL THERAPY ASSISTANT

## 2024-07-16 PROCEDURE — 97110 THERAPEUTIC EXERCISES: CPT | Performed by: PHYSICAL THERAPY ASSISTANT

## 2024-07-16 PROCEDURE — 97016 VASOPNEUMATIC DEVICE THERAPY: CPT | Performed by: PHYSICAL THERAPY ASSISTANT

## 2024-07-16 RX ORDER — LIDOCAINE HYDROCHLORIDE 10 MG/ML
2 INJECTION, SOLUTION EPIDURAL; INFILTRATION; INTRACAUDAL; PERINEURAL ONCE
Status: COMPLETED | OUTPATIENT
Start: 2024-07-16 | End: 2024-07-16

## 2024-07-16 RX ORDER — HYDROMORPHONE HYDROCHLORIDE 2 MG/1
2 TABLET ORAL EVERY 6 HOURS PRN
Qty: 20 TABLET | Refills: 0 | Status: SHIPPED | OUTPATIENT
Start: 2024-07-16 | End: 2024-07-21

## 2024-07-16 RX ADMIN — LIDOCAINE HYDROCHLORIDE 2 ML: 10 INJECTION, SOLUTION EPIDURAL; INFILTRATION; INTRACAUDAL; PERINEURAL at 11:56

## 2024-07-16 NOTE — PROGRESS NOTES
After informed consent was provided phlebotomy was performed to obtain approximately 60 mL of blood.  The blood was spun down in process yielding 7 mL of PRP.  The patient was seated on the exam table with the left knee flexed to 90 degrees. The anterolateral aspect of the knee adjacent to the joint line was double prepped with Chlora-prep. The skin and subcutaneous tissues were anesthetized with ethyl chloride spray. A 20-gauge needle was inserted into the right knee and 2 ml of 1% lidocaine was injected.  The needle was left in place and the syringe removed.  Attached was a 10 mL syringe filled with 7 mL of PRP.  The PRP was injected without resistance. The needle was withdrawn and the puncture site sealed with a Band-Aid. The patient tolerated the procedure well. Post injection instructions and precautions given and any problems to notify us.        Encounter Diagnoses   Name Primary?    Postop check Yes    Fibrosis of left knee joint     S/P total knee arthroplasty, left              Shai Nuñez PA-C    Physician Assistant - Certified  Orthopedic Surgery   [facility]    07/16/24 5:31 PM

## 2024-07-16 NOTE — FLOWSHEET NOTE
[facility]- Outpatient Rehabilitation and Therapy [address], [place] [address] office: [phone] fax: [phone]    Physical Therapy: TREATMENT/PROGRESS NOTE   Patient: Galindo Dickson (68 y.o. male)   Examination Date: 2024   :  1955 MRN: [id number]   Visit #: 13   Insurance Allowable Auth Needed   [place] []Yes    [x]No    Insurance: Payor: MEDICARE / Plan: MEDICARE PART A AND B / Product Type: *No Product type* /   Insurance ID: [id number] - (Medicare)  Secondary Insurance (if applicable): [facility]   Treatment Diagnosis:     ICD-10-CM    1. Left knee pain, unspecified chronicity  M25.562       2. Weakness  R53.1       3. Gait abnormality  R26.9          Medical Diagnosis:  Left knee pain [M25.562]  S/P L TKR & MCL repair 24  S/P RICO + qpump 24  S/P MIRELA + RICO 24  S/P RICO 24   Referring Physician: Brian Sutherland MD  PCP: Tamiko Conteh MD       Plan of care signed (Y/N): Y    Date of Patient follow up with Physician: Patient was seen in conjunction with Dr. Brian Sutherland to establish initial/subsequent patient care plan.    24: seen by JAVIER Corona - had to do a full repair of MCL with anchors and sutures along the length of the MCL; fig-4 ROM to 90 deg max for the first 2 weeks at least; thigh discomfort is due to tourniquet; okay to take tylenol between doses of oxycodone, tylenol PM at night or 2 tabs of oxycodone at bedtime to help get more longevity and better sleep  24: seen in MD office; RICO scheduled for 24: med discussion, RICO tomorrow, MDP will be prescribed again tomorrow  24: seen by Shai - leave Qpump in until it runs out; immobilizer can be d/c when qpump is done; push ROM  24: seen by Dr Sutherland-Cultures taken during surgery-awaiting results. Synovium was very inflamed.  Currently taking anti-biotics.  Reached 110 degrees of flexion in surgery.  Begin Celebrex/MDP program.     24:

## 2024-07-18 ENCOUNTER — HOSPITAL ENCOUNTER (OUTPATIENT)
Dept: PHYSICAL THERAPY | Age: 69
Setting detail: THERAPIES SERIES
Discharge: HOME | End: 2024-07-18
Payer: MEDICARE

## 2024-07-18 PROCEDURE — 97140 MANUAL THERAPY 1/> REGIONS: CPT | Performed by: PHYSICAL THERAPY ASSISTANT

## 2024-07-18 PROCEDURE — 97016 VASOPNEUMATIC DEVICE THERAPY: CPT | Performed by: PHYSICAL THERAPY ASSISTANT

## 2024-07-18 PROCEDURE — 97110 THERAPEUTIC EXERCISES: CPT | Performed by: PHYSICAL THERAPY ASSISTANT

## 2024-07-18 NOTE — FLOWSHEET NOTE
seen by Dr. Sutherland- cultures taken during surgery are all negative. Incision healing well from surgery but developing arthrofibrosis. Plan for RICO as soon as possible. Will update medications after surgery.  7-18-24: repeat PRP in 3-4 weeks, cont prednisone for 3-4 weeks then decrease dose     Progress Report/POC: NO  POC update due: (10 visits /OR AUTH LIMITS, whichever is less)  7/27/2024                                             Precautions/ Contra-indications:           Latex allergy:  NO  Pacemaker:    NO  Contraindications for Manipulation: None  Date of Surgery: L TKR w/ MCL repair 5/8/24; RICO 5/29/24, MIRELA/RICO 6/26/24, RICO 7-12-24  Other:    Red Flags:  None    C-SSRS Triggered by Intake questionnaire:   Patient answered 'NO' to both behavioral questions on intake.  No further screening warranted    Preferred Language for Healthcare:   [x] English       [] other:    SUBJECTIVE EXAMINATION     Patient stated complaint: Pt presents 6 days after RICO. MD advised pt to decrease exercises to 2x/day- feels that knee is extremely inflamed and will require some rest. Pt states he is feeling optimistic about current course. Received PRP injectionTuesday. Feels that he was able to get a little further on ERMI over weekend. Reports being stopped by extreme Taking prednisone 10 mg BID and hydro morphine.      Test used Initial score  5/7/24 Post-op  05/9/2024   5/29/24 5/31/24 6/3/24 6/6/24 6/10/24 6/17/24 6/21/24 6/24/24 6/27/24 6/28/24 7/5/24 7/8/24 7/18/24   Pain Summary VAS 0-9/10 7-8/10 3-7/10 4-7/10 3-7/10   4-7/10 3-7/10  3-4/10  3-4/10     Functional questionnaire WOMAC 36% deficit 88% deficit         65% deficit       Other:                   Flexion ROM    72 manual  85 ERMI 87 ERMI 78 ERMI 78 ERMI 87 ERMI 80 ERMI 81 ERMI 62 EOB 78 EOB/84 ERMI 85ERMI 69 cold/84 EOB/85 ERMI /93 ERMI   Ext ROM    -8 -6 best -6 best -6 cold/-4 end of session -8 cold/-3 end of session -7 cold/-3 with light OP after stretching -7

## 2024-07-19 ENCOUNTER — APPOINTMENT (OUTPATIENT)
Dept: PHYSICAL THERAPY | Age: 69
End: 2024-07-19
Payer: COMMERCIAL

## 2024-07-22 ENCOUNTER — HOSPITAL ENCOUNTER (OUTPATIENT)
Dept: PHYSICAL THERAPY | Age: 69
Setting detail: THERAPIES SERIES
Discharge: HOME | End: 2024-07-22
Payer: COMMERCIAL

## 2024-07-22 DIAGNOSIS — Z96.652 S/P TOTAL KNEE ARTHROPLASTY, LEFT: ICD-10-CM

## 2024-07-22 LAB
FUNGUS SPEC CULT: NORMAL
LOEFFLER MB STN SPEC: NORMAL

## 2024-07-22 PROCEDURE — 97110 THERAPEUTIC EXERCISES: CPT | Performed by: PHYSICAL THERAPY ASSISTANT

## 2024-07-22 PROCEDURE — 97140 MANUAL THERAPY 1/> REGIONS: CPT | Performed by: PHYSICAL THERAPY ASSISTANT

## 2024-07-22 PROCEDURE — 97016 VASOPNEUMATIC DEVICE THERAPY: CPT | Performed by: PHYSICAL THERAPY ASSISTANT

## 2024-07-22 RX ORDER — OXYCODONE HYDROCHLORIDE 5 MG/1
5 TABLET ORAL EVERY 6 HOURS PRN
Qty: 112 TABLET | Refills: 0 | Status: SHIPPED | OUTPATIENT
Start: 2024-07-22 | End: 2024-08-19

## 2024-07-22 RX ORDER — PREDNISONE 10 MG/1
10 TABLET ORAL DAILY
Qty: 30 TABLET | Refills: 0 | Status: SHIPPED | OUTPATIENT
Start: 2024-07-22 | End: 2024-08-21

## 2024-07-22 RX ORDER — SENNOSIDES 8.6 MG
1 TABLET ORAL DAILY
Qty: 120 TABLET | Refills: 0 | Status: SHIPPED | OUTPATIENT
Start: 2024-07-22

## 2024-07-22 NOTE — FLOWSHEET NOTE
restriction to work towards return to prior level of function.  Status: [x] Progressing: [] Met: [x] Not Met: [] Adjusted  Patient will increase LE function to allow independence in all self-care activities.             Status: [x] Progressing: [] Met: [x] Not Met: [] Adjusted      TREATMENT PLAN     Frequency/Duration: 2-3x/week for 8 weeks for the following treatment interventions:    Interventions:  Therapeutic Exercise (50896) including: strength training, ROM, and functional mobility  Therapeutic Activities (81212) including: functional mobility training and education.  Neuromuscular Re-education (85195) activation and proprioception, including postural re-education.    Gait Training (06499) for normalization of ambulation patterns and AD training.   Manual Therapy (84472) as indicated to include: Passive Range of Motion, Gr I-IV mobilizations, and Soft Tissue Mobilization  Modalities as needed that may include: Cryotherapy, Electrical Stimulation, and Vasoneumatic Compression    Plan: Cont POC- Continue emphasis/focus on exercise progression, improving proper muscle recruitment and activation/motor control patterns, modulating pain, increasing ROM, and reduce/eliminate soft tissue swelling/inflammation/restriction. Next visit plan to progress reps and add new exercises    Pt will return to [place] in 30 days for 2nd PRP injection and PT x 1-2 weeks.    Electronically Signed by Bev Brandt PTA Date: 07/22/2024     Note: Portions of this note have been templated and/or copied from initial evaluation, reassessments and prior notes for documentation efficiency.

## 2024-07-23 LAB
ACID FAST STN SPEC QL: NORMAL
MYCOBACTERIUM SPEC CULT: NORMAL

## 2024-07-25 ENCOUNTER — APPOINTMENT (OUTPATIENT)
Dept: PHYSICAL THERAPY | Age: 69
End: 2024-07-25
Payer: COMMERCIAL

## 2024-07-26 ENCOUNTER — APPOINTMENT (OUTPATIENT)
Dept: PHYSICAL THERAPY | Age: 69
End: 2024-07-26
Payer: COMMERCIAL

## 2024-07-29 ENCOUNTER — APPOINTMENT (OUTPATIENT)
Dept: PHYSICAL THERAPY | Age: 69
End: 2024-07-29
Payer: COMMERCIAL

## 2024-07-29 LAB
FUNGUS SPEC CULT: NORMAL
LOEFFLER MB STN SPEC: NORMAL

## 2024-07-30 LAB
ACID FAST STN SPEC QL: NORMAL
MYCOBACTERIUM SPEC CULT: NORMAL

## 2024-08-02 ENCOUNTER — APPOINTMENT (OUTPATIENT)
Dept: PHYSICAL THERAPY | Age: 69
End: 2024-08-02
Payer: COMMERCIAL

## 2024-08-06 LAB
ACID FAST STN SPEC QL: NORMAL
MYCOBACTERIUM SPEC CULT: NORMAL

## 2024-08-12 ENCOUNTER — HOSPITAL ENCOUNTER (OUTPATIENT)
Dept: LAB | Age: 69
Discharge: HOME | End: 2024-08-15
Payer: COMMERCIAL

## 2024-08-12 ENCOUNTER — OFFICE VISIT (OUTPATIENT)
Dept: ONCOLOGY | Age: 69
End: 2024-08-12
Payer: MEDICARE

## 2024-08-12 ENCOUNTER — HOSPITAL ENCOUNTER (OUTPATIENT)
Dept: INFUSION THERAPY | Age: 69
Setting detail: INFUSION SERIES
Discharge: HOME | End: 2024-08-12
Payer: COMMERCIAL

## 2024-08-12 VITALS
DIASTOLIC BLOOD PRESSURE: 73 MMHG | OXYGEN SATURATION: 99 % | BODY MASS INDEX: 25.48 KG/M2 | RESPIRATION RATE: 20 BRPM | HEIGHT: 69 IN | HEART RATE: 82 BPM | TEMPERATURE: 98.5 F | SYSTOLIC BLOOD PRESSURE: 119 MMHG | WEIGHT: 172 LBS

## 2024-08-12 DIAGNOSIS — Z19.1 HORMONE SENSITIVE MALIGNANCY STATUS: Primary | ICD-10-CM

## 2024-08-12 DIAGNOSIS — C61 PRIMARY MALIGNANT NEOPLASM OF PROSTATE (HCC): Primary | ICD-10-CM

## 2024-08-12 DIAGNOSIS — C61 PRIMARY MALIGNANT NEOPLASM OF PROSTATE (HCC): ICD-10-CM

## 2024-08-12 LAB
ALBUMIN SERPL-MCNC: 3.7 G/DL (ref 3.2–4.6)
ALBUMIN/GLOB SERPL: 1.2 (ref 1–1.9)
ALP SERPL-CCNC: 74 U/L (ref 40–129)
ALT SERPL-CCNC: 20 U/L (ref 12–65)
ANION GAP SERPL CALC-SCNC: 11 MMOL/L (ref 9–18)
AST SERPL-CCNC: 21 U/L (ref 15–37)
BASOPHILS # BLD: 0 K/UL (ref 0–0.2)
BASOPHILS NFR BLD: 0 % (ref 0–2)
BILIRUB SERPL-MCNC: 0.2 MG/DL (ref 0–1.2)
BUN SERPL-MCNC: 21 MG/DL (ref 8–23)
CALCIUM SERPL-MCNC: 9.4 MG/DL (ref 8.8–10.2)
CHLORIDE SERPL-SCNC: 103 MMOL/L (ref 98–107)
CO2 SERPL-SCNC: 26 MMOL/L (ref 20–28)
CREAT SERPL-MCNC: 0.84 MG/DL (ref 0.8–1.3)
DIFFERENTIAL METHOD BLD: ABNORMAL
EOSINOPHIL # BLD: 0.1 K/UL (ref 0–0.8)
EOSINOPHIL NFR BLD: 2 % (ref 0.5–7.8)
ERYTHROCYTE [DISTWIDTH] IN BLOOD BY AUTOMATED COUNT: 15.3 % (ref 11.9–14.6)
GLOBULIN SER CALC-MCNC: 3.2 G/DL (ref 2.3–3.5)
GLUCOSE SERPL-MCNC: 105 MG/DL (ref 70–99)
HCT VFR BLD AUTO: 39.8 % (ref 41.1–50.3)
HGB BLD-MCNC: 12.8 G/DL (ref 13.6–17.2)
IMM GRANULOCYTES # BLD AUTO: 0 K/UL (ref 0–0.5)
IMM GRANULOCYTES NFR BLD AUTO: 0 % (ref 0–5)
LYMPHOCYTES # BLD: 0.8 K/UL (ref 0.5–4.6)
LYMPHOCYTES NFR BLD: 11 % (ref 13–44)
MCH RBC QN AUTO: 28.8 PG (ref 26.1–32.9)
MCHC RBC AUTO-ENTMCNC: 32.2 G/DL (ref 31.4–35)
MCV RBC AUTO: 89.4 FL (ref 82–102)
MONOCYTES # BLD: 0.4 K/UL (ref 0.1–1.3)
MONOCYTES NFR BLD: 7 % (ref 4–12)
NEUTS SEG # BLD: 5.3 K/UL (ref 1.7–8.2)
NEUTS SEG NFR BLD: 80 % (ref 43–78)
NRBC # BLD: 0 K/UL (ref 0–0.2)
PLATELET # BLD AUTO: 256 K/UL (ref 150–450)
PMV BLD AUTO: 9.3 FL (ref 9.4–12.3)
POTASSIUM SERPL-SCNC: 4.4 MMOL/L (ref 3.5–5.1)
PROT SERPL-MCNC: 6.9 G/DL (ref 6.3–8.2)
PSA SERPL-MCNC: <0.1 NG/ML (ref 0–4)
RBC # BLD AUTO: 4.45 M/UL (ref 4.23–5.6)
SODIUM SERPL-SCNC: 140 MMOL/L (ref 136–145)
WBC # BLD AUTO: 6.7 K/UL (ref 4.3–11.1)

## 2024-08-12 PROCEDURE — 96402 CHEMO HORMON ANTINEOPL SQ/IM: CPT

## 2024-08-12 PROCEDURE — 1123F ACP DISCUSS/DSCN MKR DOCD: CPT | Performed by: INTERNAL MEDICINE

## 2024-08-12 PROCEDURE — 85025 COMPLETE CBC W/AUTO DIFF WBC: CPT

## 2024-08-12 PROCEDURE — 36415 COLL VENOUS BLD VENIPUNCTURE: CPT

## 2024-08-12 PROCEDURE — 3017F COLORECTAL CA SCREEN DOC REV: CPT | Performed by: INTERNAL MEDICINE

## 2024-08-12 PROCEDURE — 84153 ASSAY OF PSA TOTAL: CPT

## 2024-08-12 PROCEDURE — 80053 COMPREHEN METABOLIC PANEL: CPT

## 2024-08-12 PROCEDURE — 1036F TOBACCO NON-USER: CPT | Performed by: INTERNAL MEDICINE

## 2024-08-12 PROCEDURE — G8417 CALC BMI ABV UP PARAM F/U: HCPCS | Performed by: INTERNAL MEDICINE

## 2024-08-12 PROCEDURE — G8428 CUR MEDS NOT DOCUMENT: HCPCS | Performed by: INTERNAL MEDICINE

## 2024-08-12 PROCEDURE — 6360000002 HC RX W HCPCS: Performed by: INTERNAL MEDICINE

## 2024-08-12 PROCEDURE — 99214 OFFICE O/P EST MOD 30 MIN: CPT | Performed by: INTERNAL MEDICINE

## 2024-08-12 RX ORDER — SODIUM CHLORIDE 9 MG/ML
INJECTION, SOLUTION INTRAVENOUS CONTINUOUS
OUTPATIENT
Start: 2024-10-14

## 2024-08-12 RX ORDER — ACETAMINOPHEN 325 MG/1
650 TABLET ORAL
OUTPATIENT
Start: 2024-10-14

## 2024-08-12 RX ORDER — EPINEPHRINE 1 MG/ML
0.3 INJECTION, SOLUTION, CONCENTRATE INTRAVENOUS PRN
OUTPATIENT
Start: 2024-10-14

## 2024-08-12 RX ORDER — ONDANSETRON 2 MG/ML
8 INJECTION INTRAMUSCULAR; INTRAVENOUS
OUTPATIENT
Start: 2024-10-14

## 2024-08-12 RX ORDER — ALBUTEROL SULFATE 90 UG/1
4 AEROSOL, METERED RESPIRATORY (INHALATION) PRN
OUTPATIENT
Start: 2024-10-14

## 2024-08-12 RX ORDER — DIPHENHYDRAMINE HYDROCHLORIDE 50 MG/ML
50 INJECTION INTRAMUSCULAR; INTRAVENOUS
OUTPATIENT
Start: 2024-10-14

## 2024-08-12 RX ADMIN — LEUPROLIDE ACETATE 22.5 MG: 22.5 INJECTION, SUSPENSION, EXTENDED RELEASE SUBCUTANEOUS at 12:33

## 2024-08-12 ASSESSMENT — PATIENT HEALTH QUESTIONNAIRE - PHQ9
2. FEELING DOWN, DEPRESSED OR HOPELESS: NOT AT ALL
SUM OF ALL RESPONSES TO PHQ QUESTIONS 1-9: 0
1. LITTLE INTEREST OR PLEASURE IN DOING THINGS: NOT AT ALL
SUM OF ALL RESPONSES TO PHQ QUESTIONS 1-9: 0
SUM OF ALL RESPONSES TO PHQ QUESTIONS 1-9: 0
SUM OF ALL RESPONSES TO PHQ9 QUESTIONS 1 & 2: 0
SUM OF ALL RESPONSES TO PHQ QUESTIONS 1-9: 0

## 2024-08-12 NOTE — PATIENT INSTRUCTIONS
08/12/2024 140  136 - 145 mmol/L Final    Potassium 08/12/2024 4.4  3.5 - 5.1 mmol/L Final    Chloride 08/12/2024 103  98 - 107 mmol/L Final    CO2 08/12/2024 26  20 - 28 mmol/L Final    Anion Gap 08/12/2024 11  9 - 18 mmol/L Final    Glucose 08/12/2024 105 (H)  70 - 99 mg/dL Final    Comment: <70 mg/dL Consistent with, but not fully diagnostic of hypoglycemia.  100 - 125 mg/dL Impaired fasting glucose/consistent with pre-diabetes mellitus.  > 126 mg/dl Fasting glucose consistent with overt diabetes mellitus      BUN 08/12/2024 21  8 - 23 MG/DL Final    Creatinine 08/12/2024 0.84  0.80 - 1.30 MG/DL Final    Est, Glom Filt Rate 08/12/2024 >90  >60 ml/min/1.73m2 Final    Comment:    Pediatric calculator link: https://www.kidney.org/professionals/kdoqi/gfr_calculatorped     These results are not intended for use in patients <18 years of age.     eGFR results are calculated without a race factor using  the 2021 CKD-EPI equation. Careful clinical correlation is recommended, particularly when comparing to results calculated using previous equations.  The CKD-EPI equation is less accurate in patients with extremes of muscle mass, extra-renal metabolism of creatinine, excessive creatine ingestion, or following therapy that affects renal tubular secretion.      Calcium 08/12/2024 9.4  8.8 - 10.2 MG/DL Final    Total Bilirubin 08/12/2024 0.2  0.0 - 1.2 MG/DL Final    ALT 08/12/2024 20  12 - 65 U/L Final    AST 08/12/2024 21  15 - 37 U/L Final    Alk Phosphatase 08/12/2024 74  40 - 129 U/L Final    Total Protein 08/12/2024 6.9  6.3 - 8.2 g/dL Final    Albumin 08/12/2024 3.7  3.2 - 4.6 g/dL Final    Globulin 08/12/2024 3.2  2.3 - 3.5 g/dL Final    Albumin/Globulin Ratio 08/12/2024 1.2  1.0 - 1.9   Final    PSA 08/12/2024 <0.1  0.0 - 4.0 ng/mL Final    Comment: Roche ECLIA methodology  Patient's results of tumor marker testing may not be comparable to labs using different manufacturers/methods.           Please refer to After

## 2024-08-12 NOTE — PROGRESS NOTES
Arrived to the [facility]fusion [facility].  Eligard injection completed.   Provided education on same    Patient instructed to report any side affects to ordering provider.  Patient tolerated well.   Any issues or concerns during appointment: none.  Discharged ambulatory.

## 2024-08-12 NOTE — PROGRESS NOTES
[facility]: Office Visit Established Patient    Chief Complaint:    Chief Complaint   Patient presents with    Follow-up         History of Present Illness:  Mr. Dickson is a 68 y.o. male who presents today for follow-up regarding prostate cancer.  He underwent RALP in 2022 with path showing T3a disease with 1 of 13 nodes involved, GS 7, his PSA dropped to undetectable but he was still offered adjuvant therapy due to N1 disease, he completed EBRT with combined ADT, he is due for approximately 6 more months of therapy, but his most recent Eligard injection was denied by insurance and he is now on Orgovyx.  He has tolerated this well, he has about another month's supply on hand.  He has relocated to [place] and seeking to [facility] oncology care.  We will attempt to get Eligard or Lupron approved for him here, he does not mind either the Lupron or the Orgovyx but from a convenience standpoint it would be nice to avoid daily administration.  If this is not approved, we will send a prescription for Orgovyx to speciality pharmacy.  Check PSA at next visit, recent measurements have been undetectable.  No role for imaging at this time, for either PCa or LAMN.        Here for follow-up.  He did get his Eligard injection on 5/2/24, this went fine with no unexpected complications.  His main issues have been related to his knee, he has required multiple surgeries and is now getting PRP treatment which he believes is helping.       Review of Systems:  Constitutional: Positive for fatigue.   HENT: Negative.   Eyes: Negative.   Respiratory: Negative.   Cardiovascular: Negative.   Gastrointestinal: Negative.   Genitourinary: Negative.   Musculoskeletal: Positive for knee pain.   Skin: Negative.   Neurological: Negative.   Endo/Heme/Allergies: Negative.   Psychiatric/Behavioral: Negative.   All other systems reviewed and are negative.     Allergies   Allergen Reactions    Metronidazole Hives and Swelling

## 2024-08-13 LAB
ACID FAST STN SPEC QL: NORMAL
MYCOBACTERIUM SPEC CULT: NORMAL

## 2024-08-22 ENCOUNTER — HOSPITAL ENCOUNTER (OUTPATIENT)
Dept: PHYSICAL THERAPY | Age: 69
Setting detail: THERAPIES SERIES
Discharge: HOME | End: 2024-08-22
Payer: COMMERCIAL

## 2024-08-22 ENCOUNTER — OFFICE VISIT (OUTPATIENT)
Dept: ORTHOPEDIC SURGERY | Age: 69
End: 2024-08-22

## 2024-08-22 DIAGNOSIS — Z96.652 S/P TOTAL KNEE ARTHROPLASTY, LEFT: ICD-10-CM

## 2024-08-22 DIAGNOSIS — M25.562 ACUTE PAIN OF LEFT KNEE: ICD-10-CM

## 2024-08-22 DIAGNOSIS — M24.662 FIBROSIS OF LEFT KNEE JOINT: Primary | ICD-10-CM

## 2024-08-22 PROCEDURE — 97110 THERAPEUTIC EXERCISES: CPT | Performed by: PHYSICAL THERAPIST

## 2024-08-22 PROCEDURE — 97016 VASOPNEUMATIC DEVICE THERAPY: CPT | Performed by: PHYSICAL THERAPIST

## 2024-08-22 PROCEDURE — 97140 MANUAL THERAPY 1/> REGIONS: CPT | Performed by: PHYSICAL THERAPIST

## 2024-08-22 RX ORDER — LIDOCAINE HYDROCHLORIDE 10 MG/ML
3 INJECTION, SOLUTION INFILTRATION; PERINEURAL ONCE
Status: COMPLETED | OUTPATIENT
Start: 2024-08-22 | End: 2024-08-22

## 2024-08-22 RX ORDER — CELECOXIB 100 MG/1
100 CAPSULE ORAL 2 TIMES DAILY
Qty: 60 CAPSULE | Refills: 3 | Status: SHIPPED | OUTPATIENT
Start: 2024-08-22

## 2024-08-22 RX ADMIN — LIDOCAINE HYDROCHLORIDE 3 ML: 10 INJECTION, SOLUTION INFILTRATION; PERINEURAL at 11:09

## 2024-08-22 NOTE — PLAN OF CARE
[facility]- Outpatient Rehabilitation and Therapy [address], [address], [place] [address] office: [phone] fax: [phone]    Physical Therapy Re-Certification Plan of Care    Dear Brian Sutherland MD  ,    We had the pleasure of treating the following patient for physical therapy services at [facility]. A summary of our findings can be found in the updated assessment below.  This includes our plan of care.  If you have any questions or concerns regarding these findings, please do not hesitate to contact me at the office phone number checked above.  Thank you for the referral.     Physician Signature:________________________________Date:__________________  By signing above (or electronic signature), therapist's plan is approved by physician        Overall Response to Treatment:  Patient is responding well to treatment and improvement is noted with regards to goals    Total Visits: 16     Recommendation:    [x] Continue PT 3x / wk for 6 weeks.   [] Hold PT, pending MD visit   [] Discharge to [facility]. Follow up with PT or MD PRN.     Physical Therapy: TREATMENT/PROGRESS NOTE   Patient: Galindo Dickson (68 y.o. male)   Examination Date: 2024   :  1955 MRN: [id number]   Visit #: 16   Insurance Allowable Auth Needed   [place] []Yes    [x]No    Insurance: Payor: MEDICARE / Plan: MEDICARE PART A AND B / Product Type: *No Product type* /   Insurance ID: [id number] - (Medicare)  Secondary Insurance (if applicable): [facility]   Treatment Diagnosis:     ICD-10-CM    1. Left knee pain, unspecified chronicity  M25.562       2. Weakness  R53.1       3. Gait abnormality  R26.9          Medical Diagnosis:  Left knee pain [M25.562]  S/P L TKR & MCL repair 24  S/P RICO + qpump 24  S/P MIRELA + RICO 24  S/P RICO 24   Referring Physician: Brian Sutherland MD  PCP: Tamiko Conteh MD       Plan of care signed (Y/N): Y    Date of Patient follow up with

## 2024-08-26 ENCOUNTER — APPOINTMENT (OUTPATIENT)
Dept: PHYSICAL THERAPY | Age: 69
End: 2024-08-26
Payer: COMMERCIAL

## 2024-08-27 ENCOUNTER — HOSPITAL ENCOUNTER (OUTPATIENT)
Dept: PHYSICAL THERAPY | Age: 69
Setting detail: THERAPIES SERIES
Discharge: HOME | End: 2024-08-27
Payer: COMMERCIAL

## 2024-08-27 ENCOUNTER — TELEPHONE (OUTPATIENT)
Dept: ORTHOPEDIC SURGERY | Age: 69
End: 2024-08-27

## 2024-08-27 PROCEDURE — 97140 MANUAL THERAPY 1/> REGIONS: CPT | Performed by: PHYSICAL THERAPIST

## 2024-08-27 PROCEDURE — 97016 VASOPNEUMATIC DEVICE THERAPY: CPT | Performed by: PHYSICAL THERAPIST

## 2024-08-27 PROCEDURE — 97110 THERAPEUTIC EXERCISES: CPT | Performed by: PHYSICAL THERAPIST

## 2024-08-27 NOTE — FLOWSHEET NOTE
endurance, ROM performed to prevent loss of range of motion, maintain or improve muscular strength or increase flexibility, following either an injury or surgery.   (70665) HOME EXERCISE PROGRAM - Reviewed/Progressed HEP activities related to strengthening, flexibility, endurance, ROM performed to prevent loss of range of motion, maintain or improve muscular strength or increase flexibility, following either an injury or surgery.  (26041) MANUAL THERAPY -  Manual therapy techniques, 1 or more regions, each 15 minutes (Mobilization/manipulation, manual lymphatic drainage, manual traction) for the purpose of modulating pain, promoting relaxation,  increasing ROM, reducing/eliminating soft tissue swelling/inflammation/restriction, improving soft tissue extensibility and allowing for proper ROM for normal function with self care, mobility, lifting and ambulation  (04440) VASOPNEUMATIC      GOALS     GOALS: 8/22/24  Patient stated goal: Return to PLOF without restriction.  [x] Progressing: [] Met: [x] Not Met: [] Adjusted    Therapist goals for Patient:   Short Term Goals: To be achieved in: 1-2 visit(s)  1. Independent in HEP and progression per patient tolerance, in order to prevent re-injury.   [x] Progressing: [] Met: [x] Not Met: [] Adjusted      Long Term Goals:   Long Term Goals: To be achieved in: 8 weeks  Disability index score of 50% or less for the WOMAC to assist with return top prior level of function.    Status: [x] Progressing: [] Met: [x] Not Met: [] Adjusted  Improve  knee PROM  Flexion to 115 degrees or  better to allow for proper joint functioning as indicated by patients functional deficits.  Status: [x] Progressing: [] Met: [x] Not Met: [] Adjusted  Pt to improve strength to show motor control/activation of quadriceps to facilitate functional mobility and ADLs.   Status: [x] Progressing: [] Met: [x] Not Met: [] Adjusted  Patient will return to walking around house without increased symptoms or  restriction to work towards return to prior level of function.  Status: [x] Progressing: [] Met: [x] Not Met: [] Adjusted  Patient will increase LE function to allow independence in all self-care activities.             Status: [x] Progressing: [] Met: [x] Not Met: [] Adjusted      TREATMENT PLAN     Frequency/Duration: 2-3x/week for 8 weeks for the following treatment interventions:    Interventions:  Therapeutic Exercise (95136) including: strength training, ROM, and functional mobility  Therapeutic Activities (39690) including: functional mobility training and education.  Neuromuscular Re-education (66860) activation and proprioception, including postural re-education.    Gait Training (44125) for normalization of ambulation patterns and AD training.   Manual Therapy (79585) as indicated to include: Passive Range of Motion, Gr I-IV mobilizations, and Soft Tissue Mobilization  Modalities as needed that may include: Cryotherapy, Electrical Stimulation, and Vasoneumatic Compression    Plan: Cont POC- Continue emphasis/focus on exercise progression, improving proper muscle recruitment and activation/motor control patterns, modulating pain, increasing ROM, and reduce/eliminate soft tissue swelling/inflammation/restriction. Next visit plan to progress reps and add new exercises      Return in 3-4 weeks for re-assessment    Electronically Signed by Blayne Copeland PT Date: 08/27/2024     Note: Portions of this note have been templated and/or copied from initial evaluation, reassessments and prior notes for documentation efficiency.

## 2024-08-27 NOTE — TELEPHONE ENCOUNTER
Appointment Request     Patient requesting earlier appointment: Yes  Appointment offered to patient: N/A  Patient Contact Number: [phone]        PATIENT CALLED WANTING TO SCHEDULE A APPT WITH RAYSA STEARNS FOR A PRP INJ IN HIS LT KNEE    PLEASE CALL PATIENT BACK AT THE ABOVE NUMBER

## 2024-08-28 ENCOUNTER — APPOINTMENT (OUTPATIENT)
Dept: PHYSICAL THERAPY | Age: 69
End: 2024-08-28
Payer: COMMERCIAL

## 2024-08-29 ENCOUNTER — APPOINTMENT (OUTPATIENT)
Dept: PHYSICAL THERAPY | Age: 69
End: 2024-08-29
Payer: COMMERCIAL

## 2024-09-04 ENCOUNTER — TELEPHONE (OUTPATIENT)
Dept: ORTHOPEDIC SURGERY | Age: 69
End: 2024-09-04

## 2024-09-04 NOTE — TELEPHONE ENCOUNTER
OTHER    Subject: SCHEDULE PRP INJ WITH RAYSA  Patient and /or Facility Request: Galindo Dickson \"[place]\"   Contact Number: [phone]       PATIENT SENT IN MESSAGE VIA ONLINE SCHEDULING, REQ AN APPOINTMENT FOR PRP INJECTION.    PLEASE RETURN CALL TO THE ABOVE NUMBER

## 2024-09-06 NOTE — PROGRESS NOTES
After informed consent was provided phlebotomy was performed to obtain approximately 60 mL of blood.  The blood was spun down in process yielding 8 mL of PRP.  The patient was seated on the exam table with the left knee flexed to 90 degrees. The anterolateral aspect of the knee adjacent to the joint line was double prepped with Chlora-prep. The skin and subcutaneous tissues were anesthetized with ethyl chloride spray. A 20-gauge needle was inserted into the right knee and 2 ml of 1% lidocaine was injected.  The needle was left in place and the syringe removed.  Attached was a 10 mL syringe filled with 8 mL of PRP.  The PRP was injected without resistance. The needle was withdrawn and the puncture site sealed with a Band-Aid. The patient tolerated the procedure well. Post injection instructions and precautions given and any problems to notify us.        Encounter Diagnoses   Name Primary?    Fibrosis of left knee joint Yes    S/P total knee arthroplasty, left     Acute pain of left knee          Shai Nuñez PA-C    Physician Assistant - Certified  Orthopedic Surgery   [facility]    09/06/24 10:40 AM

## 2024-09-24 ENCOUNTER — APPOINTMENT (OUTPATIENT)
Dept: PHYSICAL THERAPY | Age: 69
End: 2024-09-24
Payer: MEDICARE

## 2024-09-24 ENCOUNTER — HOSPITAL ENCOUNTER (OUTPATIENT)
Dept: PHYSICAL THERAPY | Age: 69
Setting detail: THERAPIES SERIES
Discharge: HOME | End: 2024-09-24
Payer: MEDICARE

## 2024-09-24 ENCOUNTER — OFFICE VISIT (OUTPATIENT)
Dept: ORTHOPEDIC SURGERY | Age: 69
End: 2024-09-24

## 2024-09-24 DIAGNOSIS — Z96.652 S/P TOTAL KNEE ARTHROPLASTY, LEFT: ICD-10-CM

## 2024-09-24 DIAGNOSIS — M24.662 FIBROSIS OF LEFT KNEE JOINT: Primary | ICD-10-CM

## 2024-09-24 PROCEDURE — 97016 VASOPNEUMATIC DEVICE THERAPY: CPT | Performed by: PHYSICAL THERAPIST

## 2024-09-24 PROCEDURE — 97110 THERAPEUTIC EXERCISES: CPT | Performed by: PHYSICAL THERAPIST

## 2024-09-24 PROCEDURE — 97140 MANUAL THERAPY 1/> REGIONS: CPT | Performed by: PHYSICAL THERAPIST

## 2024-09-26 ENCOUNTER — HOSPITAL ENCOUNTER (OUTPATIENT)
Dept: PHYSICAL THERAPY | Age: 69
Setting detail: THERAPIES SERIES
Discharge: HOME | End: 2024-09-26
Payer: MEDICARE

## 2024-09-26 PROCEDURE — 97016 VASOPNEUMATIC DEVICE THERAPY: CPT | Performed by: PHYSICAL THERAPIST

## 2024-09-26 PROCEDURE — 97140 MANUAL THERAPY 1/> REGIONS: CPT | Performed by: PHYSICAL THERAPIST

## 2024-09-26 PROCEDURE — 97110 THERAPEUTIC EXERCISES: CPT | Performed by: PHYSICAL THERAPIST

## 2024-10-29 RX ORDER — TAMSULOSIN HYDROCHLORIDE 0.4 MG/1
0.4 CAPSULE ORAL DAILY
Qty: 30 CAPSULE | Refills: 5 | OUTPATIENT
Start: 2024-10-29

## 2024-10-31 RX ORDER — TAMSULOSIN HYDROCHLORIDE 0.4 MG/1
0.4 CAPSULE ORAL DAILY
Qty: 30 CAPSULE | Refills: 2 | Status: SHIPPED | OUTPATIENT
Start: 2024-10-31

## 2024-11-06 DIAGNOSIS — Z00.00 ANNUAL PHYSICAL EXAM: ICD-10-CM

## 2024-11-06 RX ORDER — PRAVASTATIN SODIUM 40 MG
40 TABLET ORAL DAILY
Qty: 90 TABLET | Refills: 0 | Status: SHIPPED | OUTPATIENT
Start: 2024-11-06

## 2024-11-06 NOTE — TELEPHONE ENCOUNTER
Medication:   Requested Prescriptions     Pending Prescriptions Disp Refills    pravastatin (PRAVACHOL) 40 MG tablet 90 tablet 0     Sig: Take 1 tablet by mouth daily     Last Filled:  5/24/2024    Last appt: 4/11/2024   Next appt: Visit date not found    Last Lipid:   Lab Results   Component Value Date/Time    CHOL 231 10/20/2022 10:14 AM    TRIG 104 10/20/2022 10:14 AM    HDL 79 10/20/2022 10:14 AM    HDL 56 08/11/2010 10:18 AM

## 2024-11-11 DIAGNOSIS — Z00.00 ANNUAL PHYSICAL EXAM: ICD-10-CM

## 2024-11-11 RX ORDER — PRAVASTATIN SODIUM 40 MG
40 TABLET ORAL DAILY
Qty: 90 TABLET | Refills: 0 | Status: SHIPPED | OUTPATIENT
Start: 2024-11-11

## 2024-11-11 NOTE — TELEPHONE ENCOUNTER
Medication:   Requested Prescriptions     Pending Prescriptions Disp Refills    pravastatin (PRAVACHOL) 40 MG tablet 90 tablet 0     Sig: Take 1 tablet by mouth daily     Last Filled:  11.6.24  went to wrong pharmacy    Last appt: 4/11/2024   Next appt: Visit date not found    Last Lipid:   Lab Results   Component Value Date/Time    CHOL 231 10/20/2022 10:14 AM    TRIG 104 10/20/2022 10:14 AM    HDL 79 10/20/2022 10:14 AM    HDL 56 08/11/2010 10:18 AM

## 2025-02-12 ENCOUNTER — OFFICE VISIT (OUTPATIENT)
Dept: ORTHOPEDIC SURGERY | Age: 70
End: 2025-02-12
Payer: COMMERCIAL

## 2025-02-12 ENCOUNTER — HOSPITAL ENCOUNTER (OUTPATIENT)
Dept: LAB | Age: 70
Discharge: HOME | End: 2025-02-12
Payer: COMMERCIAL

## 2025-02-12 ENCOUNTER — OFFICE VISIT (OUTPATIENT)
Dept: ONCOLOGY | Age: 70
End: 2025-02-12
Payer: COMMERCIAL

## 2025-02-12 VITALS
HEIGHT: 69 IN | OXYGEN SATURATION: 96 % | TEMPERATURE: 98.1 F | RESPIRATION RATE: 16 BRPM | DIASTOLIC BLOOD PRESSURE: 77 MMHG | SYSTOLIC BLOOD PRESSURE: 114 MMHG | BODY MASS INDEX: 26.81 KG/M2 | WEIGHT: 181 LBS | HEART RATE: 62 BPM

## 2025-02-12 DIAGNOSIS — Z09 SURGERY FOLLOW-UP: ICD-10-CM

## 2025-02-12 DIAGNOSIS — C61 PRIMARY MALIGNANT NEOPLASM OF PROSTATE (HCC): ICD-10-CM

## 2025-02-12 DIAGNOSIS — Z96.652 TOTAL KNEE REPLACEMENT STATUS, LEFT: ICD-10-CM

## 2025-02-12 DIAGNOSIS — M17.11 PRIMARY OSTEOARTHRITIS OF RIGHT KNEE: ICD-10-CM

## 2025-02-12 DIAGNOSIS — M25.561 RIGHT KNEE PAIN, UNSPECIFIED CHRONICITY: Primary | ICD-10-CM

## 2025-02-12 DIAGNOSIS — M17.11 PRIMARY OSTEOARTHRITIS OF RIGHT KNEE: Primary | ICD-10-CM

## 2025-02-12 DIAGNOSIS — C61 PRIMARY MALIGNANT NEOPLASM OF PROSTATE (HCC): Primary | ICD-10-CM

## 2025-02-12 DIAGNOSIS — D37.3 LOW GRADE MUCINOUS NEOPLASM OF APPENDIX: ICD-10-CM

## 2025-02-12 LAB
ALBUMIN SERPL-MCNC: 3.9 G/DL (ref 3.2–4.6)
ALBUMIN/GLOB SERPL: 1.1 (ref 1–1.9)
ALP SERPL-CCNC: 87 U/L (ref 40–129)
ALT SERPL-CCNC: 26 U/L (ref 8–55)
ANION GAP SERPL CALC-SCNC: 11 MMOL/L (ref 7–16)
AST SERPL-CCNC: 22 U/L (ref 15–37)
BASOPHILS # BLD: 0.02 K/UL (ref 0–0.2)
BASOPHILS NFR BLD: 0.3 % (ref 0–2)
BILIRUB SERPL-MCNC: 0.4 MG/DL (ref 0–1.2)
BUN SERPL-MCNC: 17 MG/DL (ref 8–23)
CALCIUM SERPL-MCNC: 9.5 MG/DL (ref 8.8–10.2)
CHLORIDE SERPL-SCNC: 104 MMOL/L (ref 98–107)
CO2 SERPL-SCNC: 24 MMOL/L (ref 20–29)
CREAT SERPL-MCNC: 0.82 MG/DL (ref 0.8–1.3)
DIFFERENTIAL METHOD BLD: ABNORMAL
EOSINOPHIL # BLD: 0.39 K/UL (ref 0–0.8)
EOSINOPHIL NFR BLD: 6.3 % (ref 0.5–7.8)
ERYTHROCYTE [DISTWIDTH] IN BLOOD BY AUTOMATED COUNT: 13.5 % (ref 11.9–14.6)
GLOBULIN SER CALC-MCNC: 3.6 G/DL (ref 2.3–3.5)
GLUCOSE SERPL-MCNC: 107 MG/DL (ref 70–99)
HCT VFR BLD AUTO: 38.5 % (ref 41.1–50.3)
HGB BLD-MCNC: 12.9 G/DL (ref 13.6–17.2)
IMM GRANULOCYTES # BLD AUTO: 0.01 K/UL (ref 0–0.5)
IMM GRANULOCYTES NFR BLD AUTO: 0.2 % (ref 0–5)
LYMPHOCYTES # BLD: 1.05 K/UL (ref 0.5–4.6)
LYMPHOCYTES NFR BLD: 16.9 % (ref 13–44)
MCH RBC QN AUTO: 30.1 PG (ref 26.1–32.9)
MCHC RBC AUTO-ENTMCNC: 33.5 G/DL (ref 31.4–35)
MCV RBC AUTO: 90 FL (ref 82–102)
MONOCYTES # BLD: 0.6 K/UL (ref 0.1–1.3)
MONOCYTES NFR BLD: 9.6 % (ref 4–12)
NEUTS SEG # BLD: 4.16 K/UL (ref 1.7–8.2)
NEUTS SEG NFR BLD: 66.7 % (ref 43–78)
NRBC # BLD: 0 K/UL (ref 0–0.2)
PLATELET # BLD AUTO: 242 K/UL (ref 150–450)
PMV BLD AUTO: 9.8 FL (ref 9.4–12.3)
POTASSIUM SERPL-SCNC: 4.6 MMOL/L (ref 3.5–5.1)
PROT SERPL-MCNC: 7.5 G/DL (ref 6.3–8.2)
PSA SERPL-MCNC: <0.1 NG/ML (ref 0–4)
RBC # BLD AUTO: 4.28 M/UL (ref 4.23–5.6)
SODIUM SERPL-SCNC: 139 MMOL/L (ref 136–145)
WBC # BLD AUTO: 6.2 K/UL (ref 4.3–11.1)

## 2025-02-12 PROCEDURE — 36415 COLL VENOUS BLD VENIPUNCTURE: CPT

## 2025-02-12 PROCEDURE — 1036F TOBACCO NON-USER: CPT | Performed by: ORTHOPAEDIC SURGERY

## 2025-02-12 PROCEDURE — G8417 CALC BMI ABV UP PARAM F/U: HCPCS | Performed by: ORTHOPAEDIC SURGERY

## 2025-02-12 PROCEDURE — 80053 COMPREHEN METABOLIC PANEL: CPT

## 2025-02-12 PROCEDURE — 1036F TOBACCO NON-USER: CPT | Performed by: INTERNAL MEDICINE

## 2025-02-12 PROCEDURE — 1123F ACP DISCUSS/DSCN MKR DOCD: CPT | Performed by: ORTHOPAEDIC SURGERY

## 2025-02-12 PROCEDURE — 99214 OFFICE O/P EST MOD 30 MIN: CPT | Performed by: INTERNAL MEDICINE

## 2025-02-12 PROCEDURE — 99204 OFFICE O/P NEW MOD 45 MIN: CPT | Performed by: ORTHOPAEDIC SURGERY

## 2025-02-12 PROCEDURE — 85025 COMPLETE CBC W/AUTO DIFF WBC: CPT

## 2025-02-12 PROCEDURE — 3017F COLORECTAL CA SCREEN DOC REV: CPT | Performed by: ORTHOPAEDIC SURGERY

## 2025-02-12 PROCEDURE — G8428 CUR MEDS NOT DOCUMENT: HCPCS | Performed by: INTERNAL MEDICINE

## 2025-02-12 PROCEDURE — 84153 ASSAY OF PSA TOTAL: CPT

## 2025-02-12 PROCEDURE — 1123F ACP DISCUSS/DSCN MKR DOCD: CPT | Performed by: INTERNAL MEDICINE

## 2025-02-12 PROCEDURE — G8428 CUR MEDS NOT DOCUMENT: HCPCS | Performed by: ORTHOPAEDIC SURGERY

## 2025-02-12 PROCEDURE — 3017F COLORECTAL CA SCREEN DOC REV: CPT | Performed by: INTERNAL MEDICINE

## 2025-02-12 PROCEDURE — G8417 CALC BMI ABV UP PARAM F/U: HCPCS | Performed by: INTERNAL MEDICINE

## 2025-02-12 RX ORDER — DICLOFENAC SODIUM 75 MG/1
75 TABLET, DELAYED RELEASE ORAL 2 TIMES DAILY
Qty: 60 TABLET | Refills: 0 | Status: SHIPPED | OUTPATIENT
Start: 2025-02-12

## 2025-02-12 ASSESSMENT — PATIENT HEALTH QUESTIONNAIRE - PHQ9
SUM OF ALL RESPONSES TO PHQ QUESTIONS 1-9: 0
SUM OF ALL RESPONSES TO PHQ9 QUESTIONS 1 & 2: 0
2. FEELING DOWN, DEPRESSED OR HOPELESS: NOT AT ALL
SUM OF ALL RESPONSES TO PHQ QUESTIONS 1-9: 0
1. LITTLE INTEREST OR PLEASURE IN DOING THINGS: NOT AT ALL
SUM OF ALL RESPONSES TO PHQ QUESTIONS 1-9: 0
SUM OF ALL RESPONSES TO PHQ QUESTIONS 1-9: 0

## 2025-02-12 NOTE — PROGRESS NOTES
pertinents    Tobacco:  reports that he quit smoking about 2 years ago. His smoking use included cigarettes. He has a 7.5 pack-year smoking history. He has never used smokeless tobacco.  Past Medical History:   Diagnosis Date    Cancer (HCC) August 2022    Appendix    Cancer (HCC) 02/07/2022, 08/16/22    Prostate, Appendix    Cancer of appendix (HCC)     2022    Cataract 2022    COPD     mild- no inhalers    Elevated liver function tests     Giardiasis     Hx of smoking     Hx of smoking     Hyperlipidemia     Kidney stone     Left knee pain     Marijuana smoker     Prostate cancer (HCC) March 2023      Past Surgical History:   Procedure Laterality Date    ANAL FISTULOTOMY Bilateral     APPENDECTOMY  August 2022    CARDIOVASCULAR STRESS TEST  02/01/2008    negative    CARDIOVASCULAR STRESS TEST  01/01/2010    NANCY    COLONOSCOPY  07/01/2004    Dr. Lechuga    COLONOSCOPY  04/01/2008    Dr. Lechuga    COLONOSCOPY  01/01/2013    EYE SURGERY  2022    Cataract    KNEE ARTHROSCOPY Left 6/26/2024    LEFT KNEE ARTHROSCOPY WITH SYNOVECTOMY performed by Brian Sutherland MD at [facility] OR    OTHER SURGICAL HISTORY Left 04/20/2015    LEFT KNEE OPEN PERONEAL NERVE DECOMPRESSION    SKIN BIOPSY  01/01/2011    telangiectasia, benign    TOTAL KNEE ARTHROPLASTY Left 05/08/2024    LEFT TOTAL KNEE  ARTHROPLASTY performed by Brian Sutherland MD at [facility] OR        Physical Examination:  Physical exam: On examination of the patient's gait there is  no obvious angular malalignment.  However, he does tend to have his knee a bit flexed in stance.    On examination while standing there is decreased flexion in the lumbosacral spine without acute list or spasm.    While seated ,  the Bilateral hip is examined there is full range of motion without obvious issue ..     On examination of the  Left knee :ROM is 0 to 120 There is a well-healed midline incision with appropriate range of motion and balance and there is a bit of bulkiness to his knee but nothing

## 2025-02-12 NOTE — TELEPHONE ENCOUNTER
Medication:   Requested Prescriptions     Pending Prescriptions Disp Refills    tamsulosin (FLOMAX) 0.4 MG capsule 30 capsule 2     Sig: Take 1 capsule by mouth daily     Last Filled:  10/31/2024    Last appt: 4/11/2024   Next appt: Visit date not found    Last OARRS:       7/22/2024     9:59 AM   RX Monitoring   Acute Pain Prescriptions Not required given exclusionary diagnoses...   Periodic Controlled Substance Monitoring Possible medication side effects, risk of tolerance/dependence & alternative treatments discussed.;No signs of potential drug abuse or diversion identified.

## 2025-02-12 NOTE — PATIENT INSTRUCTIONS
24 hours before your scheduled appointment time.If you have any questions regarding your upcoming schedule please reach out to your care team through [employer] or call [phone].     Please notify your assigned Nurse Navigator of any unplanned hospital admissions or Emergency Room visits within 24 hours of discharge.    -------------------------------------------------------------------------------------------------------------------  Please call our office at [phone] if you have any  of the following symptoms:   Fever of 100.5 or greater  Chills  Shortness of breath  Swelling or pain in one leg    After office hours an answering service is available and will contact a provider for emergencies or if you are experiencing any of the above symptoms.        Macey Rucker [facility].

## 2025-02-12 NOTE — PROGRESS NOTES
[facility]: Office Visit Established Patient    Chief Complaint:    Chief Complaint   Patient presents with    Follow-up         History of Present Illness:  Mr. Dickson is a 69 y.o. male who presents today for follow-up regarding prostate cancer.  He underwent RALP in 2022 with path showing T3a disease with 1 of 13 nodes involved, GS 7, his PSA dropped to undetectable but he was still offered adjuvant therapy due to N1 disease, he completed EBRT with combined ADT, he is due for approximately 6 more months of therapy, but his most recent Eligard injection was denied by insurance and he is now on Orgovyx.  He has tolerated this well, he has about another month's supply on hand.  He has relocated to [place] and seeking to [facility] oncology care.  We will attempt to get Eligard or Lupron approved for him here, he does not mind either the Lupron or the Orgovyx but from a convenience standpoint it would be nice to avoid daily administration.  If this is not approved, we will send a prescription for Orgovyx to speciality pharmacy.  Check PSA at next visit, recent measurements have been undetectable.  No role for imaging at this time, for either PCa or LAMN.        History of Present Illness  The patient is a 69-year-old male who presents for follow-up of prostate cancer, status post definitive radiation therapy and 2 years of ADT, now on observation. He also has a history of low grade appendiceal mucinous neoplasm    He reports a gradual improvement in his energy levels since completing Eligard, attributing this to his recent engagement in physical exercise. He has been experiencing hot flashes intermittently, with the most recent episode occurring a few days prior to the visit. He expresses concern about the potential recurrence of his prostate cancer, as well as his history of appendiceal cancer. He also mentions the onset of left lower quadrant pain the previous night, which persists at the time of

## 2025-02-13 DIAGNOSIS — M17.11 PRIMARY OSTEOARTHRITIS OF RIGHT KNEE: Primary | ICD-10-CM

## 2025-02-13 RX ORDER — TAMSULOSIN HYDROCHLORIDE 0.4 MG/1
0.4 CAPSULE ORAL DAILY
Qty: 30 CAPSULE | Refills: 2 | Status: SHIPPED | OUTPATIENT
Start: 2025-02-13

## 2025-02-14 RX ORDER — DICLOFENAC SODIUM 75 MG/1
75 TABLET, DELAYED RELEASE ORAL 2 TIMES DAILY
Qty: 60 TABLET | Refills: 0 | Status: SHIPPED | OUTPATIENT
Start: 2025-02-14

## 2025-02-24 DIAGNOSIS — Z00.00 ANNUAL PHYSICAL EXAM: ICD-10-CM

## 2025-02-24 RX ORDER — PRAVASTATIN SODIUM 40 MG
40 TABLET ORAL DAILY
Qty: 90 TABLET | Refills: 0 | Status: SHIPPED | OUTPATIENT
Start: 2025-02-24

## 2025-02-24 NOTE — TELEPHONE ENCOUNTER
Medication:   Requested Prescriptions     Pending Prescriptions Disp Refills    pravastatin (PRAVACHOL) 40 MG tablet 90 tablet 0     Sig: Take 1 tablet by mouth daily     Last Filled:  11.11.24    Last appt: 4/11/2024   Next appt: Visit date not found  [place] and his doesn't see new PCP until March.  He is currently here visiting relatives and wanted to see if he could get a 30 day.    Last Lipid:   Lab Results   Component Value Date/Time    CHOL 231 10/20/2022 10:14 AM    TRIG 104 10/20/2022 10:14 AM    HDL 79 10/20/2022 10:14 AM    HDL 56 08/11/2010 10:18 AM

## 2025-03-28 RX ORDER — TAMSULOSIN HYDROCHLORIDE 0.4 MG/1
0.4 CAPSULE ORAL DAILY
Qty: 30 CAPSULE | Refills: 2 | OUTPATIENT
Start: 2025-03-28

## 2025-04-29 ENCOUNTER — PATIENT MESSAGE (OUTPATIENT)
Dept: ORTHOPEDIC SURGERY | Age: 70
End: 2025-04-29

## 2025-05-21 ENCOUNTER — OFFICE VISIT (OUTPATIENT)
Dept: ORTHOPEDIC SURGERY | Age: 70
End: 2025-05-21
Payer: COMMERCIAL

## 2025-05-21 DIAGNOSIS — M17.11 PRIMARY OSTEOARTHRITIS OF RIGHT KNEE: Primary | ICD-10-CM

## 2025-05-21 PROCEDURE — 20610 DRAIN/INJ JOINT/BURSA W/O US: CPT | Performed by: ORTHOPAEDIC SURGERY

## 2025-05-21 RX ORDER — METHYLPREDNISOLONE ACETATE 40 MG/ML
40 INJECTION, SUSPENSION INTRA-ARTICULAR; INTRALESIONAL; INTRAMUSCULAR; SOFT TISSUE ONCE
Status: COMPLETED | OUTPATIENT
Start: 2025-05-21 | End: 2025-05-21

## 2025-05-21 RX ADMIN — METHYLPREDNISOLONE ACETATE 40 MG: 40 INJECTION, SUSPENSION INTRA-ARTICULAR; INTRALESIONAL; INTRAMUSCULAR; SOFT TISSUE at 08:38

## 2025-05-21 NOTE — PROGRESS NOTES
Name: Galindo Dickson  YOB: 1955  Gender: male  MRN: [id number]        Current Outpatient Medications:     pravastatin (PRAVACHOL) 40 MG tablet, Take 1 tablet by mouth daily, Disp: 90 tablet, Rfl: 0    diclofenac (VOLTAREN) 75 MG EC tablet, Take 1 tablet by mouth 2 times daily, Disp: 60 tablet, Rfl: 0    tamsulosin (FLOMAX) 0.4 MG capsule, Take 1 capsule by mouth daily, Disp: 30 capsule, Rfl: 2    diclofenac (VOLTAREN) 75 MG EC tablet, Take 1 tablet by mouth 2 times daily, Disp: 60 tablet, Rfl: 0    aspirin 81 MG EC tablet, Take 1 tablet by mouth in the morning and at bedtime (Patient taking differently: Take 1 tablet by mouth daily), Disp: 60 tablet, Rfl: 0    NONFORMULARY, SMOKES MARIJUANA, Disp: , Rfl:   Allergies   Allergen Reactions    Metronidazole Hives and Swelling    Naproxen Swelling    Atorvastatin      Other reaction(s): Other (See Comments)   Elevated liver function tests    Codeine Nausea Only    Flagyl [Metronidazole] Hives    Lipitor Other (See Comments)     Elevated liver function tests       CC:right knee pain    Impression: Osteoarthritis of the right knee    Procedure: Depomedrol injection     Procedure Note: The right knee was prepped with alcohol. Then the right knee was injected with 1 mL of 0.5% Marcaine and 40mg of depomedrol The patient tolerated the procedure without difficulty.      ANILA HOWARD MD

## 2025-07-18 ENCOUNTER — TELEPHONE (OUTPATIENT)
Dept: ONCOLOGY | Age: 70
End: 2025-07-18

## 2025-07-24 ENCOUNTER — TELEPHONE (OUTPATIENT)
Dept: ONCOLOGY | Age: 70
End: 2025-07-24

## 2025-08-15 ENCOUNTER — OFFICE VISIT (OUTPATIENT)
Dept: ONCOLOGY | Age: 70
End: 2025-08-15
Payer: COMMERCIAL

## 2025-08-15 ENCOUNTER — HOSPITAL ENCOUNTER (OUTPATIENT)
Dept: LAB | Age: 70
Discharge: HOME | End: 2025-08-15
Payer: COMMERCIAL

## 2025-08-15 VITALS
WEIGHT: 174 LBS | BODY MASS INDEX: 25.77 KG/M2 | RESPIRATION RATE: 22 BRPM | HEART RATE: 63 BPM | TEMPERATURE: 97.7 F | DIASTOLIC BLOOD PRESSURE: 78 MMHG | SYSTOLIC BLOOD PRESSURE: 119 MMHG | HEIGHT: 69 IN | OXYGEN SATURATION: 97 %

## 2025-08-15 DIAGNOSIS — D37.3 LOW GRADE MUCINOUS NEOPLASM OF APPENDIX: ICD-10-CM

## 2025-08-15 DIAGNOSIS — C61 PRIMARY MALIGNANT NEOPLASM OF PROSTATE (HCC): ICD-10-CM

## 2025-08-15 DIAGNOSIS — C61 PRIMARY MALIGNANT NEOPLASM OF PROSTATE (HCC): Primary | ICD-10-CM

## 2025-08-15 LAB
ALBUMIN SERPL-MCNC: 3.6 G/DL (ref 3.2–4.6)
ALBUMIN/GLOB SERPL: 1.1 (ref 1–1.9)
ALP SERPL-CCNC: 85 U/L (ref 40–129)
ALT SERPL-CCNC: 25 U/L (ref 8–55)
ANION GAP SERPL CALC-SCNC: 11 MMOL/L (ref 7–16)
AST SERPL-CCNC: 24 U/L (ref 15–37)
BASOPHILS # BLD: 0.04 K/UL (ref 0–0.2)
BASOPHILS NFR BLD: 0.7 % (ref 0–2)
BILIRUB SERPL-MCNC: 0.4 MG/DL (ref 0–1.2)
BUN SERPL-MCNC: 26 MG/DL (ref 8–23)
CALCIUM SERPL-MCNC: 9.1 MG/DL (ref 8.8–10.2)
CHLORIDE SERPL-SCNC: 106 MMOL/L (ref 98–107)
CO2 SERPL-SCNC: 21 MMOL/L (ref 20–29)
CREAT SERPL-MCNC: 0.94 MG/DL (ref 0.8–1.3)
DIFFERENTIAL METHOD BLD: ABNORMAL
EOSINOPHIL # BLD: 0.62 K/UL (ref 0–0.8)
EOSINOPHIL NFR BLD: 11 % (ref 0.5–7.8)
ERYTHROCYTE [DISTWIDTH] IN BLOOD BY AUTOMATED COUNT: 12.7 % (ref 11.9–14.6)
GLOBULIN SER CALC-MCNC: 3.4 G/DL (ref 2.3–3.5)
GLUCOSE SERPL-MCNC: 96 MG/DL (ref 70–99)
HCT VFR BLD AUTO: 38.3 % (ref 41.1–50.3)
HGB BLD-MCNC: 13 G/DL (ref 13.6–17.2)
IMM GRANULOCYTES # BLD AUTO: 0.01 K/UL (ref 0–0.5)
IMM GRANULOCYTES NFR BLD AUTO: 0.2 % (ref 0–5)
LYMPHOCYTES # BLD: 1.25 K/UL (ref 0.5–4.6)
LYMPHOCYTES NFR BLD: 22.1 % (ref 13–44)
MCH RBC QN AUTO: 31 PG (ref 26.1–32.9)
MCHC RBC AUTO-ENTMCNC: 33.9 G/DL (ref 31.4–35)
MCV RBC AUTO: 91.2 FL (ref 82–102)
MONOCYTES # BLD: 0.55 K/UL (ref 0.1–1.3)
MONOCYTES NFR BLD: 9.7 % (ref 4–12)
NEUTS SEG # BLD: 3.19 K/UL (ref 1.7–8.2)
NEUTS SEG NFR BLD: 56.3 % (ref 43–78)
NRBC # BLD: 0 K/UL (ref 0–0.2)
PLATELET # BLD AUTO: 209 K/UL (ref 150–450)
PMV BLD AUTO: 9.7 FL (ref 9.4–12.3)
POTASSIUM SERPL-SCNC: 4.5 MMOL/L (ref 3.5–5.1)
PROT SERPL-MCNC: 7 G/DL (ref 6.3–8.2)
PSA SERPL-MCNC: <0.1 NG/ML (ref 0–4)
RBC # BLD AUTO: 4.2 M/UL (ref 4.23–5.6)
SODIUM SERPL-SCNC: 138 MMOL/L (ref 136–145)
WBC # BLD AUTO: 5.7 K/UL (ref 4.3–11.1)

## 2025-08-15 PROCEDURE — 85025 COMPLETE CBC W/AUTO DIFF WBC: CPT

## 2025-08-15 PROCEDURE — 1036F TOBACCO NON-USER: CPT | Performed by: INTERNAL MEDICINE

## 2025-08-15 PROCEDURE — G8417 CALC BMI ABV UP PARAM F/U: HCPCS | Performed by: INTERNAL MEDICINE

## 2025-08-15 PROCEDURE — 3017F COLORECTAL CA SCREEN DOC REV: CPT | Performed by: INTERNAL MEDICINE

## 2025-08-15 PROCEDURE — 99214 OFFICE O/P EST MOD 30 MIN: CPT | Performed by: INTERNAL MEDICINE

## 2025-08-15 PROCEDURE — G8428 CUR MEDS NOT DOCUMENT: HCPCS | Performed by: INTERNAL MEDICINE

## 2025-08-15 PROCEDURE — 80053 COMPREHEN METABOLIC PANEL: CPT

## 2025-08-15 PROCEDURE — 84153 ASSAY OF PSA TOTAL: CPT

## 2025-08-15 PROCEDURE — 1123F ACP DISCUSS/DSCN MKR DOCD: CPT | Performed by: INTERNAL MEDICINE

## 2025-08-15 PROCEDURE — 36415 COLL VENOUS BLD VENIPUNCTURE: CPT

## 2025-08-15 RX ORDER — GABAPENTIN 300 MG/1
300 CAPSULE ORAL 2 TIMES DAILY
COMMUNITY

## 2025-08-15 ASSESSMENT — PATIENT HEALTH QUESTIONNAIRE - PHQ9
1. LITTLE INTEREST OR PLEASURE IN DOING THINGS: NOT AT ALL
SUM OF ALL RESPONSES TO PHQ QUESTIONS 1-9: 0
2. FEELING DOWN, DEPRESSED OR HOPELESS: NOT AT ALL

## (undated) DEVICE — TUBING FLD MGMT Y DBL SPIK DUALWAVE

## (undated) DEVICE — GLOVE SURG SZ 7 CRM LTX FREE POLYISOPRENE POLYMER BEAD ANTI

## (undated) DEVICE — 87K ARTHROSCOPY TUBING SET: Brand: 87K

## (undated) DEVICE — DUAL CUT SAGITTAL BLADE

## (undated) DEVICE — NEEDLE HYPO 18GA L1.5IN PNK POLYPR HUB S STL REG BVL STR

## (undated) DEVICE — SYRINGE CATH TIP 50ML

## (undated) DEVICE — SOLUTION IV 1000ML LAC RINGERS PH 6.5 INJ USP VIAFLX PLAS

## (undated) DEVICE — GLOVE SURG SZ 7 L12IN FNGR THK79MIL GRN LTX FREE

## (undated) DEVICE — ELECTRODE PT RET AD L9FT HI MOIST COND ADH HYDRGEL CORDED

## (undated) DEVICE — 3M™ COBAN™ NL STERILE NON-LATEX SELF-ADHERENT WRAP, 2086S, 6 IN X 5 YD (15 CM X 4,5 M), 12 ROLLS/CASE: Brand: 3M™ COBAN™

## (undated) DEVICE — TOTAL KNEE: Brand: MEDLINE INDUSTRIES, INC.

## (undated) DEVICE — PAD,ABDOMINAL,5"X9",ST,LF,25/BX: Brand: MEDLINE INDUSTRIES, INC.

## (undated) DEVICE — NEPTUNE E-SEP SMOKE EVACUATION PENCIL, COATED, 70MM BLADE, PUSH BUTTON SWITCH: Brand: NEPTUNE E-SEP

## (undated) DEVICE — SET IV PMP 1 PRT CK VLV SPL SEPT PRTS 2 PC M LL 20 GTT LEN

## (undated) DEVICE — DRESSING FOAM 4X8IN DISP POSTOP MEPILEX BORD AG

## (undated) DEVICE — SUTURE ETHIBOND EXCEL SZ 0 L18IN NONABSORBABLE GRN L36MM CT-1 CX21D

## (undated) DEVICE — UNDERGLOVE SURG SZ 9 FNGR THK0.21MIL GRN LTX BEAD CUF

## (undated) DEVICE — SOLUTION IV 100ML 0.9% SOD CHL PH5 CONTAIN DEHP VIAFLX QP

## (undated) DEVICE — ABLATOR ARTHROSCOPIC APOLLORF H50

## (undated) DEVICE — BLADE,CARBON-STEEL,10,STRL,DISPOSABLE,TB: Brand: MEDLINE

## (undated) DEVICE — GOWN,SIRUS,POLYRNF,BRTHSLV,XL,30/CS: Brand: MEDLINE

## (undated) DEVICE — TOWEL,STOP FLAG GOLD N-W: Brand: MEDLINE

## (undated) DEVICE — STRIP,CLOSURE,WOUND,MEDI-STRIP,1/2X4: Brand: MEDLINE

## (undated) DEVICE — 3M™ IOBAN™ 2 ANTIMICROBIAL INCISE DRAPE 6648EZ: Brand: IOBAN™ 2

## (undated) DEVICE — SUTURE ETHIBOND EXCEL SZ 0 L18IN NONABSORBABLE GRN L26MM MO-6 CX45D

## (undated) DEVICE — SOLUTION IV 1000ML 0.9% SOD CHL

## (undated) DEVICE — SOLUTION IRRIG 3000ML LAC R FLX CONT

## (undated) DEVICE — NEEDLE, QUINCKE, 18GX3.5": Brand: MEDLINE

## (undated) DEVICE — KNEE ARTHROSCOPY: Brand: MEDLINE INDUSTRIES, INC.

## (undated) DEVICE — SPLINT ORTH 22IN KNEE BASIC

## (undated) DEVICE — 3 BONE CEMENT MIXER: Brand: MIXEVAC

## (undated) DEVICE — UNDERGLOVE SURG SZ 8.5 FNGR THK0.21MIL GRN LTX BEAD CUF

## (undated) DEVICE — TRAP FLUID

## (undated) DEVICE — SYRINGE MED 10ML LUERLOCK TIP W/O SFTY DISP

## (undated) DEVICE — TOWEL,OR,DSP,ST,BLUE,DLX,8/PK,10PK/CS: Brand: MEDLINE

## (undated) DEVICE — SUTURE MONOCRYL + SZ 4-0 L27IN ABSRB UD L19MM PS-2 3/8 CIR MCP426H

## (undated) DEVICE — SUTURE VICRYL + SZ 0 L18IN ABSRB UD L36MM CT-1 1/2 CIR VCP840D

## (undated) DEVICE — SHEET,DRAPE,53X77,STERILE: Brand: MEDLINE

## (undated) DEVICE — OPTIFOAM GENTLE SA, POSTOP, 4X12: Brand: MEDLINE

## (undated) DEVICE — SYRINGE 60ML IRRIG PISTON TOMEY

## (undated) DEVICE — VISIONAIRE ADAPTIVE GUIDE KIT                                    JOURNEY II: Brand: VISIONAIRE

## (undated) DEVICE — GOWN,SIRUS,POLYRNF,BRTHSLV,XLN/XXL,18/CS: Brand: MEDLINE

## (undated) DEVICE — SUTURE MONOCRYL SZ 3-0 L27IN ABSRB UD L26MM SH 1/2 CIR Y416H

## (undated) DEVICE — SUTURE MONOCRYL SZ 2-0 L18IN ABSRB VLT L36MM CT-1 1/2 CIR Y739D

## (undated) DEVICE — 450 ML BOTTLE OF 0.05% CHLORHEXIDINE GLUCONATE IN 99.95% STERILE WATER FOR IRRIGATION, USP AND APPLICATOR.: Brand: IRRISEPT ANTIMICROBIAL WOUND LAVAGE

## (undated) DEVICE — BASIC SINGLE BASIN 1-LF: Brand: MEDLINE INDUSTRIES, INC.

## (undated) DEVICE — FOAM BUMP, LARGE: Brand: MEDLINE INDUSTRIES, INC.

## (undated) DEVICE — MENISCECTOMY ELECTRODE BASIC KIT; STANDARD DESIGN, SHORT: Brand: CONMED

## (undated) DEVICE — SST BUR, WIRE PASS DRILL, 2 FLUTES, MED., 2MM DIA.: Brand: MICROAIRE®

## (undated) DEVICE — APPLICATOR MEDICATED 26 CC SOLUTION HI LT ORNG CHLORAPREP